# Patient Record
Sex: MALE | Race: OTHER | Employment: OTHER | URBAN - METROPOLITAN AREA
[De-identification: names, ages, dates, MRNs, and addresses within clinical notes are randomized per-mention and may not be internally consistent; named-entity substitution may affect disease eponyms.]

---

## 2017-01-09 RX ORDER — ENALAPRIL MALEATE 20 MG/1
TABLET ORAL
Qty: 30 TABLET | Refills: 5 | OUTPATIENT
Start: 2017-01-09

## 2017-01-09 RX ORDER — ATENOLOL 100 MG/1
TABLET ORAL
Qty: 30 TABLET | Refills: 2 | Status: SHIPPED | OUTPATIENT
Start: 2017-01-09 | End: 2017-02-20

## 2017-01-09 RX ORDER — PROPYLTHIOURACIL 50 MG/1
TABLET ORAL
Qty: 180 TABLET | Refills: 5 | OUTPATIENT
Start: 2017-01-09

## 2017-01-09 NOTE — TELEPHONE ENCOUNTER
Per med module, enalapril and propylthiouracil approved on 10/6/16 for 6 months. Refill request too soon.  Therefore duplicate request denied at this time    Hypertensive Medications  Protocol Criteria:  · Appointment scheduled in the past 6 months or in th

## 2017-01-10 RX ORDER — FUROSEMIDE 40 MG/1
TABLET ORAL
Qty: 30 TABLET | Refills: 2 | Status: SHIPPED | OUTPATIENT
Start: 2017-01-10 | End: 2017-10-16 | Stop reason: DRUGHIGH

## 2017-01-31 ENCOUNTER — TELEPHONE (OUTPATIENT)
Dept: FAMILY MEDICINE CLINIC | Facility: CLINIC | Age: 77
End: 2017-01-31

## 2017-01-31 NOTE — TELEPHONE ENCOUNTER
Patients son in law - Jeff Horton called requesting Prior Author - insurance will not cover - Xarelto, will cover the doctor says what reason. Insurance - Medicare ph# [id number]. No ID or refers number given.

## 2017-02-14 RX ORDER — PROPYLTHIOURACIL 50 MG/1
TABLET ORAL
Qty: 180 TABLET | Refills: 0 | Status: SHIPPED | OUTPATIENT
Start: 2017-02-14 | End: 2017-03-18

## 2017-02-14 RX ORDER — ENALAPRIL MALEATE 20 MG/1
TABLET ORAL
Qty: 30 TABLET | Refills: 0 | Status: SHIPPED | OUTPATIENT
Start: 2017-02-14 | End: 2017-03-18

## 2017-02-14 RX ORDER — ATENOLOL 100 MG/1
TABLET ORAL
Qty: 30 TABLET | Refills: 0 | Status: SHIPPED | OUTPATIENT
Start: 2017-02-14 | End: 2017-03-18

## 2017-02-20 ENCOUNTER — LAB ENCOUNTER (OUTPATIENT)
Dept: LAB | Age: 77
End: 2017-02-20
Attending: FAMILY MEDICINE
Payer: MEDICARE

## 2017-02-20 ENCOUNTER — OFFICE VISIT (OUTPATIENT)
Dept: FAMILY MEDICINE CLINIC | Facility: CLINIC | Age: 77
End: 2017-02-20

## 2017-02-20 VITALS
WEIGHT: 216 LBS | DIASTOLIC BLOOD PRESSURE: 65 MMHG | SYSTOLIC BLOOD PRESSURE: 120 MMHG | TEMPERATURE: 98 F | BODY MASS INDEX: 33 KG/M2

## 2017-02-20 DIAGNOSIS — E05.90 HYPERTHYROIDISM: ICD-10-CM

## 2017-02-20 DIAGNOSIS — I48.20 CHRONIC ATRIAL FIBRILLATION (HCC): ICD-10-CM

## 2017-02-20 DIAGNOSIS — I10 ESSENTIAL HYPERTENSION: ICD-10-CM

## 2017-02-20 DIAGNOSIS — I10 ESSENTIAL HYPERTENSION: Primary | ICD-10-CM

## 2017-02-20 LAB
ANION GAP SERPL CALC-SCNC: 6 MMOL/L (ref 0–18)
BASOPHILS # BLD: 0 K/UL (ref 0–0.2)
BASOPHILS NFR BLD: 0 %
BUN SERPL-MCNC: 21 MG/DL (ref 8–20)
BUN/CREAT SERPL: 23.6 (ref 10–20)
CALCIUM SERPL-MCNC: 9.2 MG/DL (ref 8.5–10.5)
CHLORIDE SERPL-SCNC: 107 MMOL/L (ref 95–110)
CHOLEST SERPL-MCNC: 218 MG/DL (ref 110–200)
CO2 SERPL-SCNC: 30 MMOL/L (ref 22–32)
CREAT SERPL-MCNC: 0.89 MG/DL (ref 0.5–1.5)
EOSINOPHIL # BLD: 0.1 K/UL (ref 0–0.7)
EOSINOPHIL NFR BLD: 1 %
ERYTHROCYTE [DISTWIDTH] IN BLOOD BY AUTOMATED COUNT: 14.2 % (ref 11–15)
GLUCOSE SERPL-MCNC: 100 MG/DL (ref 70–99)
HCT VFR BLD AUTO: 44.4 % (ref 41–52)
HDLC SERPL-MCNC: 45 MG/DL
HGB BLD-MCNC: 14.9 G/DL (ref 13.5–17.5)
LDLC SERPL CALC-MCNC: 162 MG/DL (ref 0–99)
LYMPHOCYTES # BLD: 1.6 K/UL (ref 1–4)
LYMPHOCYTES NFR BLD: 31 %
MCH RBC QN AUTO: 29.3 PG (ref 27–32)
MCHC RBC AUTO-ENTMCNC: 33.7 G/DL (ref 32–37)
MCV RBC AUTO: 86.9 FL (ref 80–100)
MONOCYTES # BLD: 0.5 K/UL (ref 0–1)
MONOCYTES NFR BLD: 9 %
NEUTROPHILS # BLD AUTO: 2.9 K/UL (ref 1.8–7.7)
NEUTROPHILS NFR BLD: 58 %
NONHDLC SERPL-MCNC: 173 MG/DL
OSMOLALITY UR CALC.SUM OF ELEC: 299 MOSM/KG (ref 275–295)
PLATELET # BLD AUTO: 117 K/UL (ref 140–400)
PMV BLD AUTO: 9.8 FL (ref 7.4–10.3)
POTASSIUM SERPL-SCNC: 4 MMOL/L (ref 3.3–5.1)
RBC # BLD AUTO: 5.1 M/UL (ref 4.5–5.9)
SODIUM SERPL-SCNC: 143 MMOL/L (ref 136–144)
T4 FREE SERPL-MCNC: 0.76 NG/DL (ref 0.58–1.64)
TRIGL SERPL-MCNC: 53 MG/DL (ref 1–149)
WBC # BLD AUTO: 5.1 K/UL (ref 4–11)

## 2017-02-20 PROCEDURE — 84439 ASSAY OF FREE THYROXINE: CPT

## 2017-02-20 PROCEDURE — 85025 COMPLETE CBC W/AUTO DIFF WBC: CPT

## 2017-02-20 PROCEDURE — 36415 COLL VENOUS BLD VENIPUNCTURE: CPT

## 2017-02-20 PROCEDURE — 99214 OFFICE O/P EST MOD 30 MIN: CPT | Performed by: FAMILY MEDICINE

## 2017-02-20 PROCEDURE — 80061 LIPID PANEL: CPT

## 2017-02-20 PROCEDURE — G0463 HOSPITAL OUTPT CLINIC VISIT: HCPCS | Performed by: FAMILY MEDICINE

## 2017-02-20 PROCEDURE — 80048 BASIC METABOLIC PNL TOTAL CA: CPT

## 2017-02-20 NOTE — PROGRESS NOTES
HPI:    Patient ID: Chinyere Moses is a 67year old male. HPI  Patient presents with:  Hypertension: f/u medication  Thyroid Problem: f/u medication  Here with daughter ,  Taking medication regularly. Is planning cardiology follow up.    Review of System

## 2017-02-21 ENCOUNTER — TELEPHONE (OUTPATIENT)
Dept: CARDIOLOGY CLINIC | Facility: CLINIC | Age: 77
End: 2017-02-21

## 2017-02-21 NOTE — TELEPHONE ENCOUNTER
S/w Tasi and states pt had episode last night of heart rates being low and Bp was low. Bp last night was 110/66 with pulse of 44. Pt was sleeping and felt a left- sided pressure. Today pt is doing well his blood pressure is 120/70 and pulse of 56.  This is

## 2017-02-27 ENCOUNTER — OFFICE VISIT (OUTPATIENT)
Dept: CARDIOLOGY CLINIC | Facility: CLINIC | Age: 77
End: 2017-02-27

## 2017-02-27 VITALS
BODY MASS INDEX: 32.43 KG/M2 | DIASTOLIC BLOOD PRESSURE: 78 MMHG | HEIGHT: 68 IN | WEIGHT: 214 LBS | HEART RATE: 52 BPM | SYSTOLIC BLOOD PRESSURE: 110 MMHG | RESPIRATION RATE: 18 BRPM

## 2017-02-27 DIAGNOSIS — I48.19 PERSISTENT ATRIAL FIBRILLATION (HCC): Primary | ICD-10-CM

## 2017-02-27 PROCEDURE — 93005 ELECTROCARDIOGRAM TRACING: CPT | Performed by: NURSE PRACTITIONER

## 2017-02-27 PROCEDURE — 99214 OFFICE O/P EST MOD 30 MIN: CPT | Performed by: NURSE PRACTITIONER

## 2017-02-27 PROCEDURE — G0463 HOSPITAL OUTPT CLINIC VISIT: HCPCS | Performed by: NURSE PRACTITIONER

## 2017-02-27 PROCEDURE — 93010 ELECTROCARDIOGRAM REPORT: CPT | Performed by: NURSE PRACTITIONER

## 2017-02-27 NOTE — PATIENT INSTRUCTIONS
1. Cut atenolol to 50mg daily at night  2.  Check BP and pulse 2-3 times a week and bring in reading to appt this month

## 2017-02-27 NOTE — PROGRESS NOTES
Anamaria Pepe is a 67year old male. Patient presents with:  Atrial Fibrillation  Edema: lower extremity edema, episodes of chest pressure when patient reports low hr & low bp  Dyspnea: on exertion    HPI:   Patient comes in today for a checkup for his atri upper and lower lids of both eyes 12/22/2015   • Atrial fibrillation (HCC)       Social History:    Smoking Status: Never Smoker                      Alcohol Use: No                 REVIEW OF SYSTEMS:   GENERAL HEALTH: feels well otherwise  SKIN: denies an

## 2017-03-03 ENCOUNTER — TELEPHONE (OUTPATIENT)
Dept: FAMILY MEDICINE CLINIC | Facility: CLINIC | Age: 77
End: 2017-03-03

## 2017-03-03 RX ORDER — ATORVASTATIN CALCIUM 10 MG/1
10 TABLET, FILM COATED ORAL NIGHTLY
Qty: 30 TABLET | Refills: 2 | Status: SHIPPED | OUTPATIENT
Start: 2017-03-03 | End: 2017-06-05

## 2017-03-03 NOTE — TELEPHONE ENCOUNTER
----- Message from Liss Rosado, DO sent at 3/3/2017  1:45 PM CST -----  Stable labs. I do need to add cholesterol lowering medication. See back in 3 months to recheck cholesterol while on med.  Please discuss with pts daughter he does not speak Aric Griffin

## 2017-03-03 NOTE — TELEPHONE ENCOUNTER
Pt gave verbal permission over the phone to speak with son in law Malou Bowling regarding his medical information. Tasi informed of 2/20/17 lab results along with with Dr Perez Exon recommendations. He verbalized understanding and will explain to pt.

## 2017-03-20 RX ORDER — ENALAPRIL MALEATE 20 MG/1
TABLET ORAL
Qty: 30 TABLET | Refills: 5 | Status: SHIPPED | OUTPATIENT
Start: 2017-03-20 | End: 2017-09-19

## 2017-03-20 RX ORDER — ATENOLOL 100 MG/1
TABLET ORAL
Qty: 30 TABLET | Refills: 5 | Status: SHIPPED | OUTPATIENT
Start: 2017-03-20 | End: 2017-09-19

## 2017-03-20 RX ORDER — PROPYLTHIOURACIL 50 MG/1
TABLET ORAL
Qty: 180 TABLET | Refills: 5 | Status: SHIPPED | OUTPATIENT
Start: 2017-03-20 | End: 2017-09-19

## 2017-04-24 RX ORDER — ENALAPRIL MALEATE 20 MG/1
TABLET ORAL
Qty: 30 TABLET | Refills: 0 | OUTPATIENT
Start: 2017-04-24

## 2017-04-24 RX ORDER — ATENOLOL 100 MG/1
TABLET ORAL
Qty: 30 TABLET | Refills: 0 | OUTPATIENT
Start: 2017-04-24

## 2017-04-24 RX ORDER — PROPYLTHIOURACIL 50 MG/1
TABLET ORAL
Qty: 180 TABLET | Refills: 0 | OUTPATIENT
Start: 2017-04-24

## 2017-06-06 RX ORDER — ATORVASTATIN CALCIUM 10 MG/1
TABLET, FILM COATED ORAL
Qty: 30 TABLET | Refills: 0 | Status: SHIPPED | OUTPATIENT
Start: 2017-06-06 | End: 2017-07-04

## 2017-06-06 RX ORDER — FUROSEMIDE 20 MG/1
TABLET ORAL
Qty: 60 TABLET | Refills: 0 | Status: SHIPPED | OUTPATIENT
Start: 2017-06-06 | End: 2017-07-04

## 2017-06-07 ENCOUNTER — MOBILE ENCOUNTER (OUTPATIENT)
Dept: NEPHROLOGY | Facility: CLINIC | Age: 77
End: 2017-06-07

## 2017-07-07 RX ORDER — FUROSEMIDE 20 MG/1
TABLET ORAL
Qty: 60 TABLET | Refills: 1 | Status: SHIPPED | OUTPATIENT
Start: 2017-07-07 | End: 2017-09-03

## 2017-07-07 RX ORDER — ATORVASTATIN CALCIUM 10 MG/1
TABLET, FILM COATED ORAL
Qty: 30 TABLET | Refills: 6 | Status: SHIPPED | OUTPATIENT
Start: 2017-07-07 | End: 2017-10-16

## 2017-09-06 NOTE — TELEPHONE ENCOUNTER
Hypertensive Medications  Protocol Criteria:  · Appointment scheduled in the past 6 months or in the next 3 months  · BMP or CMP in the past 12 months  · Creatinine result < 2  Recent Outpatient Visits            5 months ago     [address]

## 2017-09-06 NOTE — TELEPHONE ENCOUNTER
DR PATIÑO: please review and confirm how patient should be taking his furosemide.  I see two different directions in his med list.

## 2017-09-07 RX ORDER — FUROSEMIDE 20 MG/1
TABLET ORAL
Qty: 60 TABLET | Refills: 5 | Status: SHIPPED | OUTPATIENT
Start: 2017-09-07 | End: 2017-10-16

## 2017-09-22 NOTE — TELEPHONE ENCOUNTER
Hypertensive Medications. PROTOCOL FAILED. PLEASE ADVISE ON REFILL. THANKS.     Protocol Criteria:  · Appointment scheduled in the past 6 months or in the next 3 months  · BMP or CMP in the past 12 months  · Creatinine result < 2  Recent Outpatient Visits months ago Age-related nuclear cataract of left eye    [facility] Mulugeta Reyez MD    Office Visit    12 months ago Chronic atrial fibrillation [facility])    [facility] Cardiology Amy Skinner MD    [facility]

## 2017-09-26 RX ORDER — PROPYLTHIOURACIL 50 MG/1
TABLET ORAL
Qty: 180 TABLET | Refills: 0 | Status: SHIPPED | OUTPATIENT
Start: 2017-09-26 | End: 2017-10-16

## 2017-09-26 RX ORDER — ATENOLOL 100 MG/1
TABLET ORAL
Qty: 30 TABLET | Refills: 0 | Status: SHIPPED | OUTPATIENT
Start: 2017-09-26 | End: 2017-10-16

## 2017-09-26 RX ORDER — ENALAPRIL MALEATE 20 MG/1
TABLET ORAL
Qty: 30 TABLET | Refills: 0 | Status: SHIPPED | OUTPATIENT
Start: 2017-09-26 | End: 2017-10-16

## 2017-09-26 NOTE — TELEPHONE ENCOUNTER
Called home #. Son in law on hipaa. Informed patient will need f/u appt with DR PATIÑO. Phone then disconnected. CSS, if he calls back, ok to give f/u appt for his medication management. One month meds were sent to [facility].

## 2017-10-16 ENCOUNTER — OFFICE VISIT (OUTPATIENT)
Dept: FAMILY MEDICINE CLINIC | Facility: CLINIC | Age: 77
End: 2017-10-16

## 2017-10-16 VITALS
HEART RATE: 60 BPM | DIASTOLIC BLOOD PRESSURE: 67 MMHG | SYSTOLIC BLOOD PRESSURE: 152 MMHG | WEIGHT: 197 LBS | BODY MASS INDEX: 30 KG/M2

## 2017-10-16 DIAGNOSIS — I10 ESSENTIAL HYPERTENSION: Primary | ICD-10-CM

## 2017-10-16 DIAGNOSIS — I48.20 CHRONIC ATRIAL FIBRILLATION (HCC): ICD-10-CM

## 2017-10-16 DIAGNOSIS — E05.90 HYPERTHYROIDISM: ICD-10-CM

## 2017-10-16 PROCEDURE — G0463 HOSPITAL OUTPT CLINIC VISIT: HCPCS | Performed by: FAMILY MEDICINE

## 2017-10-16 PROCEDURE — 99214 OFFICE O/P EST MOD 30 MIN: CPT | Performed by: FAMILY MEDICINE

## 2017-10-16 RX ORDER — ATORVASTATIN CALCIUM 10 MG/1
TABLET, FILM COATED ORAL
Qty: 30 TABLET | Refills: 5 | Status: SHIPPED | OUTPATIENT
Start: 2017-10-16 | End: 2018-07-14

## 2017-10-16 RX ORDER — PROPYLTHIOURACIL 50 MG/1
TABLET ORAL
Qty: 180 TABLET | Refills: 5 | Status: SHIPPED | OUTPATIENT
Start: 2017-10-16 | End: 2018-04-07

## 2017-10-16 RX ORDER — ENALAPRIL MALEATE 20 MG/1
20 TABLET ORAL
Qty: 30 TABLET | Refills: 5 | Status: SHIPPED | OUTPATIENT
Start: 2017-10-16 | End: 2018-04-07

## 2017-10-16 RX ORDER — ATENOLOL 100 MG/1
100 TABLET ORAL
Qty: 30 TABLET | Refills: 5 | Status: SHIPPED | OUTPATIENT
Start: 2017-10-16 | End: 2018-07-14

## 2017-10-16 RX ORDER — FUROSEMIDE 20 MG/1
TABLET ORAL
Qty: 60 TABLET | Refills: 5 | Status: SHIPPED | OUTPATIENT
Start: 2017-10-16 | End: 2018-04-07

## 2017-10-16 NOTE — PROGRESS NOTES
HPI:    Patient ID: Antonio Zimmer is a 65year old male. HPI  Patient presents with:  Hypertension: f/u medication  Thyroid Problem: f/u hypothyroidism    Review of Systems   Constitutional: Negative. Respiratory: Negative.     Cardiovascular: Negativ propylthiouracil 50 MG Oral Tab 180 tablet 5      Sig: TAKE 2 TABLETS BY MOUTH THREE TIMES A DAY      furosemide 20 MG Oral Tab 60 tablet 5      Sig: TAKE TWO TABLETS BY MOUTH DAILY      Enalapril Maleate 20 MG Oral Tab 30 tablet 5      Sig: Take 1 tablet

## 2017-10-19 ENCOUNTER — APPOINTMENT (OUTPATIENT)
Dept: LAB | Age: 77
End: 2017-10-19
Attending: FAMILY MEDICINE
Payer: MEDICARE

## 2017-10-19 DIAGNOSIS — E05.90 HYPERTHYROIDISM: ICD-10-CM

## 2017-10-19 DIAGNOSIS — I10 ESSENTIAL HYPERTENSION: ICD-10-CM

## 2017-10-19 PROCEDURE — 84439 ASSAY OF FREE THYROXINE: CPT

## 2017-10-19 PROCEDURE — 36415 COLL VENOUS BLD VENIPUNCTURE: CPT

## 2017-10-19 PROCEDURE — 80048 BASIC METABOLIC PNL TOTAL CA: CPT

## 2017-10-19 PROCEDURE — 84443 ASSAY THYROID STIM HORMONE: CPT

## 2017-10-24 ENCOUNTER — TELEPHONE (OUTPATIENT)
Dept: OTHER | Age: 77
End: 2017-10-24

## 2017-10-24 DIAGNOSIS — E03.9 HYPOTHYROIDISM, UNSPECIFIED TYPE: Primary | ICD-10-CM

## 2017-10-24 NOTE — TELEPHONE ENCOUNTER
LMTCB transfer to triage    Notes Recorded by Dale Palm DO on 10/23/2017 at 9:11 AM CDT  Continue to take 2 thyroid pills twice per day (Rx is written two tabs three times daily). T4 normal range.  Need thyroid recheck in 6 months.

## 2017-10-25 NOTE — TELEPHONE ENCOUNTER
Pt with son and son informed of Dr Castro Oar orders from 10/23/17 below and repeated information twice. He verbalized understanding. Pt doesn't speak english. Son informed to complete PHI form next time in office.

## 2017-10-30 ENCOUNTER — TELEPHONE (OUTPATIENT)
Dept: CARDIOLOGY CLINIC | Facility: CLINIC | Age: 77
End: 2017-10-30

## 2017-10-30 DIAGNOSIS — I48.20 CHRONIC ATRIAL FIBRILLATION (HCC): ICD-10-CM

## 2017-10-30 NOTE — TELEPHONE ENCOUNTER
Melvinai requesting refills for pt - Xarelto. Pls call. Thank you. Current Outpatient Prescriptions:  Rivaroxaban (XARELTO) 20 MG Oral Tab Take 1 tablet (20 mg total) by mouth daily with food.  (Patient taking differently: Take 10 mg by mouth daily with

## 2017-12-04 ENCOUNTER — OFFICE VISIT (OUTPATIENT)
Dept: CARDIOLOGY CLINIC | Facility: CLINIC | Age: 77
End: 2017-12-04

## 2017-12-04 VITALS
RESPIRATION RATE: 20 BRPM | SYSTOLIC BLOOD PRESSURE: 120 MMHG | BODY MASS INDEX: 30 KG/M2 | HEART RATE: 65 BPM | WEIGHT: 199 LBS | DIASTOLIC BLOOD PRESSURE: 70 MMHG

## 2017-12-04 DIAGNOSIS — I48.20 CHRONIC ATRIAL FIBRILLATION (HCC): Primary | ICD-10-CM

## 2017-12-04 DIAGNOSIS — I70.221 ATHEROSCLEROSIS OF NATIVE ARTERY OF RIGHT LOWER EXTREMITY WITH REST PAIN (HCC): ICD-10-CM

## 2017-12-04 DIAGNOSIS — M79.604 PAIN OF RIGHT LOWER EXTREMITY: ICD-10-CM

## 2017-12-04 DIAGNOSIS — R60.0 LOCALIZED EDEMA: ICD-10-CM

## 2017-12-04 DIAGNOSIS — IMO0002 ULCERATION: ICD-10-CM

## 2017-12-04 PROBLEM — R60.9 EDEMA: Status: ACTIVE | Noted: 2017-12-04

## 2017-12-04 PROBLEM — M79.606 LEG PAIN: Status: ACTIVE | Noted: 2017-12-04

## 2017-12-04 PROCEDURE — G0463 HOSPITAL OUTPT CLINIC VISIT: HCPCS | Performed by: INTERNAL MEDICINE

## 2017-12-04 PROCEDURE — 99214 OFFICE O/P EST MOD 30 MIN: CPT | Performed by: INTERNAL MEDICINE

## 2017-12-04 NOTE — PATIENT INSTRUCTIONS
Please decrease atenolol to 50 mg or one half of the current tablets per day. Please get ultrasounds of the arteries and veins and the legs.

## 2017-12-04 NOTE — PROGRESS NOTES
Deshawn Kekaha is a 65year old male. Patient presents with: Follow - Up: BP and pulse low, Skin peeling on right foot    HPI:   Patient has chronic atrial fibrillation on rate control and anticoagulation.   According to his daughter times his blood pressure heartburn  NEURO: denies headaches    EXAM:   /70   Pulse 65   Resp 20   Wt 199 lb (90.3 kg)   BMI 30.26 kg/m²   GENERAL: well developed, well nourished,in no apparent distress  SKIN: no rashes,no suspicious lesions  HEENT: atraumatic, normocephalic,

## 2017-12-13 ENCOUNTER — HOSPITAL ENCOUNTER (OUTPATIENT)
Dept: ULTRASOUND IMAGING | Facility: HOSPITAL | Age: 77
Discharge: HOME | End: 2017-12-13
Attending: INTERNAL MEDICINE
Payer: MEDICARE

## 2017-12-13 DIAGNOSIS — R60.0 LOCALIZED EDEMA: ICD-10-CM

## 2017-12-13 PROCEDURE — 93970 EXTREMITY STUDY: CPT | Performed by: INTERNAL MEDICINE

## 2017-12-22 ENCOUNTER — HOSPITAL ENCOUNTER (OUTPATIENT)
Dept: ULTRASOUND IMAGING | Facility: HOSPITAL | Age: 77
Discharge: HOME | End: 2017-12-22
Attending: INTERNAL MEDICINE
Payer: MEDICARE

## 2017-12-22 DIAGNOSIS — I70.221 ATHEROSCLEROSIS OF NATIVE ARTERY OF RIGHT LOWER EXTREMITY WITH REST PAIN (HCC): ICD-10-CM

## 2017-12-22 DIAGNOSIS — IMO0002 ULCERATION: ICD-10-CM

## 2017-12-22 PROCEDURE — 93923 UPR/LXTR ART STDY 3+ LVLS: CPT | Performed by: INTERNAL MEDICINE

## 2018-01-02 ENCOUNTER — OFFICE VISIT (OUTPATIENT)
Dept: CARDIOLOGY CLINIC | Facility: CLINIC | Age: 78
End: 2018-01-02

## 2018-01-02 VITALS
BODY MASS INDEX: 31 KG/M2 | RESPIRATION RATE: 16 BRPM | HEART RATE: 60 BPM | WEIGHT: 203 LBS | DIASTOLIC BLOOD PRESSURE: 60 MMHG | SYSTOLIC BLOOD PRESSURE: 132 MMHG

## 2018-01-02 DIAGNOSIS — E78.5 HYPERLIPIDEMIA, UNSPECIFIED HYPERLIPIDEMIA TYPE: Primary | ICD-10-CM

## 2018-01-02 PROCEDURE — 99213 OFFICE O/P EST LOW 20 MIN: CPT | Performed by: NURSE PRACTITIONER

## 2018-01-02 PROCEDURE — G0463 HOSPITAL OUTPT CLINIC VISIT: HCPCS | Performed by: NURSE PRACTITIONER

## 2018-01-02 NOTE — PROGRESS NOTES
Florentino Madrid is a 65year old male. No chief complaint on file. HPI:   Patient comes in today for a checkup for atrial fibrillation.   He sees Dr. Katia Willard he was last seen couple weeks ago at that time there was some concern about his heart rate is getting skin lesions or rashes  RESPIRATORY: denies shortness of breath with exertion  CARDIOVASCULAR: no chest pain  GI: denies abdominal pain and denies heartburn  NEURO: denies headaches    EXAM:   /60   Pulse 60   Resp 16   Wt 203 lb (92.1 kg)   BMI 30.8

## 2018-01-26 ENCOUNTER — TELEPHONE (OUTPATIENT)
Dept: CARDIOLOGY CLINIC | Facility: CLINIC | Age: 78
End: 2018-01-26

## 2018-01-26 DIAGNOSIS — I48.20 CHRONIC ATRIAL FIBRILLATION (HCC): ICD-10-CM

## 2018-01-26 NOTE — TELEPHONE ENCOUNTER
Xarelto 20mg tabs, take 1 tab (20mg) by mouth daily w/ food, qty 30    Current Outpatient Prescriptions:   •  Rivaroxaban (XARELTO) 20 MG Oral Tab, Take 1 tablet (20 mg total) by mouth daily with food. , Disp: 30 tablet, Rfl: 3

## 2018-04-09 RX ORDER — FUROSEMIDE 20 MG/1
TABLET ORAL
Qty: 60 TABLET | Refills: 2 | Status: SHIPPED | OUTPATIENT
Start: 2018-04-09 | End: 2018-07-14

## 2018-04-09 RX ORDER — PROPYLTHIOURACIL 50 MG/1
TABLET ORAL
Qty: 180 TABLET | Refills: 0 | Status: SHIPPED | OUTPATIENT
Start: 2018-04-09 | End: 2018-05-13

## 2018-04-09 RX ORDER — ENALAPRIL MALEATE 20 MG/1
TABLET ORAL
Qty: 30 TABLET | Refills: 2 | Status: SHIPPED | OUTPATIENT
Start: 2018-04-09 | End: 2018-07-14

## 2018-04-09 NOTE — TELEPHONE ENCOUNTER
Hypertensive Medications  Protocol Criteria:  · Appointment scheduled in the past 6 months or in the next 3 months  · BMP or CMP in the past 12 months  · Creatinine result < 2  Recent Outpatient Visits            3 months ago Hyperlipidemia, unspecified hy [facility] Cardiology Melody Grove MD    Office Visit    1 year ago Persistent atrial fibrillation [facility])    [facility] Cardiology CORBIN Campuzano    Office Visit            Lab Results  Component Value Date   TSH <0.01 (L) 10/19/201

## 2018-04-23 ENCOUNTER — OFFICE VISIT (OUTPATIENT)
Dept: FAMILY MEDICINE CLINIC | Facility: CLINIC | Age: 78
End: 2018-04-23

## 2018-04-23 VITALS
HEART RATE: 65 BPM | BODY MASS INDEX: 31 KG/M2 | SYSTOLIC BLOOD PRESSURE: 130 MMHG | DIASTOLIC BLOOD PRESSURE: 71 MMHG | WEIGHT: 207 LBS

## 2018-04-23 DIAGNOSIS — H53.9 VISION CHANGES: ICD-10-CM

## 2018-04-23 DIAGNOSIS — I48.20 CHRONIC ATRIAL FIBRILLATION (HCC): ICD-10-CM

## 2018-04-23 DIAGNOSIS — I73.9 PVD (PERIPHERAL VASCULAR DISEASE) (HCC): ICD-10-CM

## 2018-04-23 DIAGNOSIS — E05.90 HYPERTHYROIDISM: Primary | ICD-10-CM

## 2018-04-23 PROCEDURE — G0463 HOSPITAL OUTPT CLINIC VISIT: HCPCS | Performed by: FAMILY MEDICINE

## 2018-04-23 PROCEDURE — 99214 OFFICE O/P EST MOD 30 MIN: CPT | Performed by: FAMILY MEDICINE

## 2018-04-23 RX ORDER — FLUTICASONE PROPIONATE 0.05 %
CREAM (GRAM) TOPICAL
Qty: 45 G | Refills: 1 | Status: SHIPPED | OUTPATIENT
Start: 2018-04-23

## 2018-04-23 NOTE — PROGRESS NOTES
HPI:    Patient ID: Chelo Cruz is a 65year old male. HPI  Patient presents with:  Hypertension: 6 month f/u medication and refills  Hyperthyroidism  Atrial Fibrillation    Review of Systems   Constitutional: Negative. Respiratory: Negative.     Ca Refills    Fluticasone Propionate 0.05 % External Cream 45 g 1      Sig: Apply one to two times daily as needed for itching           Imaging & Referrals:  OPHTHALMOLOGY - INTERNAL       IS#[id number]

## 2018-04-30 ENCOUNTER — APPOINTMENT (OUTPATIENT)
Dept: LAB | Age: 78
End: 2018-04-30
Attending: FAMILY MEDICINE
Payer: MEDICARE

## 2018-04-30 DIAGNOSIS — E78.5 HYPERLIPIDEMIA, UNSPECIFIED HYPERLIPIDEMIA TYPE: ICD-10-CM

## 2018-04-30 DIAGNOSIS — E05.90 HYPERTHYROIDISM: ICD-10-CM

## 2018-04-30 DIAGNOSIS — E03.9 HYPOTHYROIDISM, UNSPECIFIED TYPE: ICD-10-CM

## 2018-04-30 PROCEDURE — 80061 LIPID PANEL: CPT

## 2018-04-30 PROCEDURE — 84443 ASSAY THYROID STIM HORMONE: CPT

## 2018-04-30 PROCEDURE — 84450 TRANSFERASE (AST) (SGOT): CPT

## 2018-04-30 PROCEDURE — 36415 COLL VENOUS BLD VENIPUNCTURE: CPT

## 2018-04-30 PROCEDURE — 84460 ALANINE AMINO (ALT) (SGPT): CPT

## 2018-04-30 PROCEDURE — 84439 ASSAY OF FREE THYROXINE: CPT

## 2018-05-04 ENCOUNTER — TELEPHONE (OUTPATIENT)
Dept: CARDIOLOGY CLINIC | Facility: CLINIC | Age: 78
End: 2018-05-04

## 2018-05-04 DIAGNOSIS — E78.5 HYPERLIPIDEMIA, UNSPECIFIED HYPERLIPIDEMIA TYPE: Primary | ICD-10-CM

## 2018-05-04 RX ORDER — ATORVASTATIN CALCIUM 20 MG/1
20 TABLET, FILM COATED ORAL NIGHTLY
Qty: 90 TABLET | Refills: 0 | Status: SHIPPED | OUTPATIENT
Start: 2018-05-04 | End: 2018-10-01

## 2018-05-04 NOTE — TELEPHONE ENCOUNTER
----- Message from Mikal Eisenmenger, APN sent at 5/4/2018  4:15 PM CDT -----  Lipids are improved but would like to get LDL <130 increase atrovastatin to 20mg and recheck in 2 months

## 2018-05-14 NOTE — TELEPHONE ENCOUNTER
Pending Prescriptions Disp Refills    PROPYLTHIOURACIL 50 MG Oral Tab [Pharmacy Med Name: Propylthiouracil Oral Tablet 50 MG] 180 tablet 0     Sig: TAKE TWO TABLETS BY MOUTH THREE TIMES DAILY            Hypothyroid Medications  Protocol Criteria:  Appointment scheduled in the past 12 months or the next 3 months  TSH resulted in the past 12 months that is normal  Recent Outpatient Visits            3 weeks ago Hyperthyroidism    [facility], [address] [place] [address]    Office Visit    4 months ago Hyperlipidemia, unspecified hyperlipidemia type    [facility] Cardiology CORBIN Rivera    Office Visit    5 months ago Chronic atrial fibrillation [facility])    [facility] Cardiology Surjit Mendez MD    Office Visit    7 months ago Essential hypertension    [facility] [address] [place] [address]    Office Visit    1 year ago     [facility] Cardiology Surjit Mendez MD    Office Visit            Lab Results  Component Value Date   TSH <0.01 (L) 04/30/2018   THYROIDFUNC 0.09 (L) 03/24/2016

## 2018-05-19 RX ORDER — PROPYLTHIOURACIL 50 MG/1
TABLET ORAL
Qty: 180 TABLET | Refills: 5 | Status: SHIPPED | OUTPATIENT
Start: 2018-05-19 | End: 2018-11-27

## 2018-06-19 ENCOUNTER — TELEPHONE (OUTPATIENT)
Dept: CARDIOLOGY CLINIC | Facility: CLINIC | Age: 78
End: 2018-06-19

## 2018-06-19 DIAGNOSIS — I48.20 CHRONIC ATRIAL FIBRILLATION (HCC): ICD-10-CM

## 2018-06-21 NOTE — TELEPHONE ENCOUNTER
Re: Guillaume Rodas referral.  Meets & filled per protocol. Called patient LMTCB to schedule follow up with Dr. Anitha Coleman (was due April or May per LOV). Please send call back to RN [phone] for pos 7/9 slot. Thanks.       Anticoagulant Medications  Protocol Criteria:  ·

## 2018-07-16 RX ORDER — ATENOLOL 100 MG/1
TABLET ORAL
Qty: 90 TABLET | Refills: 0 | Status: SHIPPED | OUTPATIENT
Start: 2018-07-16 | End: 2020-06-02

## 2018-07-16 RX ORDER — ENALAPRIL MALEATE 20 MG/1
TABLET ORAL
Qty: 90 TABLET | Refills: 0 | Status: SHIPPED | OUTPATIENT
Start: 2018-07-16 | End: 2020-06-02

## 2018-07-16 RX ORDER — FUROSEMIDE 20 MG/1
TABLET ORAL
Qty: 180 TABLET | Refills: 0 | Status: SHIPPED | OUTPATIENT
Start: 2018-07-16 | End: 2018-12-11

## 2018-07-20 RX ORDER — ATORVASTATIN CALCIUM 10 MG/1
TABLET, FILM COATED ORAL
Qty: 90 TABLET | Refills: 1 | Status: SHIPPED | OUTPATIENT
Start: 2018-07-20 | End: 2019-01-17

## 2018-08-17 ENCOUNTER — APPOINTMENT (OUTPATIENT)
Dept: LAB | Age: 78
End: 2018-08-17
Attending: INTERNAL MEDICINE
Payer: MEDICARE

## 2018-08-17 DIAGNOSIS — E78.5 HYPERLIPIDEMIA, UNSPECIFIED HYPERLIPIDEMIA TYPE: ICD-10-CM

## 2018-08-17 LAB
ALT SERPL-CCNC: 21 U/L (ref 17–63)
AST SERPL-CCNC: 20 U/L (ref 15–41)
CHOLEST SERPL-MCNC: 143 MG/DL (ref 110–200)
HDLC SERPL-MCNC: 42 MG/DL
LDLC SERPL CALC-MCNC: 90 MG/DL (ref 0–99)
NONHDLC SERPL-MCNC: 101 MG/DL
TRIGL SERPL-MCNC: 53 MG/DL (ref 1–149)

## 2018-08-17 PROCEDURE — 80061 LIPID PANEL: CPT

## 2018-08-17 PROCEDURE — 84460 ALANINE AMINO (ALT) (SGPT): CPT

## 2018-08-17 PROCEDURE — 84450 TRANSFERASE (AST) (SGOT): CPT

## 2018-08-17 PROCEDURE — 36415 COLL VENOUS BLD VENIPUNCTURE: CPT

## 2018-10-01 ENCOUNTER — OFFICE VISIT (OUTPATIENT)
Dept: CARDIOLOGY CLINIC | Facility: CLINIC | Age: 78
End: 2018-10-01
Payer: MEDICARE

## 2018-10-01 VITALS
DIASTOLIC BLOOD PRESSURE: 61 MMHG | SYSTOLIC BLOOD PRESSURE: 119 MMHG | HEIGHT: 69.5 IN | WEIGHT: 209 LBS | RESPIRATION RATE: 18 BRPM | HEART RATE: 63 BPM | BODY MASS INDEX: 30.26 KG/M2

## 2018-10-01 DIAGNOSIS — I48.20 CHRONIC ATRIAL FIBRILLATION (HCC): Primary | ICD-10-CM

## 2018-10-01 PROCEDURE — G0463 HOSPITAL OUTPT CLINIC VISIT: HCPCS | Performed by: INTERNAL MEDICINE

## 2018-10-01 PROCEDURE — 99214 OFFICE O/P EST MOD 30 MIN: CPT | Performed by: INTERNAL MEDICINE

## 2018-10-01 NOTE — PROGRESS NOTES
Amena Snell is a 77year old male. Patient presents with: Follow - Up  Atrial Fibrillation    HPI:   Patient has chronic atrial fibrillation on rate control anticoagulation.   He is doing well and is without symptom    Current Outpatient Medications:  ZULEIMA appears well-developed and well-nourished. Cardiovascular: Normal rate, normal heart sounds and intact distal pulses. An irregularly irregular rhythm present. Pulmonary/Chest: Effort normal and breath sounds normal.   Abdominal: Soft.  Bowel sounds are

## 2018-11-27 ENCOUNTER — OFFICE VISIT (OUTPATIENT)
Dept: FAMILY MEDICINE CLINIC | Facility: CLINIC | Age: 78
End: 2018-11-27
Payer: MEDICARE

## 2018-11-27 VITALS
TEMPERATURE: 97 F | HEART RATE: 65 BPM | WEIGHT: 208 LBS | SYSTOLIC BLOOD PRESSURE: 154 MMHG | DIASTOLIC BLOOD PRESSURE: 65 MMHG | BODY MASS INDEX: 30 KG/M2

## 2018-11-27 DIAGNOSIS — M79.604 RIGHT LEG PAIN: ICD-10-CM

## 2018-11-27 DIAGNOSIS — E05.90 HYPERTHYROIDISM: Primary | ICD-10-CM

## 2018-11-27 DIAGNOSIS — I73.9 PVD (PERIPHERAL VASCULAR DISEASE) (HCC): ICD-10-CM

## 2018-11-27 PROCEDURE — 99214 OFFICE O/P EST MOD 30 MIN: CPT | Performed by: FAMILY MEDICINE

## 2018-11-27 PROCEDURE — G0463 HOSPITAL OUTPT CLINIC VISIT: HCPCS | Performed by: FAMILY MEDICINE

## 2018-11-27 RX ORDER — PROPYLTHIOURACIL 50 MG/1
TABLET ORAL
Qty: 180 TABLET | Refills: 5 | Status: SHIPPED | OUTPATIENT
Start: 2018-11-27 | End: 2018-12-13

## 2018-11-28 NOTE — PROGRESS NOTES
HPI:    Patient ID: Anamaria Pepe is a 77year old male. HPI  Patient presents with:  Leg Pain: 6 month follow up, LT leg pain, rash on LT leg, PT DECLINED FLU SHOT        Review of Systems   Constitutional: Negative. Respiratory: Negative.     Cardio current medical conditions including mild dementia.   Orders Placed This Encounter      Thyroxine, Free [E]      TSH [E]      CBC With Differential With Platelet      Basic Metabolic Panel (8) [E]      Meds This Visit:  Requested Prescriptions     Signed Pr

## 2018-12-03 ENCOUNTER — APPOINTMENT (OUTPATIENT)
Dept: LAB | Age: 78
End: 2018-12-03
Attending: FAMILY MEDICINE
Payer: MEDICARE

## 2018-12-03 ENCOUNTER — LAB ENCOUNTER (OUTPATIENT)
Dept: LAB | Age: 78
End: 2018-12-03
Attending: FAMILY MEDICINE
Payer: MEDICARE

## 2018-12-03 ENCOUNTER — HOSPITAL ENCOUNTER (OUTPATIENT)
Dept: GENERAL RADIOLOGY | Age: 78
Discharge: HOME | End: 2018-12-03
Attending: FAMILY MEDICINE
Payer: MEDICARE

## 2018-12-03 DIAGNOSIS — M79.604 RIGHT LEG PAIN: ICD-10-CM

## 2018-12-03 DIAGNOSIS — E05.90 HYPERTHYROIDISM: ICD-10-CM

## 2018-12-03 PROCEDURE — 73590 X-RAY EXAM OF LOWER LEG: CPT | Performed by: FAMILY MEDICINE

## 2018-12-03 PROCEDURE — 85025 COMPLETE CBC W/AUTO DIFF WBC: CPT

## 2018-12-03 PROCEDURE — 84443 ASSAY THYROID STIM HORMONE: CPT

## 2018-12-03 PROCEDURE — 36415 COLL VENOUS BLD VENIPUNCTURE: CPT

## 2018-12-03 PROCEDURE — 84439 ASSAY OF FREE THYROXINE: CPT

## 2018-12-03 PROCEDURE — 80048 BASIC METABOLIC PNL TOTAL CA: CPT

## 2018-12-11 RX ORDER — FUROSEMIDE 20 MG/1
TABLET ORAL
Qty: 180 TABLET | Refills: 0 | Status: SHIPPED | OUTPATIENT
Start: 2018-12-11 | End: 2019-03-10

## 2018-12-12 NOTE — TELEPHONE ENCOUNTER
Refill passed per [address] [place] [address] protocol.   Hypertensive Medications  Protocol Criteria:  · Appointment scheduled in the past 6 months or in the next 3 months  · BMP or CMP in the past 12 months  · Creatinine result < 2  Recent Outpatient Visits

## 2018-12-13 ENCOUNTER — TELEPHONE (OUTPATIENT)
Dept: FAMILY MEDICINE CLINIC | Facility: CLINIC | Age: 78
End: 2018-12-13

## 2018-12-13 RX ORDER — PROPYLTHIOURACIL 50 MG/1
TABLET ORAL
Qty: 270 TABLET | Refills: 5 | Status: SHIPPED | OUTPATIENT
Start: 2018-12-13 | End: 2019-04-16

## 2018-12-13 NOTE — TELEPHONE ENCOUNTER
Dr Taty Glover, please advise on 2 issues. 1. Advised patient's son-in-law (on HIPAA) on Dr. Sukhdeep Lopez information and recommendations.  He verbalized understanding, but stated that the pharmacy had not given the patient the correct amount of medication so t

## 2018-12-13 NOTE — TELEPHONE ENCOUNTER
Created letter for patient family due to some legal issue that occurred in public . See letter and inform.

## 2018-12-13 NOTE — TELEPHONE ENCOUNTER
Pt's son in law called in requesting to speak with a nurse in regards to pt's lab results from 12/3. Son in law Bladimir Rasheed is on HIPAA.     Please advise

## 2019-01-15 ENCOUNTER — OFFICE VISIT (OUTPATIENT)
Dept: FAMILY MEDICINE CLINIC | Facility: CLINIC | Age: 79
End: 2019-01-15
Payer: MEDICARE

## 2019-01-15 VITALS
SYSTOLIC BLOOD PRESSURE: 125 MMHG | WEIGHT: 208 LBS | TEMPERATURE: 98 F | DIASTOLIC BLOOD PRESSURE: 74 MMHG | HEART RATE: 73 BPM | HEIGHT: 69.5 IN | BODY MASS INDEX: 30.12 KG/M2

## 2019-01-15 DIAGNOSIS — B02.7 DISSEMINATED HERPES ZOSTER: ICD-10-CM

## 2019-01-15 DIAGNOSIS — R35.0 FREQUENT URINATION: Primary | ICD-10-CM

## 2019-01-15 LAB
APPEARANCE: CLEAR
BILIRUBIN: NEGATIVE
GLUCOSE (URINE DIPSTICK): NEGATIVE MG/DL
KETONES (URINE DIPSTICK): NEGATIVE MG/DL
LEUKOCYTES: NEGATIVE
MULTISTIX LOT#: NORMAL NUMERIC
NITRITE, URINE: NEGATIVE
OCCULT BLOOD: NEGATIVE
PH, URINE: 6 (ref 4.5–8)
PROTEIN (URINE DIPSTICK): NEGATIVE MG/DL
SPECIFIC GRAVITY: 1.01 (ref 1–1.03)
URINE-COLOR: YELLOW
UROBILINOGEN,SEMI-QN: 0.2 MG/DL (ref 0–1.9)

## 2019-01-15 PROCEDURE — 81002 URINALYSIS NONAUTO W/O SCOPE: CPT | Performed by: FAMILY MEDICINE

## 2019-01-15 PROCEDURE — 99213 OFFICE O/P EST LOW 20 MIN: CPT | Performed by: FAMILY MEDICINE

## 2019-01-15 PROCEDURE — G0463 HOSPITAL OUTPT CLINIC VISIT: HCPCS | Performed by: FAMILY MEDICINE

## 2019-01-15 RX ORDER — PREDNISONE 10 MG/1
TABLET ORAL
Qty: 15 TABLET | Refills: 0 | Status: SHIPPED | OUTPATIENT
Start: 2019-01-15 | End: 2019-04-08 | Stop reason: ALTCHOICE

## 2019-01-15 RX ORDER — VALACYCLOVIR HYDROCHLORIDE 1 G/1
1 TABLET, FILM COATED ORAL EVERY 12 HOURS SCHEDULED
Qty: 14 TABLET | Refills: 0 | Status: SHIPPED | OUTPATIENT
Start: 2019-01-15 | End: 2019-01-22

## 2019-01-15 NOTE — PROGRESS NOTES
HPI:    Patient ID: Denver Duane is a 77year old male. HPI  Patient presents with:  Rash: on LT side of abdomen, with itching, has sharp pain, frequent urination denies burning senstation       Review of Systems   Constitutional: Negative.     Skin: Po • ValACYclovir HCl 1 G Oral Tab 14 tablet 0     Sig: Take 1 tablet (1,000 mg total) by mouth every 12 (twelve) hours for 7 days.    • predniSONE 10 MG Oral Tab 15 tablet 0     Sig: Take 1 tab three times daily for 3 days then 1 tab twice daily for 3 days

## 2019-01-17 RX ORDER — ATORVASTATIN CALCIUM 10 MG/1
TABLET, FILM COATED ORAL
Qty: 90 TABLET | Refills: 0 | Status: SHIPPED | OUTPATIENT
Start: 2019-01-17 | End: 2019-04-15

## 2019-03-10 RX ORDER — FUROSEMIDE 20 MG/1
TABLET ORAL
Qty: 180 TABLET | Refills: 0 | Status: SHIPPED | OUTPATIENT
Start: 2019-03-10 | End: 2019-06-05

## 2019-04-08 ENCOUNTER — OFFICE VISIT (OUTPATIENT)
Dept: FAMILY MEDICINE CLINIC | Facility: CLINIC | Age: 79
End: 2019-04-08
Payer: MEDICARE

## 2019-04-08 VITALS
BODY MASS INDEX: 31 KG/M2 | SYSTOLIC BLOOD PRESSURE: 127 MMHG | WEIGHT: 216 LBS | HEART RATE: 74 BPM | DIASTOLIC BLOOD PRESSURE: 76 MMHG

## 2019-04-08 DIAGNOSIS — E05.90 HYPERTHYROIDISM: Primary | ICD-10-CM

## 2019-04-08 DIAGNOSIS — I10 ESSENTIAL HYPERTENSION: ICD-10-CM

## 2019-04-08 PROCEDURE — 99214 OFFICE O/P EST MOD 30 MIN: CPT | Performed by: FAMILY MEDICINE

## 2019-04-08 PROCEDURE — G0463 HOSPITAL OUTPT CLINIC VISIT: HCPCS | Performed by: FAMILY MEDICINE

## 2019-04-08 NOTE — PROGRESS NOTES
HPI:    Patient ID: Yuri Geronimo is a 77year old male. HPI  Patient presents with:  Thyroid Problem: f/u hypothyroidism, will need thyroid level done    Review of Systems   Constitutional: Negative. Respiratory: Negative.     Cardiovascular: Negativ

## 2019-04-11 ENCOUNTER — APPOINTMENT (OUTPATIENT)
Dept: LAB | Age: 79
End: 2019-04-11
Attending: FAMILY MEDICINE
Payer: MEDICARE

## 2019-04-11 DIAGNOSIS — E05.90 HYPERTHYROIDISM: ICD-10-CM

## 2019-04-11 PROCEDURE — 84439 ASSAY OF FREE THYROXINE: CPT

## 2019-04-11 PROCEDURE — 84443 ASSAY THYROID STIM HORMONE: CPT

## 2019-04-11 PROCEDURE — 36415 COLL VENOUS BLD VENIPUNCTURE: CPT

## 2019-04-15 RX ORDER — ATORVASTATIN CALCIUM 10 MG/1
TABLET, FILM COATED ORAL
Qty: 90 TABLET | Refills: 1 | Status: SHIPPED | OUTPATIENT
Start: 2019-04-15 | End: 2019-09-30

## 2019-06-05 RX ORDER — FUROSEMIDE 20 MG/1
TABLET ORAL
Qty: 180 TABLET | Refills: 1 | Status: SHIPPED | OUTPATIENT
Start: 2019-06-05 | End: 2019-11-29

## 2019-06-06 NOTE — TELEPHONE ENCOUNTER
Refill passed per [facility] protocol.     Requested Prescriptions   Pending Prescriptions Disp Refills   • FUROSEMIDE 20 MG Oral Tab [Pharmacy Med Name: Furosemide 20 Mg Tab Solc] 180 tablet 1     Sig: TAKE TWO TABLETS BY MOUTH DAILY       Hypertensiv

## 2019-07-10 ENCOUNTER — TELEPHONE (OUTPATIENT)
Dept: INTERNAL MEDICINE CLINIC | Facility: CLINIC | Age: 79
End: 2019-07-10

## 2019-07-10 DIAGNOSIS — I48.20 CHRONIC ATRIAL FIBRILLATION (HCC): ICD-10-CM

## 2019-07-10 NOTE — TELEPHONE ENCOUNTER
Current Outpatient Medications:   •  Rivaroxaban (XARELTO) 20 MG Oral Tab, Take 1 tablet (20 mg total) by mouth daily with food. , Disp: 90 tablet, Rfl: 1

## 2019-07-10 NOTE — TELEPHONE ENCOUNTER
NEEDS APPOINTMENT    S/w Son-In Law, Taci, and appt was schedule for next available with Dr. Geoff Boyd 08/19/19   Anticoagulant Medications  Protocol Criteria:  · Appointment scheduled with Cardiology in the past 6 months or in the next 3 months  · BMP or CMP i

## 2019-07-23 NOTE — TELEPHONE ENCOUNTER
Hyperthyroid Medications  Protocol Criteria:  Appointment scheduled in the past 6 months or the next 3 months  TSH resulted in the past 6 months  Recent Outpatient Visits            3 months ago Hyperthyroidism    [address], [address] [place], [place]    Office Visit    6 months ago Frequent urination    [address], [address] [facility]    Office Visit    7 months ago Hyperthyroidism    [address], [address] [place], [place]    Office Visit    9 months ago Chronic atrial fibrillation [facility])    [facility] Cardiology Maggie Marmolejo MD    Office Visit    1 year ago Hyperthyroidism    [address], [address] [place], [place]    Office Visit        Future Appointments       Provider Department Appt Notes    In 3 weeks Maggie Marmolejo MD [address] [facility] Cardiology follow up         Lab Results   Component Value Date    TSH <0.005 (L) 04/11/2019    THYROIDFUNC 0.09 (L) 03/24/2016

## 2019-07-26 RX ORDER — PROPYLTHIOURACIL 50 MG/1
TABLET ORAL
Qty: 270 TABLET | Refills: 4 | Status: SHIPPED | OUTPATIENT
Start: 2019-07-26 | End: 2019-08-19

## 2019-08-19 ENCOUNTER — OFFICE VISIT (OUTPATIENT)
Dept: CARDIOLOGY CLINIC | Facility: CLINIC | Age: 79
End: 2019-08-19
Payer: MEDICARE

## 2019-08-19 VITALS
SYSTOLIC BLOOD PRESSURE: 127 MMHG | BODY MASS INDEX: 31 KG/M2 | WEIGHT: 210 LBS | RESPIRATION RATE: 20 BRPM | DIASTOLIC BLOOD PRESSURE: 69 MMHG | HEART RATE: 56 BPM | OXYGEN SATURATION: 95 %

## 2019-08-19 DIAGNOSIS — I48.20 CHRONIC ATRIAL FIBRILLATION (HCC): Primary | ICD-10-CM

## 2019-08-19 DIAGNOSIS — R07.9 CHEST PAIN OF UNCERTAIN ETIOLOGY: ICD-10-CM

## 2019-08-19 PROCEDURE — G0463 HOSPITAL OUTPT CLINIC VISIT: HCPCS | Performed by: INTERNAL MEDICINE

## 2019-08-19 PROCEDURE — 99214 OFFICE O/P EST MOD 30 MIN: CPT | Performed by: INTERNAL MEDICINE

## 2019-08-19 NOTE — PROGRESS NOTES
Name:  Meena Londono  : 1940    Date of consultation:   2019    Referring physician: Minoo Spivey DO    Reason for Visit:  Patient presents with:   Follow - Up  Atrial Fibrillation  Hypertension  Chest Pain: Left side of chest       HPI: 12/14/2015   • Hypertension 12/14/2015   • Hyperthyroidism    • Meibomian gland dysfunction (MGD) of upper and lower lids of both eyes 12/22/2015   • Neck mass     left-FNA   • Unspecified essential hypertension       Social History:  Social History    Tob 12/03/2018    K 4.2 12/03/2018     12/03/2018    CO2 31 12/03/2018        ASSESSMENT AND PLAN   1. Chronic atrial fibrillation (HCC)  Stable, continue as is. See me back in 1 year otherwise    2.  Chest pain of uncertain etiology  Left-sided, noncard

## 2019-08-19 NOTE — PATIENT INSTRUCTIONS
Take Tylenol twice a day for 1 week. If symptoms are not better call us and we will order chest x-ray.   Otherwise see us back in 1 year or as needed

## 2019-08-22 ENCOUNTER — OFFICE VISIT (OUTPATIENT)
Dept: FAMILY MEDICINE CLINIC | Facility: CLINIC | Age: 79
End: 2019-08-22
Payer: MEDICARE

## 2019-08-22 VITALS
WEIGHT: 211 LBS | DIASTOLIC BLOOD PRESSURE: 73 MMHG | BODY MASS INDEX: 31 KG/M2 | HEART RATE: 77 BPM | SYSTOLIC BLOOD PRESSURE: 135 MMHG

## 2019-08-22 DIAGNOSIS — H53.9 VISION CHANGES: ICD-10-CM

## 2019-08-22 DIAGNOSIS — R10.9 ABDOMINAL PAIN, UNSPECIFIED ABDOMINAL LOCATION: ICD-10-CM

## 2019-08-22 DIAGNOSIS — E05.90 HYPERTHYROIDISM: Primary | ICD-10-CM

## 2019-08-22 PROCEDURE — 99214 OFFICE O/P EST MOD 30 MIN: CPT | Performed by: FAMILY MEDICINE

## 2019-08-22 PROCEDURE — G0463 HOSPITAL OUTPT CLINIC VISIT: HCPCS | Performed by: FAMILY MEDICINE

## 2019-08-23 ENCOUNTER — APPOINTMENT (OUTPATIENT)
Dept: LAB | Age: 79
End: 2019-08-23
Attending: FAMILY MEDICINE
Payer: MEDICARE

## 2019-08-23 DIAGNOSIS — E05.90 HYPERTHYROIDISM: ICD-10-CM

## 2019-08-23 DIAGNOSIS — R10.9 ABDOMINAL PAIN, UNSPECIFIED ABDOMINAL LOCATION: ICD-10-CM

## 2019-08-23 LAB
BILIRUB UR QL: NEGATIVE
CLARITY UR: CLEAR
COLOR UR: YELLOW
GLUCOSE UR-MCNC: NEGATIVE MG/DL
HGB UR QL STRIP.AUTO: NEGATIVE
KETONES UR-MCNC: NEGATIVE MG/DL
LEUKOCYTE ESTERASE UR QL STRIP.AUTO: NEGATIVE
NITRITE UR QL STRIP.AUTO: NEGATIVE
PH UR: 5 [PH] (ref 5–8)
PROT UR-MCNC: NEGATIVE MG/DL
SP GR UR STRIP: 1.02 (ref 1–1.03)
T4 FREE SERPL-MCNC: 1 NG/DL (ref 0.8–1.7)
TSI SER-ACNC: <0.005 MIU/ML (ref 0.36–3.74)
UROBILINOGEN UR STRIP-ACNC: <2
VIT C UR-MCNC: NEGATIVE MG/DL

## 2019-08-23 PROCEDURE — 81003 URINALYSIS AUTO W/O SCOPE: CPT

## 2019-08-23 PROCEDURE — 84443 ASSAY THYROID STIM HORMONE: CPT

## 2019-08-23 PROCEDURE — 36415 COLL VENOUS BLD VENIPUNCTURE: CPT

## 2019-08-23 PROCEDURE — 84439 ASSAY OF FREE THYROXINE: CPT

## 2019-08-29 ENCOUNTER — NURSE TRIAGE (OUTPATIENT)
Dept: FAMILY MEDICINE CLINIC | Facility: CLINIC | Age: 79
End: 2019-08-29

## 2019-08-29 ENCOUNTER — OFFICE VISIT (OUTPATIENT)
Dept: FAMILY MEDICINE CLINIC | Facility: CLINIC | Age: 79
End: 2019-08-29
Payer: MEDICARE

## 2019-08-29 VITALS
DIASTOLIC BLOOD PRESSURE: 74 MMHG | WEIGHT: 211 LBS | SYSTOLIC BLOOD PRESSURE: 154 MMHG | TEMPERATURE: 98 F | BODY MASS INDEX: 31 KG/M2 | HEART RATE: 74 BPM

## 2019-08-29 DIAGNOSIS — H60.311 ACUTE DIFFUSE OTITIS EXTERNA OF RIGHT EAR: Primary | ICD-10-CM

## 2019-08-29 DIAGNOSIS — F03.91 DEMENTIA WITH BEHAVIORAL DISTURBANCE, UNSPECIFIED DEMENTIA TYPE (HCC): ICD-10-CM

## 2019-08-29 PROCEDURE — G0463 HOSPITAL OUTPT CLINIC VISIT: HCPCS | Performed by: FAMILY MEDICINE

## 2019-08-29 PROCEDURE — 99214 OFFICE O/P EST MOD 30 MIN: CPT | Performed by: FAMILY MEDICINE

## 2019-08-29 RX ORDER — OFLOXACIN 3 MG/ML
5 SOLUTION AURICULAR (OTIC) 2 TIMES DAILY
Qty: 1 BOTTLE | Refills: 0 | Status: SHIPPED | OUTPATIENT
Start: 2019-08-29 | End: 2019-09-03

## 2019-08-29 RX ORDER — DONEPEZIL HYDROCHLORIDE 5 MG/1
5 TABLET, FILM COATED ORAL NIGHTLY
Qty: 30 TABLET | Refills: 0 | Status: SHIPPED | OUTPATIENT
Start: 2019-08-29 | End: 2019-09-24

## 2019-08-29 NOTE — TELEPHONE ENCOUNTER
Action Requested: Summary for Provider     []  Critical Lab, Recommendations Needed  [] Need Additional Advice  []   FYI    []   Need Orders  [] Need Medications Sent to Pharmacy  []  Other     SUMMARY: Per protocol advised OV today.  Scheduled with Dr. Kenny Cherry

## 2019-08-29 NOTE — PROGRESS NOTES
HPI:    Patient ID: Suni Seo is a [de-identified]year old male. HPI  Patient presents with:  Ear Problem: left ear bleeding  starting to have paranoid delusions  Review of Systems   Constitutional: Negative. HENT: Positive for ear pain.     Psychiatric/Behav unspecified dementia type (hcc)    Ear drops. Appears to me likely traumatic but he denies.  He is exhibiting more regular paranoid delusions; will start medication for dementia, may need to see a geriatric psychiatrist.   No orders of the defined types we

## 2019-08-29 NOTE — PROGRESS NOTES
HPI:    Patient ID: Anamaria Pepe is a [de-identified]year old male. HPI  Patient presents with:  Hyperthyroidism: f/u medication   Knee Pain: c/o right knee pain  Abdominal Pain: c/o left upper side pain    Review of Systems   Constitutional: Negative.     Respirat becoming prevalent.     Orders Placed This Encounter      TSH [E]      Thyroxine, Free [E]      Urinalysis, Routine [E]      Meds This Visit:  Requested Prescriptions      No prescriptions requested or ordered in this encounter       Imaging & Referrals:  N

## 2019-09-03 DIAGNOSIS — I48.20 CHRONIC ATRIAL FIBRILLATION (HCC): ICD-10-CM

## 2019-09-03 RX ORDER — RIVAROXABAN 20 MG/1
TABLET, FILM COATED ORAL
Qty: 30 TABLET | Refills: 5 | Status: SHIPPED | OUTPATIENT
Start: 2019-09-03 | End: 2020-02-19

## 2019-09-03 NOTE — TELEPHONE ENCOUNTER
xarelto 20 mg daily LOV reviewed. No change in this medication. Meets refill protocol criteria. Refill sent.   Anticoagulant Medications  Protocol Criteria:  · Appointment scheduled with Cardiology in the past 6 months or in the next 3 months  · BMP or CMP

## 2019-09-11 ENCOUNTER — TELEPHONE (OUTPATIENT)
Dept: OPHTHALMOLOGY | Facility: CLINIC | Age: 79
End: 2019-09-11

## 2019-09-11 NOTE — TELEPHONE ENCOUNTER
Pt son in law calling, state the pts eyes are feeling sensitive and pt has been seeing flashes of light.

## 2019-09-11 NOTE — TELEPHONE ENCOUNTER
Spoke to pts son and states that pt has been having flashing lights for 2-3 months and blurred vision x 6 months. I offered apt for tomorrow but pts son declined. I scheduled it next week Tuesday at 4:00. EE . Pt was last seen in 2016.

## 2019-09-17 ENCOUNTER — OFFICE VISIT (OUTPATIENT)
Dept: OPHTHALMOLOGY | Facility: CLINIC | Age: 79
End: 2019-09-17
Payer: MEDICARE

## 2019-09-17 DIAGNOSIS — H25.12 AGE-RELATED NUCLEAR CATARACT OF LEFT EYE: ICD-10-CM

## 2019-09-17 DIAGNOSIS — Z96.1 PSEUDOPHAKIA, RIGHT EYE: Primary | ICD-10-CM

## 2019-09-17 DIAGNOSIS — H43.393 VITREOUS FLOATERS OF BOTH EYES: ICD-10-CM

## 2019-09-17 DIAGNOSIS — H43.22 ASTEROID HYALOSIS OF LEFT EYE: ICD-10-CM

## 2019-09-17 PROCEDURE — 92015 DETERMINE REFRACTIVE STATE: CPT | Performed by: OPHTHALMOLOGY

## 2019-09-17 PROCEDURE — 65222 REMOVE FOREIGN BODY FROM EYE: CPT | Performed by: OPHTHALMOLOGY

## 2019-09-17 PROCEDURE — 92014 COMPRE OPH EXAM EST PT 1/>: CPT | Performed by: OPHTHALMOLOGY

## 2019-09-17 NOTE — PROGRESS NOTES
Juliann Garcia is a [de-identified]year old male. HPI:     HPI     Pt complains of occasional flashing lights with blurred vision and floaters in both eyes x 3-4 months. Pt denies any injury or trauma to his eyes. Pt would like an updated glasses Rx.      Last edited TAKE ONE TABLET BY MOUTH ONE TIME DAILY  (Patient taking differently: TAKES HALF  TABLET BY MOUTH ONE TIME DAILY (50mg)) Disp: 90 tablet Rfl: 0   ENALAPRIL MALEATE 20 MG Oral Tab TAKE ONE TABLET BY MOUTH ONE TIME DAILY  Disp: 90 tablet Rfl: 0   Fluticasone Left -0.25 +1.25 180    Type:  Distance only          Manifest Refraction       Sphere Cylinder [place]    Right -1.50 +2.00 015 20/20- +3.00 20/20    Left [place] +1.00 180 20/25- +3.00 20/20          Final Rx       Sphere Cylinder Axis

## 2019-09-17 NOTE — ASSESSMENT & PLAN NOTE
Discussed early cataracts with patient. Told patient that cataracts are age appropriate and they are not surgical at this time. No treatment recommended at this time. New glasses Rx given; suggest update.

## 2019-09-17 NOTE — PATIENT INSTRUCTIONS
Pseudophakia, right eye  No treatment. Suture removed from the right eye in the office today by Dr. Ambrosio Schulte with no complications. Age-related nuclear cataract of left eye  Discussed early cataracts with patient.  Told patient that cataracts are age

## 2019-09-17 NOTE — ASSESSMENT & PLAN NOTE
No treatment. Suture removed from the right eye in the office today by Dr. Lazara Blanc with no complications.

## 2019-09-25 RX ORDER — DONEPEZIL HYDROCHLORIDE 5 MG/1
TABLET, FILM COATED ORAL
Qty: 30 TABLET | Refills: 0 | Status: SHIPPED | OUTPATIENT
Start: 2019-09-25 | End: 2019-10-15

## 2019-09-25 NOTE — TELEPHONE ENCOUNTER
Refill noted. Chart reviewed. Okay to refill times one for 30 day supply. Needs to follow-up with Dr. Ruben Saucedo for further refills.

## 2019-09-30 NOTE — TELEPHONE ENCOUNTER
Please review; protocol failed.     Requested Prescriptions     Pending Prescriptions Disp Refills   • ATORVASTATIN 10 MG Oral Tab [Pharmacy Med Name: Atorvastatin Calcium 10 Mg Tab Nort] 90 tablet 0     Sig: TAKE ONE TABLET BY MOUTH ONE TIME NIGHTLY

## 2019-10-01 RX ORDER — ATORVASTATIN CALCIUM 10 MG/1
TABLET, FILM COATED ORAL
Qty: 90 TABLET | Refills: 1 | Status: SHIPPED | OUTPATIENT
Start: 2019-10-01 | End: 2020-03-24

## 2019-10-15 NOTE — PROGRESS NOTES
HPI:    Patient ID: Deshawn  is a [de-identified]year old male. Patient presents with: Follow - Up: for LT ear problem, PT DECLINED FLU SHOT         Review of Systems   Constitutional: Negative. Respiratory: Negative. Cardiovascular: Negative.     Psychi encounter diagnosis)  Essential hypertension    Tolerating medication and son believes may have helped . Less behaviors but he remains paranoid at home at times. Will increase dose again and see back in one month. Consider neurology consult after that.

## 2019-10-24 ENCOUNTER — TELEPHONE (OUTPATIENT)
Dept: OTHER | Age: 79
End: 2019-10-24

## 2019-10-24 NOTE — TELEPHONE ENCOUNTER
Pt's son stated since rx Donepezil  was increased for the last 2 days Pt has gotten worse- more confused and nervous

## 2019-10-24 NOTE — TELEPHONE ENCOUNTER
Patient's son in law Nubia Atkinson returned called, relayed Dr. Wahl Apt message below. Tasi verbalized understanding and agrees with plan of care.

## 2019-10-24 NOTE — TELEPHONE ENCOUNTER
I encourage to try a week - immediate symptom may be a side effect that will go away.   If not resolved in a week then go back to the 5mg

## 2019-11-29 RX ORDER — FUROSEMIDE 20 MG/1
TABLET ORAL
Qty: 180 TABLET | Refills: 1 | Status: SHIPPED | OUTPATIENT
Start: 2019-11-29 | End: 2020-05-26

## 2019-12-13 RX ORDER — PROPYLTHIOURACIL 50 MG/1
TABLET ORAL
Qty: 270 TABLET | Refills: 1 | Status: SHIPPED | OUTPATIENT
Start: 2019-12-13 | End: 2020-02-09

## 2019-12-14 NOTE — TELEPHONE ENCOUNTER
Refill passed per [facility] protocol.   Hyperthyroid Medications  Protocol Criteria:  Appointment scheduled in the past 6 months or the next 3 months  TSH resulted in the past 6 months  Recent Outpatient Visits            1 month ago Vascular dementia

## 2019-12-17 ENCOUNTER — OFFICE VISIT (OUTPATIENT)
Dept: FAMILY MEDICINE CLINIC | Facility: CLINIC | Age: 79
End: 2019-12-17
Payer: MEDICARE

## 2019-12-17 VITALS
WEIGHT: 208 LBS | BODY MASS INDEX: 29.78 KG/M2 | TEMPERATURE: 98 F | DIASTOLIC BLOOD PRESSURE: 76 MMHG | HEART RATE: 67 BPM | HEIGHT: 70 IN | SYSTOLIC BLOOD PRESSURE: 152 MMHG

## 2019-12-17 DIAGNOSIS — I48.0 PAROXYSMAL ATRIAL FIBRILLATION (HCC): ICD-10-CM

## 2019-12-17 DIAGNOSIS — E05.90 HYPERTHYROIDISM: Primary | ICD-10-CM

## 2019-12-17 DIAGNOSIS — E78.00 PURE HYPERCHOLESTEROLEMIA: ICD-10-CM

## 2019-12-17 DIAGNOSIS — F03.91 DEMENTIA WITH BEHAVIORAL DISTURBANCE, UNSPECIFIED DEMENTIA TYPE (HCC): ICD-10-CM

## 2019-12-17 PROCEDURE — G0463 HOSPITAL OUTPT CLINIC VISIT: HCPCS | Performed by: FAMILY MEDICINE

## 2019-12-17 PROCEDURE — 99214 OFFICE O/P EST MOD 30 MIN: CPT | Performed by: FAMILY MEDICINE

## 2019-12-17 RX ORDER — MEMANTINE HYDROCHLORIDE 5 MG/1
5 TABLET ORAL 2 TIMES DAILY
Qty: 60 TABLET | Refills: 3 | Status: SHIPPED | OUTPATIENT
Start: 2019-12-17 | End: 2020-08-29

## 2019-12-18 NOTE — PROGRESS NOTES
HPI:    Patient ID: Fatemeh Nogueira is a [de-identified]year old male. HPI  Patient presents with:  Dementia: follow up from [address] [place] [address] for dementia    Review of Systems   Constitutional: Negative. Respiratory: Negative. Cardiovascular: Negative.     Gastrointestinal: diagnosis)  Pure hypercholesterolemia  Paroxysmal atrial fibrillation (hcc)  Dementia with behavioral disturbance, unspecified dementia type (hcc)    Will add on medication. His dementia about the same and has intermittent delusions about neighbors.   Nati

## 2019-12-19 ENCOUNTER — APPOINTMENT (OUTPATIENT)
Dept: LAB | Age: 79
End: 2019-12-19
Attending: FAMILY MEDICINE
Payer: MEDICARE

## 2019-12-19 DIAGNOSIS — E78.00 PURE HYPERCHOLESTEROLEMIA: ICD-10-CM

## 2019-12-19 PROCEDURE — 36415 COLL VENOUS BLD VENIPUNCTURE: CPT

## 2019-12-19 PROCEDURE — 80053 COMPREHEN METABOLIC PANEL: CPT

## 2019-12-19 PROCEDURE — 80061 LIPID PANEL: CPT

## 2020-01-22 NOTE — TELEPHONE ENCOUNTER
Pt's son in law called , stated Pt medication for Dementia was changed at [address] 12/17/19- advised will give referral for Psych    Stated Dementia is getting worse         12/17/19 OV    ASSESSMENT/PLAN:   Hyperthyroidism  (primary encounter diagnosis)  Pur

## 2020-01-23 NOTE — TELEPHONE ENCOUNTER
Son in law returned call.  Provided information to [facility] and advised of referral.

## 2020-02-09 RX ORDER — PROPYLTHIOURACIL 50 MG/1
TABLET ORAL
Qty: 270 TABLET | Refills: 1 | Status: SHIPPED | OUTPATIENT
Start: 2020-02-09 | End: 2020-04-07

## 2020-02-09 NOTE — TELEPHONE ENCOUNTER
Refill passed per [address] [facility] protocol.   Hyperthyroid Medications  Protocol Criteria:  Appointment scheduled in the past 6 months or the next 3 months  TSH resulted in the past 6 months  Recent Outpatient Visits            1 month ago Hyperthyroidism

## 2020-02-19 DIAGNOSIS — I48.20 CHRONIC ATRIAL FIBRILLATION (HCC): ICD-10-CM

## 2020-02-19 RX ORDER — RIVAROXABAN 20 MG/1
TABLET, FILM COATED ORAL
Qty: 90 TABLET | Refills: 1 | Status: SHIPPED | OUTPATIENT
Start: 2020-02-19 | End: 2020-09-20

## 2020-02-19 NOTE — TELEPHONE ENCOUNTER
LOV with Dr. Tatyana Torres 8/19/19 (within 6 months for protocol)  Pt was advised to RTC 1 year and CPM with xarelto. Meets protocol requirements  Refills sent.

## 2020-02-19 NOTE — TELEPHONE ENCOUNTER
Anticoagulant Medications  Protocol Criteria:  · Appointment scheduled with Cardiology in the past 6 months or in the next 3 months  · BMP or CMP in the past 6 months  · Potassium: 3.1 - 4.9 mmol/L  · Creatinine is normal or increase is less than 30% from

## 2020-03-24 RX ORDER — ATORVASTATIN CALCIUM 10 MG/1
TABLET, FILM COATED ORAL
Qty: 90 TABLET | Refills: 1 | Status: SHIPPED | OUTPATIENT
Start: 2020-03-24 | End: 2020-08-29

## 2020-03-24 NOTE — TELEPHONE ENCOUNTER
Refill noted. Chart reviewed. Okay to fill times one for 90 day supply with one additional refill. Refilled on behalf of Dr. Nina.

## 2020-04-07 RX ORDER — DONEPEZIL HYDROCHLORIDE 10 MG/1
TABLET, FILM COATED ORAL
Qty: 30 TABLET | Refills: 5 | Status: SHIPPED | OUTPATIENT
Start: 2020-04-07 | End: 2020-08-29

## 2020-04-07 RX ORDER — PROPYLTHIOURACIL 50 MG/1
TABLET ORAL
Qty: 270 TABLET | Refills: 0 | Status: SHIPPED | OUTPATIENT
Start: 2020-04-07 | End: 2020-06-20

## 2020-05-26 RX ORDER — FUROSEMIDE 20 MG/1
TABLET ORAL
Qty: 180 TABLET | Refills: 0 | Status: SHIPPED | OUTPATIENT
Start: 2020-05-26 | End: 2020-08-29

## 2020-06-02 RX ORDER — ATENOLOL 100 MG/1
TABLET ORAL
Qty: 90 TABLET | Refills: 0 | Status: SHIPPED | OUTPATIENT
Start: 2020-06-02 | End: 2020-08-28

## 2020-06-02 RX ORDER — ENALAPRIL MALEATE 20 MG/1
TABLET ORAL
Qty: 90 TABLET | Refills: 0 | Status: SHIPPED | OUTPATIENT
Start: 2020-06-02 | End: 2020-08-28

## 2020-06-02 NOTE — TELEPHONE ENCOUNTER
Refill noted. Chart reviewed. Okay to fill times one for 90 day supply with no additional refills. Needs to schedule virtual visit for follow-up on dementia and hypertension. Refilled on behalf of Dr. Bobby Bush.

## 2020-06-20 RX ORDER — PROPYLTHIOURACIL 50 MG/1
TABLET ORAL
Qty: 270 TABLET | Refills: 3 | Status: SHIPPED | OUTPATIENT
Start: 2020-06-20 | End: 2020-11-13

## 2020-08-28 RX ORDER — ATENOLOL 100 MG/1
TABLET ORAL
Qty: 90 TABLET | Refills: 0 | Status: SHIPPED | OUTPATIENT
Start: 2020-08-28 | End: 2020-10-19

## 2020-08-28 RX ORDER — ENALAPRIL MALEATE 20 MG/1
TABLET ORAL
Qty: 90 TABLET | Refills: 0 | Status: SHIPPED | OUTPATIENT
Start: 2020-08-28 | End: 2020-10-19

## 2020-08-29 ENCOUNTER — OFFICE VISIT (OUTPATIENT)
Dept: FAMILY MEDICINE CLINIC | Facility: CLINIC | Age: 80
End: 2020-08-29
Payer: MEDICARE

## 2020-08-29 VITALS
HEIGHT: 70 IN | WEIGHT: 215.19 LBS | SYSTOLIC BLOOD PRESSURE: 101 MMHG | HEART RATE: 43 BPM | DIASTOLIC BLOOD PRESSURE: 48 MMHG | BODY MASS INDEX: 30.81 KG/M2 | TEMPERATURE: 98 F

## 2020-08-29 DIAGNOSIS — F03.91 DEMENTIA WITH BEHAVIORAL DISTURBANCE, UNSPECIFIED DEMENTIA TYPE (HCC): ICD-10-CM

## 2020-08-29 DIAGNOSIS — E05.90 HYPERTHYROIDISM: Primary | ICD-10-CM

## 2020-08-29 DIAGNOSIS — E78.00 PURE HYPERCHOLESTEROLEMIA: ICD-10-CM

## 2020-08-29 DIAGNOSIS — F01.51 VASCULAR DEMENTIA WITH BEHAVIOR DISTURBANCE (HCC): ICD-10-CM

## 2020-08-29 DIAGNOSIS — I48.0 PAROXYSMAL ATRIAL FIBRILLATION (HCC): ICD-10-CM

## 2020-08-29 DIAGNOSIS — I10 ESSENTIAL HYPERTENSION: ICD-10-CM

## 2020-08-29 PROCEDURE — 99214 OFFICE O/P EST MOD 30 MIN: CPT | Performed by: FAMILY MEDICINE

## 2020-08-29 PROCEDURE — G0463 HOSPITAL OUTPT CLINIC VISIT: HCPCS | Performed by: FAMILY MEDICINE

## 2020-08-29 RX ORDER — DONEPEZIL HYDROCHLORIDE 10 MG/1
10 TABLET, FILM COATED ORAL EVERY EVENING
Qty: 90 TABLET | Refills: 1 | Status: SHIPPED | OUTPATIENT
Start: 2020-08-29 | End: 2021-03-09

## 2020-08-29 RX ORDER — RISPERIDONE 0.5 MG/1
TABLET, FILM COATED ORAL
COMMUNITY
Start: 2020-08-19

## 2020-08-29 RX ORDER — FUROSEMIDE 20 MG/1
40 TABLET ORAL DAILY
Qty: 180 TABLET | Refills: 1 | Status: SHIPPED | OUTPATIENT
Start: 2020-08-29 | End: 2021-02-22

## 2020-08-29 RX ORDER — ATORVASTATIN CALCIUM 10 MG/1
10 TABLET, FILM COATED ORAL NIGHTLY
Qty: 90 TABLET | Refills: 1 | Status: SHIPPED | OUTPATIENT
Start: 2020-08-29 | End: 2020-10-19

## 2020-08-29 RX ORDER — MEMANTINE HYDROCHLORIDE 5 MG/1
5 TABLET ORAL 2 TIMES DAILY
Qty: 180 TABLET | Refills: 1 | Status: SHIPPED | OUTPATIENT
Start: 2020-08-29 | End: 2021-02-22

## 2020-08-29 NOTE — PROGRESS NOTES
HPI:    Patient ID: Gege Jenkins is a 79year old male. HPI    Review of Systems   Constitutional: Negative. Respiratory: Negative. Cardiovascular: Negative. Neurological: Negative.     Psychiatric/Behavioral: Positive for behavioral problems, cardiology. Vascular dementia with behavior disturbance (hcc)  Dementia with behavioral disturbance, unspecified dementia type (hcc)  Stable continue present medication.   Orders Placed This Encounter      CBC With Differential With Platelet      Comp [place]

## 2020-09-01 ENCOUNTER — LAB ENCOUNTER (OUTPATIENT)
Dept: LAB | Age: 80
End: 2020-09-01
Attending: FAMILY MEDICINE
Payer: MEDICARE

## 2020-09-01 DIAGNOSIS — E05.90 HYPERTHYROIDISM: ICD-10-CM

## 2020-09-01 DIAGNOSIS — I10 ESSENTIAL HYPERTENSION: ICD-10-CM

## 2020-09-01 LAB
ALBUMIN SERPL-MCNC: 3.4 G/DL (ref 3.4–5)
ALBUMIN/GLOB SERPL: 1 {RATIO} (ref 1–2)
ALP LIVER SERPL-CCNC: 74 U/L (ref 45–117)
ALT SERPL-CCNC: 29 U/L (ref 16–61)
ANION GAP SERPL CALC-SCNC: 3 MMOL/L (ref 0–18)
AST SERPL-CCNC: 14 U/L (ref 15–37)
BASOPHILS # BLD AUTO: 0.02 X10(3) UL (ref 0–0.2)
BASOPHILS NFR BLD AUTO: 0.4 %
BILIRUB SERPL-MCNC: 0.7 MG/DL (ref 0.1–2)
BUN BLD-MCNC: 27 MG/DL (ref 7–18)
BUN/CREAT SERPL: 25 (ref 10–20)
CALCIUM BLD-MCNC: 9.2 MG/DL (ref 8.5–10.1)
CHLORIDE SERPL-SCNC: 109 MMOL/L (ref 98–112)
CHOLEST SMN-MCNC: 165 MG/DL (ref ?–200)
CO2 SERPL-SCNC: 32 MMOL/L (ref 21–32)
CREAT BLD-MCNC: 1.08 MG/DL (ref 0.7–1.3)
DEPRECATED RDW RBC AUTO: 45.1 FL (ref 35.1–46.3)
EOSINOPHIL # BLD AUTO: 0.08 X10(3) UL (ref 0–0.7)
EOSINOPHIL NFR BLD AUTO: 1.5 %
ERYTHROCYTE [DISTWIDTH] IN BLOOD BY AUTOMATED COUNT: 13.8 % (ref 11–15)
GLOBULIN PLAS-MCNC: 3.5 G/DL (ref 2.8–4.4)
GLUCOSE BLD-MCNC: 99 MG/DL (ref 70–99)
HCT VFR BLD AUTO: 42.5 % (ref 39–53)
HDLC SERPL-MCNC: 50 MG/DL (ref 40–59)
HGB BLD-MCNC: 14.3 G/DL (ref 13–17.5)
IMM GRANULOCYTES # BLD AUTO: 0.01 X10(3) UL (ref 0–1)
IMM GRANULOCYTES NFR BLD: 0.2 %
LDLC SERPL CALC-MCNC: 96 MG/DL (ref ?–100)
LYMPHOCYTES # BLD AUTO: 1.9 X10(3) UL (ref 1–4)
LYMPHOCYTES NFR BLD AUTO: 35.8 %
M PROTEIN MFR SERPL ELPH: 6.9 G/DL (ref 6.4–8.2)
MCH RBC QN AUTO: 30.3 PG (ref 26–34)
MCHC RBC AUTO-ENTMCNC: 33.6 G/DL (ref 31–37)
MCV RBC AUTO: 90 FL (ref 80–100)
MONOCYTES # BLD AUTO: 0.52 X10(3) UL (ref 0.1–1)
MONOCYTES NFR BLD AUTO: 9.8 %
NEUTROPHILS # BLD AUTO: 2.78 X10 (3) UL (ref 1.5–7.7)
NEUTROPHILS # BLD AUTO: 2.78 X10(3) UL (ref 1.5–7.7)
NEUTROPHILS NFR BLD AUTO: 52.3 %
NONHDLC SERPL-MCNC: 115 MG/DL (ref ?–130)
OSMOLALITY SERPL CALC.SUM OF ELEC: 303 MOSM/KG (ref 275–295)
PATIENT FASTING Y/N/NP: YES
PATIENT FASTING Y/N/NP: YES
PLATELET # BLD AUTO: 129 10(3)UL (ref 150–450)
POTASSIUM SERPL-SCNC: 3.7 MMOL/L (ref 3.5–5.1)
RBC # BLD AUTO: 4.72 X10(6)UL (ref 3.8–5.8)
SODIUM SERPL-SCNC: 144 MMOL/L (ref 136–145)
T4 FREE SERPL-MCNC: 0.7 NG/DL (ref 0.8–1.7)
TRIGL SERPL-MCNC: 96 MG/DL (ref 30–149)
TSI SER-ACNC: 0.51 MIU/ML (ref 0.36–3.74)
VLDLC SERPL CALC-MCNC: 19 MG/DL (ref 0–30)
WBC # BLD AUTO: 5.3 X10(3) UL (ref 4–11)

## 2020-09-01 PROCEDURE — 84443 ASSAY THYROID STIM HORMONE: CPT

## 2020-09-01 PROCEDURE — 84439 ASSAY OF FREE THYROXINE: CPT

## 2020-09-01 PROCEDURE — 80061 LIPID PANEL: CPT

## 2020-09-01 PROCEDURE — 36415 COLL VENOUS BLD VENIPUNCTURE: CPT

## 2020-09-01 PROCEDURE — 80053 COMPREHEN METABOLIC PANEL: CPT

## 2020-09-01 PROCEDURE — 85025 COMPLETE CBC W/AUTO DIFF WBC: CPT

## 2020-09-20 DIAGNOSIS — I48.20 CHRONIC ATRIAL FIBRILLATION (HCC): ICD-10-CM

## 2020-10-13 NOTE — TELEPHONE ENCOUNTER
Xarelto  Last office visit 2018 with Dr. Jr Burton; otherwise meets protocol  With Jf  Cassie Pitts ID [id number]  Unable to reach [facility], call placed to [address] [place] [address] (HIPAA verified) messaged left for patient to schedule follow up with Dr. Jr Burton prior to larger Component Value Date    HGB 14.3 09/01/2020    .0 (L) 09/01/2020

## 2020-10-19 ENCOUNTER — OFFICE VISIT (OUTPATIENT)
Dept: CARDIOLOGY CLINIC | Facility: CLINIC | Age: 80
End: 2020-10-19
Payer: MEDICARE

## 2020-10-19 ENCOUNTER — TELEPHONE (OUTPATIENT)
Dept: CARDIOLOGY CLINIC | Facility: CLINIC | Age: 80
End: 2020-10-19

## 2020-10-19 VITALS
DIASTOLIC BLOOD PRESSURE: 56 MMHG | BODY MASS INDEX: 31.07 KG/M2 | SYSTOLIC BLOOD PRESSURE: 105 MMHG | HEART RATE: 68 BPM | HEIGHT: 70 IN | WEIGHT: 217 LBS

## 2020-10-19 DIAGNOSIS — I48.20 CHRONIC ATRIAL FIBRILLATION (HCC): ICD-10-CM

## 2020-10-19 PROCEDURE — G0463 HOSPITAL OUTPT CLINIC VISIT: HCPCS | Performed by: INTERNAL MEDICINE

## 2020-10-19 PROCEDURE — 99214 OFFICE O/P EST MOD 30 MIN: CPT | Performed by: INTERNAL MEDICINE

## 2020-10-19 RX ORDER — ENALAPRIL MALEATE 20 MG/1
20 TABLET ORAL DAILY
Qty: 90 TABLET | Refills: 3 | Status: SHIPPED | OUTPATIENT
Start: 2020-10-19 | End: 2021-11-11

## 2020-10-19 RX ORDER — ATENOLOL 100 MG/1
100 TABLET ORAL DAILY
Qty: 90 TABLET | Refills: 3 | Status: SHIPPED | OUTPATIENT
Start: 2020-10-19

## 2020-10-19 RX ORDER — ATORVASTATIN CALCIUM 10 MG/1
10 TABLET, FILM COATED ORAL NIGHTLY
Qty: 90 TABLET | Refills: 3 | Status: SHIPPED | OUTPATIENT
Start: 2020-10-19

## 2020-10-19 NOTE — PROGRESS NOTES
Name:  Suni Seo  : 1940    Date of consultation:   10/19/2020    Referring physician: Les Campos DO    Reason for Visit:  Patient presents with:  Atrial Fibrillation: annual f/u, doing well, denies chest pain, SOB, fatigue, dizziness and lower lids of both eyes 12/22/2015   • Neck mass     left-FNA   • Unspecified essential hypertension       Social History:  Social History    Tobacco Use      Smoking status: Never Smoker      Smokeless tobacco: Never Used    Alcohol use: No    Drug us ASSESSMENT AND PLAN   1. Chronic atrial fibrillation (HCC)  Doing well, continue as is. See me in a yearly basis or as needed  - Rivaroxaban (XARELTO) 20 MG Oral Tab; Take 1 tablet (20 mg total) by mouth daily with food. Dispense: 90 tablet;  Refil

## 2020-10-19 NOTE — TELEPHONE ENCOUNTER
Daughter requesting appt sooner than first available to discuss medications. Pt last seen in August of 2019.  Please call [phone]

## 2020-11-13 RX ORDER — PROPYLTHIOURACIL 50 MG/1
TABLET ORAL
Qty: 270 TABLET | Refills: 1 | Status: SHIPPED | OUTPATIENT
Start: 2020-11-13 | End: 2021-01-16

## 2021-01-16 RX ORDER — PROPYLTHIOURACIL 50 MG/1
TABLET ORAL
Qty: 270 TABLET | Refills: 0 | Status: SHIPPED | OUTPATIENT
Start: 2021-01-16 | End: 2021-04-06

## 2021-02-22 RX ORDER — MEMANTINE HYDROCHLORIDE 5 MG/1
TABLET ORAL
Qty: 180 TABLET | Refills: 0 | Status: SHIPPED | OUTPATIENT
Start: 2021-02-22 | End: 2021-05-21

## 2021-02-22 RX ORDER — FUROSEMIDE 20 MG/1
TABLET ORAL
Qty: 180 TABLET | Refills: 0 | Status: SHIPPED | OUTPATIENT
Start: 2021-02-22 | End: 2021-05-21

## 2021-03-05 DIAGNOSIS — Z23 NEED FOR VACCINATION: ICD-10-CM

## 2021-03-09 RX ORDER — DONEPEZIL HYDROCHLORIDE 10 MG/1
10 TABLET, FILM COATED ORAL EVERY EVENING
Qty: 90 TABLET | Refills: 1 | Status: SHIPPED | OUTPATIENT
Start: 2021-03-09

## 2021-03-18 ENCOUNTER — IMMUNIZATION (OUTPATIENT)
Dept: LAB | Facility: HOSPITAL | Age: 81
End: 2021-03-18
Attending: HOSPITALIST
Payer: MEDICARE

## 2021-03-18 DIAGNOSIS — Z23 NEED FOR VACCINATION: Primary | ICD-10-CM

## 2021-03-18 PROCEDURE — 0011A SARSCOV2 VAC 100MCG/0.5ML IM: CPT

## 2021-04-06 RX ORDER — PROPYLTHIOURACIL 50 MG/1
TABLET ORAL
Qty: 270 TABLET | Refills: 0 | Status: SHIPPED | OUTPATIENT
Start: 2021-04-06 | End: 2021-06-11

## 2021-04-15 ENCOUNTER — IMMUNIZATION (OUTPATIENT)
Dept: LAB | Facility: HOSPITAL | Age: 81
End: 2021-04-15
Attending: EMERGENCY MEDICINE
Payer: MEDICARE

## 2021-04-15 DIAGNOSIS — Z23 NEED FOR VACCINATION: Primary | ICD-10-CM

## 2021-04-15 PROCEDURE — 0012A SARSCOV2 VAC 100MCG/0.5ML IM: CPT

## 2021-05-21 RX ORDER — FUROSEMIDE 20 MG/1
TABLET ORAL
Qty: 180 TABLET | Refills: 0 | Status: SHIPPED | OUTPATIENT
Start: 2021-05-21 | End: 2021-10-14

## 2021-05-21 RX ORDER — MEMANTINE HYDROCHLORIDE 5 MG/1
TABLET ORAL
Qty: 180 TABLET | Refills: 0 | Status: SHIPPED | OUTPATIENT
Start: 2021-05-21

## 2021-06-10 ENCOUNTER — LAB ENCOUNTER (OUTPATIENT)
Dept: LAB | Age: 81
End: 2021-06-10
Attending: FAMILY MEDICINE
Payer: MEDICARE

## 2021-06-10 DIAGNOSIS — E05.90 HYPERTHYROIDISM: ICD-10-CM

## 2021-06-10 PROCEDURE — 36415 COLL VENOUS BLD VENIPUNCTURE: CPT

## 2021-06-10 PROCEDURE — 84443 ASSAY THYROID STIM HORMONE: CPT

## 2021-06-10 PROCEDURE — 84439 ASSAY OF FREE THYROXINE: CPT

## 2021-06-11 RX ORDER — PROPYLTHIOURACIL 50 MG/1
TABLET ORAL
Qty: 270 TABLET | Refills: 2 | Status: SHIPPED | OUTPATIENT
Start: 2021-06-11 | End: 2022-01-11

## 2021-06-19 DIAGNOSIS — E05.90 HYPERTHYROIDISM: Primary | ICD-10-CM

## 2021-07-14 ENCOUNTER — TELEPHONE (OUTPATIENT)
Dept: FAMILY MEDICINE CLINIC | Facility: CLINIC | Age: 81
End: 2021-07-14

## 2021-07-14 DIAGNOSIS — Z91.89 AT INCREASED RISK OF EXPOSURE TO COVID-19 VIRUS: Primary | ICD-10-CM

## 2021-07-14 NOTE — TELEPHONE ENCOUNTER
Patient needs an order for a COVID test for travel. Patient is leaving on 7/18/21 Sunday. Please call when approved [phone].

## 2021-07-16 ENCOUNTER — LAB ENCOUNTER (OUTPATIENT)
Dept: LAB | Age: 81
End: 2021-07-16
Attending: FAMILY MEDICINE
Payer: MEDICARE

## 2021-07-16 DIAGNOSIS — Z91.89 AT INCREASED RISK OF EXPOSURE TO COVID-19 VIRUS: ICD-10-CM

## 2021-07-17 LAB — SARS-COV-2 RNA RESP QL NAA+PROBE: NOT DETECTED

## 2021-10-12 NOTE — TELEPHONE ENCOUNTER
Please review. Protocol failed / No protocol.     Requested Prescriptions   Pending Prescriptions Disp Refills    FUROSEMIDE 20 MG Oral Tab [Pharmacy Med Name: Furosemide 20 Mg Tab Krystal] 180 tablet 0     Sig: TAKE TWO TABLETS BY MOUTH DAILY        Hypertensive Medications Protocol Failed - 10/11/2021  6:57 PM        Failed - CMP or BMP in past 12 months        Failed - Appointment in past 6 or next 3 months        Passed - GFR Non- > 50     Lab Results   Component Value Date    GFRNAA 64 09/01/2020                           Recent Outpatient Visits              11 months ago Chronic atrial fibrillation [facility])    [facility] Jazmin Celis MD    Office Visit    1 year ago Hyperthyroidism    [facility], Miguel [place], [place], [place]    Office Visit    1 year ago Hyperthyroidism    [facility], [place], [place]    Office Visit    1 year ago Vascular dementia with behavior disturbance [facility]    Office Visit    2 years ago Pseudophakia, right eye    [facility] Miguel Forrest MD    Office Visit

## 2021-10-13 DIAGNOSIS — I48.20 CHRONIC ATRIAL FIBRILLATION (HCC): ICD-10-CM

## 2021-10-13 NOTE — TELEPHONE ENCOUNTER
xarelto  Due for labs and follow up. Last office visit 10/19/20  Anticoagulant Medications  Protocol Criteria:  · Appointment scheduled with Cardiology in the past 6 months or in the next 3 months  · BMP or CMP in the past 6 months  · Potassium: 3.1 - 4. 9

## 2021-10-14 RX ORDER — FUROSEMIDE 20 MG/1
40 TABLET ORAL DAILY
Qty: 180 TABLET | Refills: 1 | Status: SHIPPED | OUTPATIENT
Start: 2021-10-14 | End: 2022-04-06

## 2021-11-01 ENCOUNTER — LAB ENCOUNTER (OUTPATIENT)
Dept: LAB | Age: 81
End: 2021-11-01
Attending: NURSE PRACTITIONER
Payer: MEDICARE

## 2021-11-01 DIAGNOSIS — E05.90 HYPERTHYROIDISM: ICD-10-CM

## 2021-11-01 DIAGNOSIS — I48.20 CHRONIC ATRIAL FIBRILLATION (HCC): ICD-10-CM

## 2021-11-01 PROCEDURE — 80048 BASIC METABOLIC PNL TOTAL CA: CPT

## 2021-11-01 PROCEDURE — 36415 COLL VENOUS BLD VENIPUNCTURE: CPT

## 2021-11-01 PROCEDURE — 85025 COMPLETE CBC W/AUTO DIFF WBC: CPT

## 2021-11-01 PROCEDURE — 84439 ASSAY OF FREE THYROXINE: CPT

## 2021-11-01 PROCEDURE — 84443 ASSAY THYROID STIM HORMONE: CPT

## 2021-11-11 RX ORDER — ENALAPRIL MALEATE 20 MG/1
20 TABLET ORAL DAILY
Qty: 90 TABLET | Refills: 1 | Status: SHIPPED | OUTPATIENT
Start: 2021-11-11

## 2021-12-06 ENCOUNTER — OFFICE VISIT (OUTPATIENT)
Dept: CARDIOLOGY CLINIC | Facility: CLINIC | Age: 81
End: 2021-12-06
Payer: MEDICARE

## 2021-12-06 VITALS
HEART RATE: 61 BPM | HEIGHT: 70 IN | DIASTOLIC BLOOD PRESSURE: 71 MMHG | BODY MASS INDEX: 31.5 KG/M2 | WEIGHT: 220 LBS | SYSTOLIC BLOOD PRESSURE: 115 MMHG

## 2021-12-06 DIAGNOSIS — I48.21 PERMANENT ATRIAL FIBRILLATION (HCC): Primary | ICD-10-CM

## 2021-12-06 PROCEDURE — 99214 OFFICE O/P EST MOD 30 MIN: CPT | Performed by: INTERNAL MEDICINE

## 2021-12-06 NOTE — PROGRESS NOTES
Name:  Yehuda Partida  : 1940    Date of consultation:   2021    Referring physician: Dacia Steinberg DO    Reason for Visit:  Patient presents with: Follow - Up:  Annual  Atrial Fibrillation      HPI:   Patient with chronic atrial fibrillatio and lower lids of both eyes 12/22/2015   • Neck mass     left-FNA   • Unspecified essential hypertension       Social History:  Social History    Tobacco Use      Smoking status: Never Smoker      Smokeless tobacco: Never Used    Vaping Use      Vaping Use 11/01/2021     11/01/2021    CO2 28.0 11/01/2021        ASSESSMENT AND PLAN   1. Permanent atrial fibrillation (HCC)  Doing well. Rate is controlled. Is on Xarelto with normal renal function    Continue as is. See me 1 year or as needed.       Scooter Lax

## 2022-01-06 RX ORDER — PROPYLTHIOURACIL 50 MG/1
TABLET ORAL
Qty: 270 TABLET | Refills: 0 | OUTPATIENT
Start: 2022-01-06

## 2022-01-13 ENCOUNTER — HOSPITAL ENCOUNTER (OUTPATIENT)
Age: 82
Discharge: HOME | End: 2022-01-13
Payer: MEDICARE

## 2022-01-13 VITALS
TEMPERATURE: 101 F | RESPIRATION RATE: 14 BRPM | HEIGHT: 67 IN | OXYGEN SATURATION: 97 % | BODY MASS INDEX: 32.96 KG/M2 | DIASTOLIC BLOOD PRESSURE: 58 MMHG | HEART RATE: 82 BPM | WEIGHT: 210 LBS | SYSTOLIC BLOOD PRESSURE: 139 MMHG

## 2022-01-13 DIAGNOSIS — Z20.822 ENCOUNTER FOR LABORATORY TESTING FOR COVID-19 VIRUS: ICD-10-CM

## 2022-01-13 DIAGNOSIS — I48.91 ATRIAL FIBRILLATION, UNSPECIFIED TYPE (HCC): ICD-10-CM

## 2022-01-13 DIAGNOSIS — R06.02 SHORTNESS OF BREATH: ICD-10-CM

## 2022-01-13 DIAGNOSIS — J02.0 STREPTOCOCCAL SORE THROAT: Primary | ICD-10-CM

## 2022-01-13 LAB
S PYO AG THROAT QL: POSITIVE
SARS-COV-2 RNA RESP QL NAA+PROBE: DETECTED

## 2022-01-13 PROCEDURE — 93000 ELECTROCARDIOGRAM COMPLETE: CPT | Performed by: EMERGENCY MEDICINE

## 2022-01-13 PROCEDURE — U0002 COVID-19 LAB TEST NON-CDC: HCPCS | Performed by: EMERGENCY MEDICINE

## 2022-01-13 PROCEDURE — 99214 OFFICE O/P EST MOD 30 MIN: CPT | Performed by: EMERGENCY MEDICINE

## 2022-01-13 PROCEDURE — 87880 STREP A ASSAY W/OPTIC: CPT | Performed by: EMERGENCY MEDICINE

## 2022-01-13 PROCEDURE — A9150 MISC/EXPER NON-PRESCRIPT DRU: HCPCS | Performed by: EMERGENCY MEDICINE

## 2022-01-13 RX ORDER — PENICILLIN V POTASSIUM 500 MG/1
500 TABLET ORAL 2 TIMES DAILY
Qty: 20 TABLET | Refills: 0 | Status: SHIPPED | OUTPATIENT
Start: 2022-01-13 | End: 2022-01-23

## 2022-01-13 RX ORDER — ACETAMINOPHEN 500 MG
1000 TABLET ORAL ONCE
Status: COMPLETED | OUTPATIENT
Start: 2022-01-13 | End: 2022-01-13

## 2022-01-14 ENCOUNTER — TELEPHONE (OUTPATIENT)
Dept: FAMILY MEDICINE CLINIC | Facility: CLINIC | Age: 82
End: 2022-01-14

## 2022-01-14 ENCOUNTER — VIRTUAL PHONE E/M (OUTPATIENT)
Dept: FAMILY MEDICINE CLINIC | Facility: CLINIC | Age: 82
End: 2022-01-14
Payer: MEDICARE

## 2022-01-14 DIAGNOSIS — I48.21 PERMANENT ATRIAL FIBRILLATION (HCC): ICD-10-CM

## 2022-01-14 DIAGNOSIS — R50.9 FEVER, UNSPECIFIED FEVER CAUSE: Primary | ICD-10-CM

## 2022-01-14 DIAGNOSIS — U07.1 COVID-19: ICD-10-CM

## 2022-01-14 DIAGNOSIS — J02.0 ACUTE STREPTOCOCCAL PHARYNGITIS: ICD-10-CM

## 2022-01-14 PROBLEM — R07.9 CHEST PAIN OF UNCERTAIN ETIOLOGY: Status: RESOLVED | Noted: 2019-08-19 | Resolved: 2022-01-14

## 2022-01-14 PROCEDURE — 99214 OFFICE O/P EST MOD 30 MIN: CPT | Performed by: FAMILY MEDICINE

## 2022-01-14 NOTE — PROGRESS NOTES
This is a telemedicine visit with live, interactive video and audio. Patient understands and accepts financial responsibility for any deductible, co-insurance and/or co-pays associated with this service.     SUBJECTIVE  Patient nephew calls to follow up for 10 days. 20 tablet 0   • propylthiouracil 50 MG Oral Tab TAKE THREE TABLETS BY MOUTH THREE TIMES DAILY . Due for lab test. 270 tablet 2   • enalapril 20 MG Oral Tab Take 1 tablet (20 mg total) by mouth daily.  90 tablet 1   • furosemide 20 MG Oral Tab T

## 2022-01-14 NOTE — TELEPHONE ENCOUNTER
Patient son in law contacted, advised that we will not be able to arrange for antibody infusion, as [facility] has not received the mediation yet. Patient will be put on the nurse call back list to monitor progress.     Educated son in law on isolation and hand w

## 2022-01-14 NOTE — ED INITIAL ASSESSMENT (HPI)
Pt here w c/o sore throat, fever and runny nose x 2-3 days. States was feeling SOB earlier today. Hx of Afib on Xarelto.

## 2022-01-14 NOTE — ED PROVIDER NOTES
Patient Seen in: [facility]      History   Patient presents with:  Sore Throat  Fever    Stated Complaint: fever, sore throat, congestion    Subjective:   A  was used (Amharic).      Sandi Altamirano is a 79year old  male ac 2/15/16    RJM; significant anxiety in OR, was on Propofol Drip; suggest general anesthesia for OS    • ELECTROCARDIOGRAM, COMPLETE  07-    scanned to media tab, 07-                Social History    Tobacco Use      Smoking status: Never Smok of motion. No erythema, rigidity or tenderness. No pain with movement, spinous process tenderness or muscular tenderness. Normal range of motion. Lymphadenopathy:      Head:      Right side of head: Tonsillar adenopathy present.       Left side of head: T Call primary tomorrow. On xarelto, and atenolol. See's CORBIN Villatoro. Call tomorrow. No wheezing, stridor, or rhonchi. Rate controlled between 70 and 76  No sob cp or other complaints besides his sore throat.    He is not hypoxic, hypotension, tachyca

## 2022-01-15 NOTE — TELEPHONE ENCOUNTER
Home Monitoring Day 1 of 7. What  was your temp today? Better 91 was 102 2 days ago        What was your pulse ox today? Do not have    Are you feeling short of breath today?   no      Are you having a cough today? no    Is the cough better, the same, or

## 2022-01-17 NOTE — TELEPHONE ENCOUNTER
Home Monitoring Day 2 of 7. What  was your temp today? - 98    How did you take your temp? What was your pulse ox today?  n/a    Are you feeling short of breath today?    No      Is the shortness of breath better, the same, or worse than yesterday

## 2022-01-18 NOTE — TELEPHONE ENCOUNTER
Home Monitoring Day 3 of 7. What  was your temp today? - yes, 97.8    How did you take your temp?     with an oral thermometer    What was your pulse ox today?  n/a    Are you feeling short of breath today?    No      Is the shortness of breath better, t

## 2022-01-19 NOTE — TELEPHONE ENCOUNTER
Home Monitoring Day  4 of 7. Nursing notes:  Per patient's son in law. Patient is doing well. No fevers, shortness of breath, cough. Appetite is good without any Gi symptoms.  Advised his son in law that we will remove his father in law Awais Carrington off o

## 2022-04-06 RX ORDER — FUROSEMIDE 20 MG/1
40 TABLET ORAL DAILY
Qty: 180 TABLET | Refills: 1 | Status: SHIPPED | OUTPATIENT
Start: 2022-04-06 | End: 2022-08-29

## 2022-04-06 NOTE — TELEPHONE ENCOUNTER
Aminata Daily do you approve refill request?  Noted on last script that was given to pt on 1/12/22 #270 2 refills there is a comment \"Due for Lab\". Does pt need to have labs done?      Pt did have a video visit on 1/14/2022

## 2022-04-06 NOTE — TELEPHONE ENCOUNTER
Pt had a appt on 1/14/2022    Refill passed per [place] [facility] protocol.   Requested Prescriptions   Pending Prescriptions Disp Refills    FUROSEMIDE 20 MG Oral Tab [Pharmacy Med Name: Furosemide 20 Mg Tab Sol] 180 tablet 0     Sig: TAKE TWO TABLETS BY MOUTH DAILY        Hypertensive Medications Protocol Failed - 4/6/2022  1:48 PM        Failed - Appointment in past 6 or next 3 months        Passed - CMP or BMP in past 12 months        Passed - GFR Non- > 50     Lab Results   Component Value Date    GFRNAA 79 11/01/2021                       Recent Outpatient Visits              2 months ago Fever, unspecified fever cause    [facility], [place], [place],     [employer] E/M    4 months ago Permanent atrial fibrillation [facility])    [facility] Yadira Rojas MD    Office Visit    1 year ago Chronic atrial fibrillation [facility])    [facility]iology Yadira Rojas MD    Office Visit    1 year ago Hyperthyroidism    [facility], [place], [place], [place]    Office Visit    2 years ago Hyperthyroidism    [facility], [place], [place], [place]    Office Visit

## 2022-04-11 RX ORDER — PROPYLTHIOURACIL 50 MG/1
TABLET ORAL
Qty: 180 TABLET | Refills: 1 | Status: SHIPPED | OUTPATIENT
Start: 2022-04-11 | End: 2022-05-24

## 2022-04-11 NOTE — TELEPHONE ENCOUNTER
[employer] for insurance coverage of right L5 TFESI cpt codes [id number], [id number]. Insurance was verified and procedure is a covered benefit and does not require authorization. Will inform Nursing. To reception staff, pls call pt for appt. Also Cl[employer] message sent to pt   Rx faxed for a month supply. No future appointments.

## 2022-04-28 ENCOUNTER — OFFICE VISIT (OUTPATIENT)
Dept: FAMILY MEDICINE CLINIC | Facility: CLINIC | Age: 82
End: 2022-04-28
Payer: MEDICARE

## 2022-04-28 VITALS
SYSTOLIC BLOOD PRESSURE: 106 MMHG | HEART RATE: 58 BPM | DIASTOLIC BLOOD PRESSURE: 57 MMHG | HEIGHT: 67 IN | BODY MASS INDEX: 35.31 KG/M2 | WEIGHT: 225 LBS

## 2022-04-28 DIAGNOSIS — D69.6 THROMBOCYTOPENIA (HCC): ICD-10-CM

## 2022-04-28 DIAGNOSIS — I48.21 PERMANENT ATRIAL FIBRILLATION (HCC): ICD-10-CM

## 2022-04-28 DIAGNOSIS — E05.90 HYPERTHYROIDISM: ICD-10-CM

## 2022-04-28 DIAGNOSIS — R60.0 LOCALIZED EDEMA: ICD-10-CM

## 2022-04-28 DIAGNOSIS — E78.00 PURE HYPERCHOLESTEROLEMIA: Primary | ICD-10-CM

## 2022-04-28 DIAGNOSIS — G30.9 ALZHEIMER'S DISEASE, UNSPECIFIED (HCC): ICD-10-CM

## 2022-04-28 DIAGNOSIS — F02.80 ALZHEIMER'S DISEASE, UNSPECIFIED (HCC): ICD-10-CM

## 2022-04-28 DIAGNOSIS — Z00.00 ENCOUNTER FOR ANNUAL HEALTH EXAMINATION: ICD-10-CM

## 2022-04-28 DIAGNOSIS — H25.12 AGE-RELATED NUCLEAR CATARACT OF LEFT EYE: ICD-10-CM

## 2022-04-28 PROBLEM — IMO0002 ULCERATION: Status: RESOLVED | Noted: 2017-12-04 | Resolved: 2022-04-28

## 2022-04-28 PROBLEM — M79.606 LEG PAIN: Status: RESOLVED | Noted: 2017-12-04 | Resolved: 2022-04-28

## 2022-04-28 PROCEDURE — G0439 PPPS, SUBSEQ VISIT: HCPCS | Performed by: FAMILY MEDICINE

## 2022-04-28 PROCEDURE — G0009 ADMIN PNEUMOCOCCAL VACCINE: HCPCS | Performed by: FAMILY MEDICINE

## 2022-04-28 PROCEDURE — 90732 PPSV23 VACC 2 YRS+ SUBQ/IM: CPT | Performed by: FAMILY MEDICINE

## 2022-05-03 ENCOUNTER — LAB ENCOUNTER (OUTPATIENT)
Dept: LAB | Age: 82
End: 2022-05-03
Attending: FAMILY MEDICINE
Payer: MEDICARE

## 2022-05-03 DIAGNOSIS — E05.90 HYPERTHYROIDISM: ICD-10-CM

## 2022-05-03 LAB
ALBUMIN SERPL-MCNC: 3.5 G/DL (ref 3.4–5)
ALBUMIN/GLOB SERPL: 0.9 {RATIO} (ref 1–2)
ALP LIVER SERPL-CCNC: 73 U/L
ALT SERPL-CCNC: 17 U/L
ANION GAP SERPL CALC-SCNC: 4 MMOL/L (ref 0–18)
AST SERPL-CCNC: 17 U/L (ref 15–37)
BASOPHILS # BLD AUTO: 0.03 X10(3) UL (ref 0–0.2)
BASOPHILS NFR BLD AUTO: 0.6 %
BILIRUB SERPL-MCNC: 0.7 MG/DL (ref 0.1–2)
BUN BLD-MCNC: 27 MG/DL (ref 7–18)
BUN/CREAT SERPL: 25 (ref 10–20)
CALCIUM BLD-MCNC: 9.4 MG/DL (ref 8.5–10.1)
CHLORIDE SERPL-SCNC: 109 MMOL/L (ref 98–112)
CO2 SERPL-SCNC: 29 MMOL/L (ref 21–32)
CREAT BLD-MCNC: 1.08 MG/DL
DEPRECATED RDW RBC AUTO: 44.7 FL (ref 35.1–46.3)
EOSINOPHIL # BLD AUTO: 0.11 X10(3) UL (ref 0–0.7)
EOSINOPHIL NFR BLD AUTO: 2 %
ERYTHROCYTE [DISTWIDTH] IN BLOOD BY AUTOMATED COUNT: 13.8 % (ref 11–15)
FASTING STATUS PATIENT QL REPORTED: YES
GLOBULIN PLAS-MCNC: 3.8 G/DL (ref 2.8–4.4)
GLUCOSE BLD-MCNC: 104 MG/DL (ref 70–99)
HCT VFR BLD AUTO: 42.9 %
HGB BLD-MCNC: 13.9 G/DL
IMM GRANULOCYTES # BLD AUTO: 0.01 X10(3) UL (ref 0–1)
IMM GRANULOCYTES NFR BLD: 0.2 %
LYMPHOCYTES # BLD AUTO: 1.64 X10(3) UL (ref 1–4)
LYMPHOCYTES NFR BLD AUTO: 30.4 %
MCH RBC QN AUTO: 28.8 PG (ref 26–34)
MCHC RBC AUTO-ENTMCNC: 32.4 G/DL (ref 31–37)
MCV RBC AUTO: 89 FL
MONOCYTES # BLD AUTO: 0.61 X10(3) UL (ref 0.1–1)
MONOCYTES NFR BLD AUTO: 11.3 %
NEUTROPHILS # BLD AUTO: 3 X10 (3) UL (ref 1.5–7.7)
NEUTROPHILS # BLD AUTO: 3 X10(3) UL (ref 1.5–7.7)
NEUTROPHILS NFR BLD AUTO: 55.5 %
OSMOLALITY SERPL CALC.SUM OF ELEC: 299 MOSM/KG (ref 275–295)
PLATELET # BLD AUTO: 135 10(3)UL (ref 150–450)
POTASSIUM SERPL-SCNC: 4.1 MMOL/L (ref 3.5–5.1)
PROT SERPL-MCNC: 7.3 G/DL (ref 6.4–8.2)
RBC # BLD AUTO: 4.82 X10(6)UL
SODIUM SERPL-SCNC: 142 MMOL/L (ref 136–145)
T4 FREE SERPL-MCNC: 0.7 NG/DL (ref 0.8–1.7)
TSI SER-ACNC: 5.06 MIU/ML (ref 0.36–3.74)
WBC # BLD AUTO: 5.4 X10(3) UL (ref 4–11)

## 2022-05-03 PROCEDURE — 80053 COMPREHEN METABOLIC PANEL: CPT

## 2022-05-03 PROCEDURE — 36415 COLL VENOUS BLD VENIPUNCTURE: CPT

## 2022-05-03 PROCEDURE — 85025 COMPLETE CBC W/AUTO DIFF WBC: CPT

## 2022-05-03 PROCEDURE — 84443 ASSAY THYROID STIM HORMONE: CPT

## 2022-05-03 PROCEDURE — 84439 ASSAY OF FREE THYROXINE: CPT

## 2022-05-13 ENCOUNTER — HOSPITAL ENCOUNTER (OUTPATIENT)
Dept: ULTRASOUND IMAGING | Age: 82
Discharge: HOME | End: 2022-05-13
Attending: FAMILY MEDICINE
Payer: MEDICARE

## 2022-05-13 DIAGNOSIS — E05.90 HYPERTHYROIDISM: ICD-10-CM

## 2022-05-13 PROCEDURE — 76536 US EXAM OF HEAD AND NECK: CPT | Performed by: FAMILY MEDICINE

## 2022-05-17 DIAGNOSIS — E04.1 THYROID NODULE: Primary | ICD-10-CM

## 2022-05-24 RX ORDER — PROPYLTHIOURACIL 50 MG/1
TABLET ORAL
Qty: 270 TABLET | Refills: 2 | Status: SHIPPED | OUTPATIENT
Start: 2022-05-24

## 2022-05-24 NOTE — TELEPHONE ENCOUNTER
Refill passed per [address] [place] protocol. Requested Prescriptions   Pending Prescriptions Disp Refills    PROPYLTHIOURACIL 50 MG Oral Tab [Pharmacy Med Name: Propylthiouracil 50 Mg Tab Quag] 180 tablet 0     Sig: TAKE THREE TABLETS BY MOUTH THREE TIMES DAILY.  DUE FOR LAB TEST        Hyperthyroid Medication Protocol Passed - 5/24/2022  1:30 AM        Passed - TSH resulted in past 6 months        Passed - Appointment in past 6 or next 3 months              Recent Outpatient Visits              3 weeks ago Pure hypercholesterolemia    [address]    Office Visit    4 months ago Fever, unspecified fever cause    [address], [address] [facility]    [employer] E/M    5 months ago Permanent atrial fibrillation [facility])    [facility] Kay Cushing, MD    Office Visit    1 year ago Chronic atrial fibrillation [facility])    [facility] Kay Cushing, MD    Office Visit    1 year ago Hyperthyroidism    [address], [facility] [place]    Office Visit

## 2022-08-27 RX ORDER — PROPYLTHIOURACIL 50 MG/1
TABLET ORAL
Qty: 270 TABLET | Refills: 2 | Status: SHIPPED | OUTPATIENT
Start: 2022-08-27

## 2022-08-27 NOTE — TELEPHONE ENCOUNTER
Refill passed per [address] [place] protocol.   Requested Prescriptions   Pending Prescriptions Disp Refills    PROPYLTHIOURACIL 50 MG Oral Tab [Pharmacy Med Name: Propylthiouracil 50 Mg Tab Quag] 270 tablet 0     Sig: TAKE THREE TABLETS BY MOUTH THREE TIMES DAILY        Hyperthyroid Medication Protocol Passed - 8/27/2022  1:30 AM        Passed - TSH resulted in past 6 months        Passed - In person appointment or virtual visit in the past 6 mos or appointment in next 3 mos       Recent Outpatient Visits              4 months ago Pure hypercholesterolemia    [address], [address] [facility]    Office Visit    7 months ago Fever, unspecified fever cause    [address], [address] [facility]    [employer] E/M    8 months ago Permanent atrial fibrillation [facility])    [facility] Cardiology Carolyn Granados MD    Office Visit    1 year ago Chronic atrial fibrillation [facility])    [facility] Carolyn Granados MD    Office Visit    1 year ago Hyperthyroidism    [address], [address] [place], [place]    Office Visit                     Recent Outpatient Visits              4 months ago Pure hypercholesterolemia    [address], [address] [facility],     Office Visit    7 months ago Fever, unspecified fever cause    [address], [address] [facility]    [employer] E/M    8 months ago Permanent atrial fibrillation [facility])    [facility] Carolyn Granados MD    Office Visit    1 year ago Chronic atrial fibrillation [facility])    [facility] Cardiology Carolyn Granados MD    Office Visit    1 year ago Hyperthyroidism    [address], [address] [place], [place]    Office Visit

## 2022-08-29 RX ORDER — FUROSEMIDE 20 MG/1
40 TABLET ORAL DAILY
Qty: 180 TABLET | Refills: 1 | Status: SHIPPED | OUTPATIENT
Start: 2022-08-29 | End: 2023-03-01

## 2022-08-29 NOTE — TELEPHONE ENCOUNTER
Refill passed per [place] [facility] protocol.      Requested Prescriptions   Pending Prescriptions Disp Refills    FUROSEMIDE 20 MG Oral Tab [Pharmacy Med Name: Furosemide 20 Mg Tab Sol] 180 tablet 0     Sig: TAKE TWO TABLETS BY MOUTH DAILY        Hypertensive Medications Protocol Passed - 8/28/2022  9:55 PM        Passed - In person appointment in the past 12 or next 3 months       Recent Outpatient Visits              4 months ago Pure hypercholesterolemia    [facility], [address] [facility]    Office Visit    7 months ago Fever, unspecified fever cause    [facility], [address] [facility]    Virtual Phone E/M    8 months ago Permanent atrial fibrillation [facility])    [facility] [place] [facility] Rahat Vance MD    Office Visit    1 year ago Chronic atrial fibrillation [facility])    [facility] [place] [facility] Rahat Vance MD    Office Visit    2 years ago Hyperthyroidism    [facility], [address] [place], [place]    Office Visit                 Passed - Last BP reading less than 140/90     BP Readings from Last 1 Encounters:  04/28/22 : 106/57                Passed - CMP or BMP in past 6 months     Recent Results (from the past 4392 hour(s))   COMP METABOLIC PANEL (14)    Collection Time: 05/03/22  9:12 AM   Result Value Ref Range    Glucose 104 (H) 70 - 99 mg/dL    Sodium 142 136 - 145 mmol/L    Potassium 4.1 3.5 - 5.1 mmol/L    Chloride 109 98 - 112 mmol/L    CO2 29.0 21.0 - 32.0 mmol/L    Anion Gap 4 0 - 18 mmol/L    BUN 27 (H) 7 - 18 mg/dL    Creatinine 1.08 0.70 - 1.30 mg/dL    BUN/CREA Ratio 25.0 (H) 10.0 - 20.0    Calcium, Total 9.4 8.5 - 10.1 mg/dL    Calculated Osmolality 299 (H) 275 - 295 mOsm/kg    GFR, Non- 64 >=60    GFR, -American 74 >=60    ALT 17 16 - 61 U/L    AST 17 15 - 37 U/L    Alkaline Phosphatase 73 45 - 117 U/L    Bilirubin, Total 0.7 0.1 - 2.0 mg/dL    Total Protein 7.3 6.4 - 8.2 g/dL    Albumin 3.5 3.4 - 5.0 g/dL    Globulin  3.8 2.8 - 4.4 g/dL    A/G Ratio 0.9 (L) 1.0 - 2.0    Patient Fasting for CMP? Yes      *Note: Due to a large number of results and/or encounters for the requested time period, some results have not been displayed. A complete set of results can be found in Results Review.                  Passed - In person appointment or virtual visit in the past 6 months       Recent Outpatient Visits              4 months ago [facility]cholesterolemia    [facility], [address] [place] [address]    Office Visit    7 months ago Fever, unspecified fever cause    [facility], [address] [place] [address],     [employer] E/M    8 months ago Permanent atrial fibrillation [facility])    [facility] Carolyn Granados MD    Office Visit    1 year ago Chronic atrial fibrillation [facility])    [facility] Carolyn Granados MD    Office Visit    2 years ago Hyperthyroidism    [facility], [address] [place] [address]    Office Visit                 Passed - GFR > 50     No results found for: [facility]                    [unfilled]      [unfilled]

## 2022-09-23 ENCOUNTER — NURSE TRIAGE (OUTPATIENT)
Dept: FAMILY MEDICINE CLINIC | Facility: CLINIC | Age: 82
End: 2022-09-23

## 2022-09-23 ENCOUNTER — OFFICE VISIT (OUTPATIENT)
Dept: FAMILY MEDICINE CLINIC | Facility: CLINIC | Age: 82
End: 2022-09-23

## 2022-09-23 VITALS
WEIGHT: 222 LBS | BODY MASS INDEX: 34.84 KG/M2 | HEART RATE: 56 BPM | DIASTOLIC BLOOD PRESSURE: 52 MMHG | SYSTOLIC BLOOD PRESSURE: 95 MMHG | HEIGHT: 67 IN

## 2022-09-23 DIAGNOSIS — K64.4 EXTERNAL HEMORRHOID: ICD-10-CM

## 2022-09-23 DIAGNOSIS — K62.5 RECTAL BLEED: Primary | ICD-10-CM

## 2022-09-23 LAB
CUVETTE LOT #: NORMAL NUMERIC
HEMOGLOBIN: 13.1 G/DL (ref 13–17)

## 2022-09-23 PROCEDURE — 99213 OFFICE O/P EST LOW 20 MIN: CPT | Performed by: FAMILY MEDICINE

## 2022-09-23 PROCEDURE — 85018 HEMOGLOBIN: CPT | Performed by: FAMILY MEDICINE

## 2022-09-23 RX ORDER — HYDROCORTISONE 25 MG/G
1 CREAM TOPICAL DAILY PRN
Qty: 45 G | Refills: 1 | Status: SHIPPED | OUTPATIENT
Start: 2022-09-23

## 2022-09-23 NOTE — TELEPHONE ENCOUNTER
Action Requested: Summary for Provider     []  Critical Lab, Recommendations Needed  [] Need Additional Advice  []   FYI    []   Need Orders  [] Need Medications Sent to Pharmacy  []  Other     SUMMARY: OV scheduled today with Dr Amena Chaparro for rectal bleed. Pt son in law (on SYLVIA) calling to report pt has streaks of blood in stool and dizziness. Per son in law pt was constipated for several days last week and today had streaks of blood in stool  Pt has dizziness \"in the morning it is the worst then gets better\"    Per son in law, pt is not feeling faint, no black or tarry stools, no clots in stool or in toilet,     Denies SOB, having to hold on to objects to keep balance while walking, abd pain. Advised OV today per protocol. Pt prefers Dr Amena Chaparro. No openings. Dr Amena Chaparro contacted and ok to add pt today early afternoon. Pt son in law agrees to plan.   Pt added today at 1 pm.     Reason for call: Acute (rectal bleed)  Onset: Data Unavailable

## 2022-11-23 RX ORDER — PROPYLTHIOURACIL 50 MG/1
TABLET ORAL
Qty: 270 TABLET | Refills: 0 | Status: SHIPPED | OUTPATIENT
Start: 2022-11-23

## 2023-01-09 RX ORDER — HYDROCORTISONE 25 MG/G
1 CREAM TOPICAL
Qty: 45 G | Refills: 0 | Status: SHIPPED | OUTPATIENT
Start: 2023-01-09

## 2023-01-09 NOTE — TELEPHONE ENCOUNTER
Refill passed per [facility] protocol. Requested Prescriptions   Pending Prescriptions Disp Refills    PROCTO-MED HC 2.5 % External Cream [Pharmacy Med Name: Procto-Med Hc 2.5 % Cre Lead] 45 g 0     Sig: Place 1 Application rectally once daily as needed for Hemorrhoids.        Gastrointestional Medication Protocol Passed - 1/8/2023  9:14 AM        Passed - In person appointment or virtual visit in the past 12 mos or appointment in next 3 mos     Recent Outpatient Visits              3 months ago Rectal bleed    [facility] [place], [address], DO    Office Visit    8 months ago Pure hypercholesterolemia    [facility], [place] [address], [place]    Office Visit    12 months ago Fever, unspecified fever cause    [facility], [place], [address], DO    [employer] E/M    1 year ago Permanent atrial fibrillation [facility])    [facility] Surjit Mendez MD    Office Visit    2 years ago Chronic atrial fibrillation [facility])    [facility] Surjit Mendez MD    Office Visit                            Recent Outpatient Visits              3 months ago Rectal bleed    [facility], [place] [address], [place]    Office Visit    8 months ago Pure hypercholesterolemia    [facility], [place] [address], DO    Office Visit    12 months ago Fever, unspecified fever cause    [facility], [address], DO    Virtual Phone E/M    1 year ago Permanent atrial fibrillation [facility])    [facility] Cardiology Surjit Mendez MD    Office Visit    2 years ago Chronic atrial fibrillation [facility])    [facility] Cardiology Surjit Mendez MD    Office Visit

## 2023-03-01 RX ORDER — PROPYLTHIOURACIL 50 MG/1
TABLET ORAL
Qty: 270 TABLET | Refills: 0 | OUTPATIENT
Start: 2023-03-01

## 2023-03-01 NOTE — TELEPHONE ENCOUNTER
Orders extended and patient notified.   Please review refill failed/no protocol     Requested Prescriptions     Pending Prescriptions Disp Refills    furosemide 20 MG Oral Tab [Pharmacy Med Name: Furosemide 20 Mg Tab Solc] 180 tablet 3     Sig: Take 2 tablets (40 mg total) by mouth daily. Refused Prescriptions Disp Refills    PROPYLTHIOURACIL 50 MG Oral Tab [Pharmacy Med Name: Propylthiouracil 50 Mg Tab Quag] 270 tablet 0     Sig: TAKE THREE TABLETS BY MOUTH THREE TIMES DAILY     Refused By: Uzair Soliman     Reason for Refusal: Patient has requested refill too soon         Recent Visits  Date Type Provider Dept   09/23/22 Office Visit Samson Valentine DO [facility]   04/28/22 Office Visit Samson Valentine DO [facility]   Showing recent visits within past 540 days with a meds authorizing provider and meeting all other requirements  Future Appointments  No visits were found meeting these conditions. Showing future appointments within next 150 days with a meds authorizing provider and meeting all other requirements    Requested Prescriptions   Pending Prescriptions Disp Refills    furosemide 20 MG Oral Tab [Pharmacy Med Name: Furosemide 20 Mg Tab Solc] 180 tablet 3     Sig: Take 2 tablets (40 mg total) by mouth daily. Hypertensive Medications Protocol Failed - 3/1/2023  1:31 AM        Failed - CMP or BMP in past 6 months     No results found for this or any previous visit (from the past 4392 hour(s)).             Passed - In person appointment in the past 12 or next 3 months     Recent Outpatient Visits              5 months ago Rectal bleed    [address] [facility], [place], [place]    Office Visit    10 months ago Pure hypercholesterolemia    [address] [place], [place]    Office Visit    1 year ago Fever, unspecified fever cause    [facility], [address] [facility]     Virtual Phone E/M    1 year ago Permanent atrial fibrillation (HCC)    [address] Charlotte Walker MD    Office Visit    2 years ago Chronic atrial fibrillation [facility])    [address] Charlotte Walker MD    Office Visit                      Passed - Last BP reading less than 140/90     BP Readings from Last 1 Encounters:  09/23/22 : 95/52              Passed - In person appointment or virtual visit in the past 6 months     Recent Outpatient Visits              5 months ago Rectal bleed    [address] [facility], [address] [place] [address]    Office Visit    10 months ago Pure hypercholesterolemia    [address] [facility], [address] [place] [address]    Office Visit    1 year ago Fever, unspecified fever cause    [facility], [address] [place] [address],     Virtual Phone E/M    1 year ago Permanent atrial fibrillation [facility])    [address] Charlotte Walker MD    Office Visit    2 years ago Chronic atrial fibrillation [facility])    [address] Charlotte Walker MD    Office Visit                      Passed - EGFRCR or GFRNAA > 50     GFR Evaluation  GFRNAA: 64 , resulted on 5/3/2022           Refused Prescriptions Disp Refills    PROPYLTHIOURACIL 48 MG Oral Tab [Pharmacy Med Name: Propylthiouracil 50 Mg Tab Quag] 270 tablet 0     Sig: [address]       Hyperthyroid Medication Protocol Failed - 3/1/2023  1:31 AM        Failed - TSH resulted in past 6 months        Passed - In person appointment or virtual visit in the past 6 mos or appointment in next 3 mos     Recent Outpatient Visits              5 months ago [place]al bleed    [place]    Office Visit    10 months ago Pure hypercholesterolemia [place]    Office Visit    1 year ago Fever, unspecified fever cause    [facility], [address] [place], [place]     Virtual Phone E/M    1 year ago Permanent atrial fibrillation [facility])    Joslyn Johnson MD    Office Visit    2 years ago Chronic atrial fibrillation [facility])    Dandy Monet MD    Office Visit

## 2023-03-01 NOTE — TELEPHONE ENCOUNTER
Ladan Nieto from [facility] is calling for some clarification on the following medication and the reason why it was denied.     propylthiouracil 50 MG Oral Tab

## 2023-03-03 RX ORDER — FUROSEMIDE 20 MG/1
40 TABLET ORAL DAILY
Qty: 180 TABLET | Refills: 3 | Status: SHIPPED | OUTPATIENT
Start: 2023-03-03

## 2023-03-03 NOTE — TELEPHONE ENCOUNTER
I talked to the nephew who stated he is taking Propylthiouracil 50 mg  3 tablets  3 times a day. I clarified twice.

## 2023-03-07 RX ORDER — PROPYLTHIOURACIL 50 MG/1
TABLET ORAL
Qty: 270 TABLET | Refills: 1 | Status: SHIPPED | OUTPATIENT
Start: 2023-03-07

## 2023-05-09 ENCOUNTER — TELEPHONE (OUTPATIENT)
Dept: FAMILY MEDICINE CLINIC | Facility: CLINIC | Age: 83
End: 2023-05-09

## 2023-07-11 ENCOUNTER — OFFICE VISIT (OUTPATIENT)
Dept: FAMILY MEDICINE CLINIC | Facility: CLINIC | Age: 83
End: 2023-07-11

## 2023-07-11 VITALS
HEIGHT: 67 IN | BODY MASS INDEX: 34.94 KG/M2 | OXYGEN SATURATION: 96 % | SYSTOLIC BLOOD PRESSURE: 127 MMHG | RESPIRATION RATE: 18 BRPM | DIASTOLIC BLOOD PRESSURE: 75 MMHG | WEIGHT: 222.63 LBS | HEART RATE: 79 BPM

## 2023-07-11 DIAGNOSIS — M25.561 RIGHT KNEE PAIN, UNSPECIFIED CHRONICITY: Primary | ICD-10-CM

## 2023-07-11 PROCEDURE — 99213 OFFICE O/P EST LOW 20 MIN: CPT | Performed by: FAMILY MEDICINE

## 2023-07-11 RX ORDER — PROPYLTHIOURACIL 50 MG/1
TABLET ORAL
Qty: 50 TABLET | Refills: 0 | Status: SHIPPED | OUTPATIENT
Start: 2023-07-11

## 2023-07-11 NOTE — PROGRESS NOTES
Subjective:   Patient ID: Tee Shearer is a 79year old male. Medication Request  Patient with severe pain right knee   Has hard time walking   This has been ongoing for years   Also needs refill on ptu but seeing dr Marizol Price and needs blood test appears       History/Other:   Review of Systems  Constitutional: Negative. Negative for activity change, appetite change, diaphoresis and fatigue. Respiratory: Negative. Negative for apnea, cough, chest tightness and shortness of breath. Cardiovascular: Negative. Negative for chest pain, palpitations and leg swelling. Gastrointestinal: Negative. Negative for abdominal pain. Skin: Negative. MSK see hpi         Current Outpatient Medications   Medication Sig Dispense Refill    propylthiouracil 50 MG Oral Tab TAKE THREE TABLETS BY MOUTH THREE TIMES DAILY 50 tablet 0    hydrocortisone (PROCTO-MED HC) 2.5 % External Cream Place 1 Application rectally daily as needed for Hemorrhoids. 45 g 1    furosemide 20 MG Oral Tab Take 2 tablets (40 mg total) by mouth daily. 180 tablet 3    enalapril 20 MG Oral Tab Take 1 tablet (20 mg total) by mouth daily. 90 tablet 1    Rivaroxaban (XARELTO) 20 MG Oral Tab Take 1 tablet (20 mg total) by mouth daily with food. 30 tablet 0    Donepezil HCl 10 MG Oral Tab Take 1 tablet (10 mg total) by mouth every evening. 90 tablet 1    atorvastatin 10 MG Oral Tab Take 1 tablet (10 mg total) by mouth nightly. 90 tablet 3    atenolol 100 MG Oral Tab Take 1 tablet (100 mg total) by mouth daily. 90 tablet 3    risperiDONE 0.5 MG Oral Tab       MEMANTINE HCL 5 MG Oral Tab TAKE ONE TABLET BY MOUTH TWICE DAILY  (Patient not taking: Reported on 9/23/2022) 180 tablet 0     Allergies:No Known Allergies    Objective:   Physical Exam  Constitutional:       Appearance: He is well-developed. Cardiovascular:      Rate and Rhythm: Normal rate and regular rhythm. Heart sounds: Normal heart sounds.    Pulmonary:      Effort: Pulmonary effort is normal.      Breath sounds: Normal breath sounds. Musculoskeletal:         General: Swelling and tenderness present. Neurological:      Mental Status: He is alert. Deep Tendon Reflexes: Reflexes are normal and symmetric. Assessment & Plan:   Right knee pain, unspecified chronicity  (primary encounter diagnosis)  Appears as severe OA   X ray to be done and to see ortho   Also needs blood test ands see dr Natarajan Bound soon  No orders of the defined types were placed in this encounter.       Meds This Visit:  Requested Prescriptions     Signed Prescriptions Disp Refills    propylthiouracil 50 MG Oral Tab 50 tablet 0     Sig: TAKE THREE TABLETS BY MOUTH THREE TIMES DAILY       Imaging & Referrals:  ORTHOPEDIC - INTERNAL  XR KNEE (3 VIEWS), RIGHT (CPT=73562)

## 2023-07-13 ENCOUNTER — OFFICE VISIT (OUTPATIENT)
Dept: FAMILY MEDICINE CLINIC | Facility: CLINIC | Age: 83
End: 2023-07-13

## 2023-07-13 VITALS
HEIGHT: 69 IN | TEMPERATURE: 98 F | SYSTOLIC BLOOD PRESSURE: 109 MMHG | WEIGHT: 222 LBS | HEART RATE: 63 BPM | BODY MASS INDEX: 32.88 KG/M2 | OXYGEN SATURATION: 96 % | DIASTOLIC BLOOD PRESSURE: 63 MMHG

## 2023-07-13 DIAGNOSIS — E78.00 PURE HYPERCHOLESTEROLEMIA: ICD-10-CM

## 2023-07-13 DIAGNOSIS — M25.561 RIGHT KNEE PAIN, UNSPECIFIED CHRONICITY: Primary | ICD-10-CM

## 2023-07-13 DIAGNOSIS — E05.90 HYPERTHYROIDISM: ICD-10-CM

## 2023-07-13 PROCEDURE — 99214 OFFICE O/P EST MOD 30 MIN: CPT | Performed by: FAMILY MEDICINE

## 2023-07-13 RX ORDER — TRAMADOL HYDROCHLORIDE 50 MG/1
50 TABLET ORAL 3 TIMES DAILY
Qty: 90 TABLET | Refills: 0 | Status: SHIPPED | OUTPATIENT
Start: 2023-07-13

## 2023-07-13 NOTE — PROGRESS NOTES
Subjective:   Patient ID: Damon Gorman is a 79year old male. HPI  Patient presents for follow-up of right knee pain and hypothyroidism. He is overdue for laboratory testing overdue for his Medicare annual.  He has not had the x-ray obtained for his knee and has not taken any extra medication. No injury  History/Other:   Review of Systems   Constitutional: Negative. Musculoskeletal:  Positive for arthralgias. Right knee pain and swelling     Current Outpatient Medications   Medication Sig Dispense Refill    traMADol 50 MG Oral Tab Take 1 tablet (50 mg total) by mouth 3 (three) times daily. 90 tablet 0    propylthiouracil 50 MG Oral Tab TAKE THREE TABLETS BY MOUTH THREE TIMES DAILY 50 tablet 0    hydrocortisone (PROCTO-MED HC) 2.5 % External Cream Place 1 Application rectally daily as needed for Hemorrhoids. 45 g 1    furosemide 20 MG Oral Tab Take 2 tablets (40 mg total) by mouth daily. 180 tablet 3    enalapril 20 MG Oral Tab Take 1 tablet (20 mg total) by mouth daily. 90 tablet 1    Rivaroxaban (XARELTO) 20 MG Oral Tab Take 1 tablet (20 mg total) by mouth daily with food. 30 tablet 0    Donepezil HCl 10 MG Oral Tab Take 1 tablet (10 mg total) by mouth every evening. 90 tablet 1    atorvastatin 10 MG Oral Tab Take 1 tablet (10 mg total) by mouth nightly. 90 tablet 3    atenolol 100 MG Oral Tab Take 1 tablet (100 mg total) by mouth daily. 90 tablet 3    risperiDONE 0.5 MG Oral Tab       MEMANTINE HCL 5 MG Oral Tab TAKE ONE TABLET BY MOUTH TWICE DAILY  (Patient not taking: Reported on 7/13/2023) 180 tablet 0     Allergies:No Known Allergies    Objective:   Physical Exam  Vitals reviewed. Musculoskeletal:      Right knee: Swelling and effusion present. Decreased range of motion. Tenderness present.       Left knee: Normal.         Assessment & Plan:   Right knee pain, unspecified chronicity  (primary encounter diagnosis)  Hyperthyroidism  Pure hypercholesterolemia    Recommend knee x-ray and follow-up office visit at that time will evaluate for possible knee aspiration and steroid injection. In the meantime placed on tramadol avoiding anti-inflammatory medication due to his blood thinners. He is due for laboratory testing and follow-up Medicare annual visit  Orders Placed This Encounter      CBC With Differential With Platelet      Comp Metabolic Panel (14)      Lipid Panel      TSH and Free T4      Meds This Visit:  Requested Prescriptions     Signed Prescriptions Disp Refills    traMADol 50 MG Oral Tab 90 tablet 0     Sig: Take 1 tablet (50 mg total) by mouth 3 (three) times daily.        Imaging & Referrals:  None

## 2023-07-17 ENCOUNTER — HOSPITAL ENCOUNTER (OUTPATIENT)
Dept: GENERAL RADIOLOGY | Age: 83
Discharge: HOME | End: 2023-07-17
Attending: FAMILY MEDICINE
Payer: MEDICARE

## 2023-07-17 DIAGNOSIS — M25.561 RIGHT KNEE PAIN, UNSPECIFIED CHRONICITY: ICD-10-CM

## 2023-07-17 PROCEDURE — 73562 X-RAY EXAM OF KNEE 3: CPT | Performed by: FAMILY MEDICINE

## 2023-07-18 ENCOUNTER — LAB ENCOUNTER (OUTPATIENT)
Dept: LAB | Age: 83
End: 2023-07-18
Attending: FAMILY MEDICINE
Payer: MEDICARE

## 2023-07-18 ENCOUNTER — OFFICE VISIT (OUTPATIENT)
Dept: FAMILY MEDICINE CLINIC | Facility: CLINIC | Age: 83
End: 2023-07-18

## 2023-07-18 VITALS
DIASTOLIC BLOOD PRESSURE: 80 MMHG | HEIGHT: 68 IN | SYSTOLIC BLOOD PRESSURE: 132 MMHG | WEIGHT: 222 LBS | BODY MASS INDEX: 33.65 KG/M2 | OXYGEN SATURATION: 97 % | HEART RATE: 69 BPM | RESPIRATION RATE: 18 BRPM

## 2023-07-18 DIAGNOSIS — M17.11 PRIMARY OSTEOARTHRITIS OF RIGHT KNEE: ICD-10-CM

## 2023-07-18 DIAGNOSIS — E05.90 HYPERTHYROIDISM: ICD-10-CM

## 2023-07-18 DIAGNOSIS — M25.461 EFFUSION, RIGHT KNEE: Primary | ICD-10-CM

## 2023-07-18 DIAGNOSIS — E78.00 PURE HYPERCHOLESTEROLEMIA: ICD-10-CM

## 2023-07-18 LAB
ALBUMIN SERPL-MCNC: 3.6 G/DL (ref 3.4–5)
ALBUMIN/GLOB SERPL: 1 {RATIO} (ref 1–2)
ALP LIVER SERPL-CCNC: 52 U/L
ALT SERPL-CCNC: 15 U/L
ANION GAP SERPL CALC-SCNC: 8 MMOL/L (ref 0–18)
AST SERPL-CCNC: 18 U/L (ref 15–37)
BASOPHILS # BLD AUTO: 0.02 X10(3) UL (ref 0–0.2)
BASOPHILS NFR BLD AUTO: 0.4 %
BILIRUB SERPL-MCNC: 0.5 MG/DL (ref 0.1–2)
BUN BLD-MCNC: 31 MG/DL (ref 7–18)
BUN/CREAT SERPL: 28.2 (ref 10–20)
CALCIUM BLD-MCNC: 9.4 MG/DL (ref 8.5–10.1)
CHLORIDE SERPL-SCNC: 109 MMOL/L (ref 98–112)
CHOLEST SERPL-MCNC: 255 MG/DL (ref ?–200)
CO2 SERPL-SCNC: 28 MMOL/L (ref 21–32)
CREAT BLD-MCNC: 1.1 MG/DL
DEPRECATED RDW RBC AUTO: 45.2 FL (ref 35.1–46.3)
EOSINOPHIL # BLD AUTO: 0.06 X10(3) UL (ref 0–0.7)
EOSINOPHIL NFR BLD AUTO: 1.2 %
ERYTHROCYTE [DISTWIDTH] IN BLOOD BY AUTOMATED COUNT: 15.5 % (ref 11–15)
FASTING PATIENT LIPID ANSWER: YES
FASTING STATUS PATIENT QL REPORTED: YES
GFR SERPLBLD BASED ON 1.73 SQ M-ARVRAT: 67 ML/MIN/1.73M2 (ref 60–?)
GLOBULIN PLAS-MCNC: 3.7 G/DL (ref 2.8–4.4)
GLUCOSE BLD-MCNC: 106 MG/DL (ref 70–99)
HCT VFR BLD AUTO: 37.3 %
HDLC SERPL-MCNC: 46 MG/DL (ref 40–59)
HGB BLD-MCNC: 11.7 G/DL
IMM GRANULOCYTES # BLD AUTO: 0 X10(3) UL (ref 0–1)
IMM GRANULOCYTES NFR BLD: 0 %
LDLC SERPL CALC-MCNC: 191 MG/DL (ref ?–100)
LYMPHOCYTES # BLD AUTO: 1.86 X10(3) UL (ref 1–4)
LYMPHOCYTES NFR BLD AUTO: 36.4 %
MCH RBC QN AUTO: 25.4 PG (ref 26–34)
MCHC RBC AUTO-ENTMCNC: 31.4 G/DL (ref 31–37)
MCV RBC AUTO: 81.1 FL
MONOCYTES # BLD AUTO: 0.51 X10(3) UL (ref 0.1–1)
MONOCYTES NFR BLD AUTO: 10 %
NEUTROPHILS # BLD AUTO: 2.66 X10 (3) UL (ref 1.5–7.7)
NEUTROPHILS # BLD AUTO: 2.66 X10(3) UL (ref 1.5–7.7)
NEUTROPHILS NFR BLD AUTO: 52 %
NONHDLC SERPL-MCNC: 209 MG/DL (ref ?–130)
OSMOLALITY SERPL CALC.SUM OF ELEC: 307 MOSM/KG (ref 275–295)
PLATELET # BLD AUTO: 172 10(3)UL (ref 150–450)
POTASSIUM SERPL-SCNC: 4.6 MMOL/L (ref 3.5–5.1)
PROT SERPL-MCNC: 7.3 G/DL (ref 6.4–8.2)
RBC # BLD AUTO: 4.6 X10(6)UL
SODIUM SERPL-SCNC: 145 MMOL/L (ref 136–145)
T4 FREE SERPL-MCNC: 0.4 NG/DL (ref 0.8–1.7)
TRIGL SERPL-MCNC: 102 MG/DL (ref 30–149)
TSI SER-ACNC: 18.5 MIU/ML (ref 0.36–3.74)
VLDLC SERPL CALC-MCNC: 21 MG/DL (ref 0–30)
WBC # BLD AUTO: 5.1 X10(3) UL (ref 4–11)

## 2023-07-18 PROCEDURE — 80061 LIPID PANEL: CPT

## 2023-07-18 PROCEDURE — 80053 COMPREHEN METABOLIC PANEL: CPT

## 2023-07-18 PROCEDURE — 36415 COLL VENOUS BLD VENIPUNCTURE: CPT

## 2023-07-18 PROCEDURE — 84439 ASSAY OF FREE THYROXINE: CPT

## 2023-07-18 PROCEDURE — 85025 COMPLETE CBC W/AUTO DIFF WBC: CPT

## 2023-07-18 PROCEDURE — 84443 ASSAY THYROID STIM HORMONE: CPT

## 2023-07-18 RX ORDER — TRIAMCINOLONE ACETONIDE 40 MG/ML
40 INJECTION, SUSPENSION INTRA-ARTICULAR; INTRAMUSCULAR ONCE
Status: COMPLETED | OUTPATIENT
Start: 2023-07-18 | End: 2023-07-18

## 2023-07-18 RX ORDER — TRIAMCINOLONE ACETONIDE 40 MG/ML
40 INJECTION, SUSPENSION INTRA-ARTICULAR; INTRAMUSCULAR ONCE
Status: SHIPPED | OUTPATIENT
Start: 2023-07-18

## 2023-07-18 RX ADMIN — TRIAMCINOLONE ACETONIDE 40 MG: 40 INJECTION, SUSPENSION INTRA-ARTICULAR; INTRAMUSCULAR at 10:41:00

## 2023-07-18 NOTE — PROGRESS NOTES
Subjective:   Patient ID: Matthew De León is a 79year old male. HPI  Right knee joint swelling. XR done yesterday. History/Other:   Review of Systems   Musculoskeletal:  Positive for arthralgias. Right knee pain     Current Outpatient Medications   Medication Sig Dispense Refill    traMADol 50 MG Oral Tab Take 1 tablet (50 mg total) by mouth 3 (three) times daily. 90 tablet 0    propylthiouracil 50 MG Oral Tab TAKE THREE TABLETS BY MOUTH THREE TIMES DAILY 50 tablet 0    hydrocortisone (PROCTO-MED HC) 2.5 % External Cream Place 1 Application rectally daily as needed for Hemorrhoids. 45 g 1    furosemide 20 MG Oral Tab Take 2 tablets (40 mg total) by mouth daily. 180 tablet 3    enalapril 20 MG Oral Tab Take 1 tablet (20 mg total) by mouth daily. 90 tablet 1    Rivaroxaban (XARELTO) 20 MG Oral Tab Take 1 tablet (20 mg total) by mouth daily with food. 30 tablet 0    Donepezil HCl 10 MG Oral Tab Take 1 tablet (10 mg total) by mouth every evening. 90 tablet 1    atorvastatin 10 MG Oral Tab Take 1 tablet (10 mg total) by mouth nightly. 90 tablet 3    atenolol 100 MG Oral Tab Take 1 tablet (100 mg total) by mouth daily. 90 tablet 3    risperiDONE 0.5 MG Oral Tab       MEMANTINE HCL 5 MG Oral Tab TAKE ONE TABLET BY MOUTH TWICE DAILY  (Patient not taking: Reported on 7/13/2023) 180 tablet 0     Allergies:No Known Allergies    Objective:   Physical Exam  Vitals reviewed. Musculoskeletal:      Right knee: Effusion present. Decreased range of motion. Tenderness present. After consent right knee joint effusion aspirated and injected joint with 40mg Kenalog. Tolerated well. Assessment & Plan:   Effusion, right knee  (primary encounter diagnosis)  Primary osteoarthritis of right knee    Ice daily for two days. PT if not improving enough. Ortho after that.     Orders Placed This Encounter      arthrocentesis major joint      Meds This Visit:  Requested Prescriptions     Pending Prescriptions Disp Refills triamcinolone acetonide (Kenalog-40) 40 MG/ML injection 40 mg         Imaging & Referrals:  None

## 2023-07-18 NOTE — PROCEDURES
After consent right knee joint effusion aspirated and injected joint with 40mg Kenalog. Tolerated well.

## 2023-07-26 DIAGNOSIS — E05.90 HYPERTHYROIDISM: Primary | ICD-10-CM

## 2023-07-28 RX ORDER — PROPYLTHIOURACIL 50 MG/1
TABLET ORAL
Qty: 50 TABLET | Refills: 0 | COMMUNITY
Start: 2023-07-28

## 2023-08-03 NOTE — TELEPHONE ENCOUNTER
Spoke with pt's son in law per JOJO WARD verified. He stated pt had labs done for thyroid and had ov with Dr Alex Gonzales on 23  Pt was instructed to take propylthiorucal 50 mg 2 tabs TID (total 6 pills a day), the ref we sent to [address] [place] [address] the qty is not enough and to update direction. .   Pt should get qty 540 pills  Pt used to take 8 pills a day. Pt will be out of meds by tomorrow. pls advise, thanks in advance. Pls send to [facility]. Routed to RN triage to run for protocol , thanks.           Requested Prescriptions     Pending Prescriptions Disp Refills    propylthiouracil 50 MG Oral Tab 540 tablet 3     Sig: TAKE TWO TABLETS BY MOUTH THREE TIMES DAILY       Last Office Visit with PCP: 2023

## 2023-08-04 NOTE — TELEPHONE ENCOUNTER
Routing to Raiza ALANIZ for assist.  Dr Dyan Majano unavailable. Please see recent TSH results and message below and advised to qty change.

## 2023-08-05 RX ORDER — PROPYLTHIOURACIL 50 MG/1
TABLET ORAL
Qty: 540 TABLET | Refills: 3 | OUTPATIENT
Start: 2023-08-05

## 2023-08-10 NOTE — TELEPHONE ENCOUNTER
See below message, thanks       Requested Prescriptions     Refused Prescriptions Disp Refills    propylthiouracil 50 MG Oral Tab 540 tablet 3     Sig: TAKE TWO TABLETS BY MOUTH THREE TIMES DAILY     Refused By: Jaime Morris     Reason for Refusal: Duplicate refill request       Last Office Visit with PCP: 7/18/2023

## 2023-08-12 RX ORDER — PROPYLTHIOURACIL 50 MG/1
100 TABLET ORAL 3 TIMES DAILY
Qty: 540 TABLET | Refills: 0 | Status: SHIPPED | OUTPATIENT
Start: 2023-08-12

## 2023-08-12 NOTE — TELEPHONE ENCOUNTER
Script sent to pharmacy.    Patient should get thyroid test rechecked in two months per Dr. Regulo Cody

## 2023-08-14 NOTE — TELEPHONE ENCOUNTER
Spoke to patient's son-in-law Paulino Hinds (on [employer]), verified Name and . JIN Gill's message below. Verbalized understanding and had no further questions at this time.

## 2023-09-19 ENCOUNTER — LAB ENCOUNTER (OUTPATIENT)
Dept: LAB | Age: 83
End: 2023-09-19
Attending: FAMILY MEDICINE
Payer: MEDICARE

## 2023-09-19 DIAGNOSIS — E05.90 HYPERTHYROIDISM: ICD-10-CM

## 2023-09-19 LAB
T4 FREE SERPL-MCNC: 0.4 NG/DL (ref 0.8–1.7)
TSI SER-ACNC: 21.8 MIU/ML (ref 0.36–3.74)

## 2023-09-19 PROCEDURE — 84439 ASSAY OF FREE THYROXINE: CPT

## 2023-09-19 PROCEDURE — 84443 ASSAY THYROID STIM HORMONE: CPT

## 2023-09-19 PROCEDURE — 36415 COLL VENOUS BLD VENIPUNCTURE: CPT

## 2023-10-03 DIAGNOSIS — E05.90 HYPERTHYROIDISM: Primary | ICD-10-CM

## 2023-11-08 ENCOUNTER — TELEPHONE (OUTPATIENT)
Dept: CASE MANAGEMENT | Age: 83
End: 2023-11-08

## 2023-11-08 ENCOUNTER — TELEPHONE (OUTPATIENT)
Dept: FAMILY MEDICINE CLINIC | Facility: CLINIC | Age: 83
End: 2023-11-08

## 2023-11-08 DIAGNOSIS — H26.9 CATARACT, UNSPECIFIED CATARACT TYPE, UNSPECIFIED LATERALITY: Primary | ICD-10-CM

## 2023-11-08 NOTE — TELEPHONE ENCOUNTER
Dr. Caprice Arroyo,     Friend requested a referral for patient to consult with specialist for future cataract surgery. Please provide the name of the specialist you recommend. Pended referral please review diagnosis and sign off if you agree. Thank you.   Rafael Biggs

## 2023-11-08 NOTE — TELEPHONE ENCOUNTER
Patient 's friend Miah is requesting referral.     Name of specialist and specialty department : cataract surgeon  Reason for visit with the specialist: cataract removal  Address of the specialist office:  Appointment date:      Send a mychart when ready    CSS informed patient the turnaround time for referral is 5-7 business days.  Patient was informed to check their MyChart account for referral status.

## 2023-12-13 RX ORDER — PROPYLTHIOURACIL 50 MG/1
100 TABLET ORAL 3 TIMES DAILY
Qty: 540 TABLET | Refills: 1 | Status: SHIPPED | OUTPATIENT
Start: 2023-12-13

## 2023-12-13 NOTE — TELEPHONE ENCOUNTER
Refill passed per [place] [employer] [place] protocol. Requested Prescriptions   Pending Prescriptions Disp Refills    PROPYLTHIOURACIL 50 MG Oral Tab [Pharmacy Med Name: Propylthiouracil 50 Mg Tab Quag] 540 tablet 0     Sig: TAKE TWO TABLETS BY MOUTH THREE TIMES DAILY       Hyperthyroid Medication Protocol Passed - 12/12/2023  9:31 AM        Passed - TSH resulted in past 6 months        Passed - In person appointment or virtual visit in the past 6 mos or appointment in next 3 mos     Recent Outpatient Visits              4 months ago Effusion, right knee    [facility] [place], [place]    Office Visit    5 months ago [facility] pain, unspecified chronicity    [facility] [place] [place]    Office Visit    5 months ago Right knee pain, unspecified chronicity    Boo Lord MD    Office Visit    1 year ago Rectal bleed    Bern[place]ine Ferreira DO    Office Visit    1 year ago [facility] [place] [place]    Office Visit          Future Appointments         Provider Department Appt Notes    [facility]   TE sent; Needs Referral to eye surgeon for cataract removal  Tasi/friend schedule # [phone]                  Future Appointments         Provider Department Appt Notes    [facility]- last was 4-28-22   TE sent; Needs Referral to eye surgeon for cataract removal  Tasi/friend schedule # [phone]          Recent Outpatient Visits              4 months ago Effusion, right knee    [facility] [place] [facility]    Office Visit    5 months [facility], [facility] [place], [place]    Office Visit    5 months ago Right knee pain, unspecified chronicity    Latisha Henriquez MD    Office Visit    1 year ago [facility]     Office Visit    1 year ago Pure hypercholesterolemia    [address] [facility], [address] [place], [place]    Office Visit

## 2023-12-14 ENCOUNTER — LAB ENCOUNTER (OUTPATIENT)
Dept: LAB | Age: 83
End: 2023-12-14
Attending: FAMILY MEDICINE
Payer: MEDICARE

## 2023-12-14 DIAGNOSIS — E05.90 HYPERTHYROIDISM: ICD-10-CM

## 2023-12-14 LAB
T4 FREE SERPL-MCNC: 1 NG/DL (ref 0.8–1.7)
TSI SER-ACNC: 3.68 MIU/ML (ref 0.55–4.78)

## 2023-12-14 PROCEDURE — 84443 ASSAY THYROID STIM HORMONE: CPT

## 2023-12-14 PROCEDURE — 36415 COLL VENOUS BLD VENIPUNCTURE: CPT

## 2023-12-14 PROCEDURE — 84439 ASSAY OF FREE THYROXINE: CPT

## 2024-01-02 ENCOUNTER — OFFICE VISIT (OUTPATIENT)
Dept: ORTHOPEDICS CLINIC | Facility: CLINIC | Age: 84
End: 2024-01-02

## 2024-01-02 VITALS
WEIGHT: 222 LBS | BODY MASS INDEX: 33.65 KG/M2 | DIASTOLIC BLOOD PRESSURE: 76 MMHG | SYSTOLIC BLOOD PRESSURE: 135 MMHG | HEIGHT: 68 IN | HEART RATE: 80 BPM

## 2024-01-02 DIAGNOSIS — M17.11 PRIMARY OSTEOARTHRITIS OF RIGHT KNEE: Primary | ICD-10-CM

## 2024-01-02 DIAGNOSIS — E66.09 CLASS 1 OBESITY DUE TO EXCESS CALORIES WITH SERIOUS COMORBIDITY AND BODY MASS INDEX (BMI) OF 33.0 TO 33.9 IN ADULT: ICD-10-CM

## 2024-01-02 PROCEDURE — 20610 DRAIN/INJ JOINT/BURSA W/O US: CPT | Performed by: ORTHOPAEDIC SURGERY

## 2024-01-02 PROCEDURE — 99204 OFFICE O/P NEW MOD 45 MIN: CPT | Performed by: ORTHOPAEDIC SURGERY

## 2024-01-02 RX ORDER — TRIAMCINOLONE ACETONIDE 40 MG/ML
40 INJECTION, SUSPENSION INTRA-ARTICULAR; INTRAMUSCULAR ONCE
Status: COMPLETED | OUTPATIENT
Start: 2024-01-02 | End: 2024-01-02

## 2024-01-02 RX ADMIN — TRIAMCINOLONE ACETONIDE 40 MG: 40 INJECTION, SUSPENSION INTRA-ARTICULAR; INTRAMUSCULAR at 17:52:00

## 2024-01-02 NOTE — PROGRESS NOTES
Per verbal order from Dr. Ellington, draw up 5ml of 0.5% Marcaine and 1ml of Kenalog 40 for cortisone injection to right knee. Oralia BRADLEY MA  Patient provided education handout for cortisone injection.

## 2024-01-02 NOTE — H&P
NURSING INTAKE COMMENTS:   Chief Complaint   Patient presents with    Knee Pain     Right - here with the son-in-law who translates for him - onset years ago - no injury - he had a cortisone inj done by PCP in 7/2023 - states the injection helped and the pain was less - now pain is back and is rated as 5-8/10 with weight bearing and walking - no pain at rest - sometimes the knee gives out        HPI: This 83 year old male presents today with his son who translated.  He has bone-on-bone arthritis of the right knee.  He walks with a cane.  He has not injured his knee replacement.  He did have a cortisone injection in July 2023 by another doctor.  It helped somewhat.    He is retired.  He lives in a house with stairs with his son's family.  He does not drive.  He uses a cane which was present today.  During the summer apparently he bikes often.  In the winter he is more sedentary.  He is on Xarelto for atrial fibrillation.  BMI is 33.75.  He denies diabetes.    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    Atrial fibrillation (HCC)     Essential hypertension 12/14/2015    Hypertension 12/14/2015    Hyperthyroidism     Meibomian gland dysfunction (MGD) of upper and lower lids of both eyes 12/22/2015    Neck mass     left-FNA    Unspecified essential hypertension      Past Surgical History:   Procedure Laterality Date    CATARACT EXTRACTION W/  INTRAOCULAR LENS IMPLANT Right 2/15/16    RJM; significant anxiety in OR, was on Propofol Drip; suggest general anesthesia for OS     ELECTROCARDIOGRAM, COMPLETE  07-    scanned to media tab, 07-     Current Outpatient Medications   Medication Sig Dispense Refill    propylthiouracil 50 MG Oral Tab Take 2 tablets (100 mg total) by mouth 3 (three) times daily. 540 tablet 1    hydrocortisone (PROCTO-MED HC) 2.5 % External Cream Place 1 Application rectally daily as needed for  Hemorrhoids. 45 g 1    furosemide 20 MG Oral Tab Take 2 tablets (40 mg total) by mouth daily. 180 tablet 3    enalapril 20 MG Oral Tab Take 1 tablet (20 mg total) by mouth daily. 90 tablet 1    Rivaroxaban (XARELTO) 20 MG Oral Tab Take 1 tablet (20 mg total) by mouth daily with food. 30 tablet 0    atorvastatin 10 MG Oral Tab Take 1 tablet (10 mg total) by mouth nightly. 90 tablet 3    atenolol 100 MG Oral Tab Take 1 tablet (100 mg total) by mouth daily. 90 tablet 3    risperiDONE 0.5 MG Oral Tab        No Known Allergies  Family History   Problem Relation Age of Onset    Cancer Father         lung    Diabetes Neg     Glaucoma Neg     Macular degeneration Neg      No family Hx of DVT/PE    Social History     Occupational History    Not on file   Tobacco Use    Smoking status: Never    Smokeless tobacco: Never   Vaping Use    Vaping Use: Never used   Substance and Sexual Activity    Alcohol use: No    Drug use: No    Sexual activity: Not on file        Review of Systems:  GENERAL: feels generally well, no significant weight loss or weight gain  SKIN: no ulcerated or worrisome skin lesions  EYES:denies blurred vision or double vision  HEENT: denies new nasal congestion, sinus pain or ST  LUNGS: denies shortness of breath  CARDIOVASCULAR: denies chest pain  GI: no hematemesis, no worsening heartburn, no diarrhea  : no dysuria, no blood in urine, no difficulty urinating, no incontinence  MUSCULOSKELETAL: no other musculoskeletal complaints other than in HPI  NEURO: no numbness or tingling, no weakness or balance disorder  PSYCHE: no depression or anxiety  HEMATOLOGIC: no hx of blood dyscrasia, no Hx DVT/PE  ENDOCRINE: no thyroid or diabetes issues  ALL/ASTHMA: no new hx of severe allergy or asthma    Physical Examination:    Ht 5' 8\" (1.727 m)   Wt 222 lb (100.7 kg)   BMI 33.75 kg/m²   Constitutional: appears well hydrated, alert and responsive, no acute distress noted  Extremities: The skin on the legs was healthy.   Trace pitting edema but no calf tenderness or vascular issues.  The knees had no effusion or asymmetric warmth.  Musculoskeletal: Motion of the right knee was 5 to 105 degrees.  No instability.  Mostly medial joint line pain and some patellofemoral pain with little crepitus.  Neurological: Normal motor and sensory to command distally.    Imaging: X-rays show bone-on-bone osteoarthritis of the right knee.      No results found.     Lab Results   Component Value Date    WBC 5.1 07/18/2023    HGB 11.7 (L) 07/18/2023    .0 07/18/2023      Lab Results   Component Value Date     (H) 07/18/2023    BUN 31 (H) 07/18/2023    CREATSERUM 1.10 07/18/2023    GFRNAA 64 05/03/2022    GFRAA 74 05/03/2022        Assessment and Plan:  Diagnoses and all orders for this visit:    Primary osteoarthritis of right knee  -     Arthrocentesis aspiration and injection major Right joint bursa w/o US  -     triamcinolone acetonide (Kenalog-40) 40 MG/ML injection 40 mg    Class 1 obesity due to excess calories with serious comorbidity and body mass index (BMI) of 33.0 to 33.9 in adult        Assessment: Above diagnoses.  The patient and son do not feel he wants knee replacement at this point in his life.    Plan: I recommended cortisone injection which they agreed to.  Aspiration was negative and he tolerated the injection of bupivacaine 0.5% 5 cc and Kenalog 1 cc without issue.    We discussed the possibility of hyaluronic acid injections and again I told him not always right off knee replacement simply because of his age.  While the son said he has some early dementia, he seemed to follow all my questions and instructions without issue.  If he desires hyaluronic acid injection, I recommended they call ahead of time, not simply make an appointment.  For now I will see him as needed.    Follow Up: No follow-ups on file.    Jose Angel Ellington MD

## 2024-02-02 ENCOUNTER — MED REC SCAN ONLY (OUTPATIENT)
Dept: FAMILY MEDICINE CLINIC | Facility: CLINIC | Age: 84
End: 2024-02-02

## 2024-03-05 RX ORDER — FUROSEMIDE 20 MG/1
40 TABLET ORAL DAILY
Qty: 180 TABLET | Refills: 3 | Status: SHIPPED | OUTPATIENT
Start: 2024-03-05

## 2024-03-05 NOTE — TELEPHONE ENCOUNTER
Tasi, patient's relative, called to request a refill on medication below:     Tasi states pharmacy called to request it about 2 weeks ago, however, there is no record of request. Patient is out of medication. [place] on file verified.       Medication Quantity Refills Start End   furosemide 20 MG Oral Tab 180 tablet 3 3/3/2023 --   Sig:   Take 2 tablets (40 mg total) by mouth daily.     Route:   Oral

## 2024-03-05 NOTE — TELEPHONE ENCOUNTER
Refill passed per [facility] protocol.    Requested Prescriptions   Pending Prescriptions Disp Refills    furosemide 20 MG Oral Tab 180 tablet 3     Sig: Take 2 tablets (40 mg total) by mouth daily.       Hypertension Medications Protocol Passed - 3/5/2024 10:28 AM        Passed - CMP or BMP in past 12 months        Passed - Last BP reading less than 140/90     BP Readings from Last 1 Encounters:   03/05/24 127/62               Passed - In person appointment or virtual visit in the past 12 mos or appointment in next 3 mos     Recent Outpatient Visits              2 months ago Primary osteoarthritis of right knee    [facility]    Office Visit    2 months ago Alzheimer's disease, unspecified (HCC)    [facility] [facility]     [facility]    7 months ago Effusion, right knee    [facility] [place]     Office Visit    7 months ago Right knee pain, unspecified chronicity    [facility]     [facility]    7 months ago Right [facility], [facility] chronicity    [facility]    [facility]                      Passed - EGFRCR or GFRNAA > 50     GFR Evaluation  EGFRCR: 67 , resulted on 7/18/2023

## 2024-03-18 ENCOUNTER — LAB ENCOUNTER (OUTPATIENT)
Dept: LAB | Facility: HOSPITAL | Age: 84
End: 2024-03-18
Attending: STUDENT IN AN ORGANIZED HEALTH CARE EDUCATION/TRAINING PROGRAM
Payer: MEDICARE

## 2024-03-18 DIAGNOSIS — Z86.39 H/O HYPERLIPIDEMIA: Primary | ICD-10-CM

## 2024-03-18 DIAGNOSIS — Z86.39 PERSONAL HISTORY OF NUTRITIONAL DEFICIENCY: ICD-10-CM

## 2024-03-18 LAB
CHOLEST SERPL-MCNC: 149 MG/DL (ref ?–200)
FASTING PATIENT LIPID ANSWER: NO
HDLC SERPL-MCNC: 49 MG/DL (ref 40–59)
LDLC SERPL CALC-MCNC: 87 MG/DL (ref ?–100)
NONHDLC SERPL-MCNC: 100 MG/DL (ref ?–130)
TRIGL SERPL-MCNC: 66 MG/DL (ref 30–149)
VLDLC SERPL CALC-MCNC: 11 MG/DL (ref 0–30)

## 2024-03-18 PROCEDURE — 36415 COLL VENOUS BLD VENIPUNCTURE: CPT

## 2024-03-18 PROCEDURE — 80061 LIPID PANEL: CPT

## 2024-04-07 ENCOUNTER — PATIENT MESSAGE (OUTPATIENT)
Dept: FAMILY MEDICINE CLINIC | Facility: CLINIC | Age: 84
End: 2024-04-07

## 2024-05-09 ENCOUNTER — OFFICE VISIT (OUTPATIENT)
Dept: FAMILY MEDICINE CLINIC | Facility: CLINIC | Age: 84
End: 2024-05-09

## 2024-05-09 VITALS
HEART RATE: 87 BPM | SYSTOLIC BLOOD PRESSURE: 118 MMHG | HEIGHT: 68 IN | WEIGHT: 221 LBS | BODY MASS INDEX: 33.49 KG/M2 | RESPIRATION RATE: 17 BRPM | OXYGEN SATURATION: 98 % | DIASTOLIC BLOOD PRESSURE: 62 MMHG

## 2024-05-09 DIAGNOSIS — R60.0 LOCALIZED EDEMA: Primary | ICD-10-CM

## 2024-05-09 DIAGNOSIS — K64.9 HEMORRHOIDS, UNSPECIFIED HEMORRHOID TYPE: ICD-10-CM

## 2024-05-09 PROCEDURE — 99214 OFFICE O/P EST MOD 30 MIN: CPT | Performed by: FAMILY MEDICINE

## 2024-05-09 PROCEDURE — G2211 COMPLEX E/M VISIT ADD ON: HCPCS | Performed by: FAMILY MEDICINE

## 2024-05-09 RX ORDER — HYDROCORTISONE 25 MG/G
1 CREAM TOPICAL DAILY PRN
Qty: 45 G | Refills: 1 | Status: SHIPPED | OUTPATIENT
Start: 2024-05-09

## 2024-05-09 RX ORDER — ROSUVASTATIN CALCIUM 10 MG/1
TABLET, COATED ORAL
COMMUNITY
Start: 2024-01-08

## 2024-05-09 NOTE — PROGRESS NOTES
Subjective:   Itzel De Leon is a 83 year old male who presents for Anal / Rectal Problem and Edema (B/L foot. )       History/Other:    Chief Complaint Reviewed and Verified  Nursing Notes Reviewed and   Verified  Tobacco Reviewed  Allergies Reviewed  Problem List Reviewed    Medical History Reviewed  Surgical History Reviewed  Family History   Reviewed  Social History Reviewed         Tobacco:  He has never smoked tobacco.    Current Outpatient Medications   Medication Sig Dispense Refill    rosuvastatin 10 MG Oral Tab rosuvastatin 10 mg tablet, [RxNorm: 751504]      hydrocortisone (PROCTO-MED HC) 2.5 % External Cream Place 1 Application rectally daily as needed for Hemorrhoids. 45 g 1    furosemide 20 MG Oral Tab Take 2 tablets (40 mg total) by mouth daily. 180 tablet 3    propylthiouracil 50 MG Oral Tab Take 2 tablets (100 mg total) by mouth 3 (three) times daily. 540 tablet 1    enalapril 20 MG Oral Tab Take 1 tablet (20 mg total) by mouth daily. 90 tablet 1    Rivaroxaban (XARELTO) 20 MG Oral Tab Take 1 tablet (20 mg total) by mouth daily with food. 30 tablet 0    atenolol 100 MG Oral Tab Take 1 tablet (100 mg total) by mouth daily. 90 tablet 3    risperiDONE 0.5 MG Oral Tab            Review of Systems:  Review of Systems   Constitutional: Negative.    Cardiovascular:  Positive for leg swelling.   Genitourinary:         Rectal pain from hemorrhoid         Objective:   /62   Pulse 87   Resp 17   Ht 5' 8\" (1.727 m)   Wt 221 lb (100.2 kg)   SpO2 98%   BMI 33.60 kg/m²  Estimated body mass index is 33.6 kg/m² as calculated from the following:    Height as of this encounter: 5' 8\" (1.727 m).    Weight as of this encounter: 221 lb (100.2 kg).  Physical Exam  Vitals reviewed.   Constitutional:       Appearance: Normal appearance.   Cardiovascular:      Rate and Rhythm: Normal rate and regular rhythm.   Pulmonary:      Effort: Pulmonary effort is normal.      Breath sounds: Normal breath sounds.    Genitourinary:     Rectum: External hemorrhoid present.   Musculoskeletal:      Right ankle: Swelling present.      Left ankle: Swelling present.      Comments: mild   Neurological:      Mental Status: He is alert.           Assessment & Plan:   1. Localized edema (Primary)  2. Hemorrhoids, unspecified hemorrhoid type  Other orders  -     Hydrocortisone (Perianal); Place 1 Application rectally daily as needed for Hemorrhoids.  Dispense: 45 g; Refill: 1    Mild ankle edema.  Recent three hour car ride. No calf pain.  Elevate feet, low sodium diet and see if cont to improve.  If worsens let me know. Go on metamusil daily.  Cream as needed.  Not prolapsed.  Mild.        No follow-ups on file.    Mehrdad Solano DO, 5/9/2024, 1:55 PM

## 2024-06-13 RX ORDER — PROPYLTHIOURACIL 50 MG/1
100 TABLET ORAL 3 TIMES DAILY
Qty: 540 TABLET | Refills: 3 | Status: SHIPPED | OUTPATIENT
Start: 2024-06-13

## 2024-06-13 NOTE — TELEPHONE ENCOUNTER
REFILL PASSED PER [facility] PROTOC[facility]S    Requested Prescriptions   Pending Prescriptions Disp Refills    PROPYLTHIOURACIL 50 MG Oral Tab [Pharmacy Med Name: Propylthiouracil 50 Mg Tab Endo] 540 tablet 0     Sig: TAKE TWO TABLETS BY MOUTH THREE TIMES DAILY       Hyperthyroid Medication Protocol Passed - 6/10/2024  9:28 AM        Passed - In person appointment or virtual visit in the past 12 mos or appointment in next 3 mos     Recent Outpatient Visits              1 month ago Localized edema    [facility], Mehrdad Diego, DO    Office Visit    5 months ago Primary osteoarthritis of right knee    [facility] Jose Angel Engle MD    Office Visit    6 months ago Alzheimer's disease, unspecified ([facility]    [facility]    11 months ago [facility]usion, right knee    [facility]    [facility]    11 months ago Right knee pain, unspecified chronicity    [facility]    [facility]                      Passed - TSH resulted in past 12 months        Passed - Last TSH within normal limits     Lab Results   Component Value Date    TSH 3.684 12/14/2023    THYROIDFUNC 0.09 (L) 03/24/2016                        Recent Outpatient Visits              1 month ago Localized edema    [facility]     [facility]    5 months ago [facility]    [facility]    6 months ago Alzheimer's disease, unspecified (HCC)    [facility]    Office Visit    11 months ago Effusion, right knee    [facility],  [address], Mehrdad Diego, DO    Office Visit    11 months ago Right knee pain, unspecified chronicity    [facility], [address], Mehrdad Diego, DO    Office Visit

## 2024-08-15 ENCOUNTER — HOSPITAL ENCOUNTER (INPATIENT)
Facility: HOSPITAL | Age: 84
End: 2024-08-15
Attending: EMERGENCY MEDICINE | Admitting: HOSPITALIST
Payer: MEDICARE

## 2024-08-15 ENCOUNTER — HOSPITAL ENCOUNTER (INPATIENT)
Facility: HOSPITAL | Age: 84
LOS: 7 days | Discharge: HOME | End: 2024-08-22
Attending: EMERGENCY MEDICINE | Admitting: HOSPITALIST
Payer: MEDICARE

## 2024-08-15 ENCOUNTER — NURSE TRIAGE (OUTPATIENT)
Dept: FAMILY MEDICINE CLINIC | Facility: CLINIC | Age: 84
End: 2024-08-15

## 2024-08-15 ENCOUNTER — APPOINTMENT (OUTPATIENT)
Dept: GENERAL RADIOLOGY | Facility: HOSPITAL | Age: 84
End: 2024-08-15
Attending: EMERGENCY MEDICINE
Payer: MEDICARE

## 2024-08-15 DIAGNOSIS — K92.2 GASTROINTESTINAL HEMORRHAGE, UNSPECIFIED GASTROINTESTINAL HEMORRHAGE TYPE: Primary | ICD-10-CM

## 2024-08-15 DIAGNOSIS — D64.9 SEVERE ANEMIA: ICD-10-CM

## 2024-08-15 DIAGNOSIS — K64.9 HEMORRHOIDS: ICD-10-CM

## 2024-08-15 LAB
ANION GAP SERPL CALC-SCNC: 8 MMOL/L (ref 0–18)
ANTIBODY SCREEN: NEGATIVE
BASOPHILS # BLD AUTO: 0.02 X10(3) UL (ref 0–0.2)
BASOPHILS NFR BLD AUTO: 0.4 %
BNP SERPL-MCNC: 175 PG/ML
BNP SERPL-MCNC: 217 PG/ML
BUN BLD-MCNC: 21 MG/DL (ref 9–23)
BUN/CREAT SERPL: 20.4 (ref 10–20)
CALCIUM BLD-MCNC: 9.4 MG/DL (ref 8.7–10.4)
CHLORIDE SERPL-SCNC: 108 MMOL/L (ref 98–112)
CO2 SERPL-SCNC: 27 MMOL/L (ref 21–32)
CREAT BLD-MCNC: 1.03 MG/DL
DEPRECATED HBV CORE AB SER IA-ACNC: 4.2 NG/ML
DEPRECATED RDW RBC AUTO: 49.2 FL (ref 35.1–46.3)
EGFRCR SERPLBLD CKD-EPI 2021: 72 ML/MIN/1.73M2 (ref 60–?)
EOSINOPHIL # BLD AUTO: 0.04 X10(3) UL (ref 0–0.7)
EOSINOPHIL NFR BLD AUTO: 0.9 %
ERYTHROCYTE [DISTWIDTH] IN BLOOD BY AUTOMATED COUNT: 21.9 % (ref 11–15)
GLUCOSE BLD-MCNC: 107 MG/DL (ref 70–99)
HCT VFR BLD AUTO: 23.5 %
HCT VFR BLD AUTO: 26.6 %
HGB BLD-MCNC: 6.3 G/DL
HGB BLD-MCNC: 7.6 G/DL
IMM GRANULOCYTES # BLD AUTO: 0.01 X10(3) UL (ref 0–1)
IMM GRANULOCYTES NFR BLD: 0.2 %
IRON SATN MFR SERPL: 3 %
IRON SERPL-MCNC: 8 UG/DL
LYMPHOCYTES # BLD AUTO: 1.5 X10(3) UL (ref 1–4)
LYMPHOCYTES NFR BLD AUTO: 33.3 %
MCH RBC QN AUTO: 17.1 PG (ref 26–34)
MCHC RBC AUTO-ENTMCNC: 26.8 G/DL (ref 31–37)
MCV RBC AUTO: 63.9 FL
MONOCYTES # BLD AUTO: 0.48 X10(3) UL (ref 0.1–1)
MONOCYTES NFR BLD AUTO: 10.7 %
NEUTROPHILS # BLD AUTO: 2.45 X10 (3) UL (ref 1.5–7.7)
NEUTROPHILS # BLD AUTO: 2.45 X10(3) UL (ref 1.5–7.7)
NEUTROPHILS NFR BLD AUTO: 54.5 %
OSMOLALITY SERPL CALC.SUM OF ELEC: 299 MOSM/KG (ref 275–295)
PLATELET # BLD AUTO: 174 10(3)UL (ref 150–450)
PLATELETS.RETICULATED NFR BLD AUTO: 4.8 % (ref 0–7)
POTASSIUM SERPL-SCNC: 3.8 MMOL/L (ref 3.5–5.1)
Q-T INTERVAL: 402 MS
QRS DURATION: 114 MS
QTC CALCULATION (BEZET): 466 MS
R AXIS: -30 DEGREES
RBC # BLD AUTO: 3.68 X10(6)UL
RH BLOOD TYPE: POSITIVE
RH BLOOD TYPE: POSITIVE
SODIUM SERPL-SCNC: 143 MMOL/L (ref 136–145)
T AXIS: 78 DEGREES
TIBC SERPL-MCNC: 250 UG/DL (ref 250–425)
TRANSFERRIN SERPL-MCNC: 168 MG/DL (ref 215–365)
TROPONIN I SERPL HS-MCNC: 9 NG/L
VENTRICULAR RATE: 81 BPM
WBC # BLD AUTO: 4.5 X10(3) UL (ref 4–11)

## 2024-08-15 PROCEDURE — 71045 X-RAY EXAM CHEST 1 VIEW: CPT | Performed by: EMERGENCY MEDICINE

## 2024-08-15 PROCEDURE — 99223 1ST HOSP IP/OBS HIGH 75: CPT | Performed by: STUDENT IN AN ORGANIZED HEALTH CARE EDUCATION/TRAINING PROGRAM

## 2024-08-15 PROCEDURE — 30233N1 TRANSFUSION OF NONAUTOLOGOUS RED BLOOD CELLS INTO PERIPHERAL VEIN, PERCUTANEOUS APPROACH: ICD-10-PCS | Performed by: INTERNAL MEDICINE

## 2024-08-15 PROCEDURE — 99223 1ST HOSP IP/OBS HIGH 75: CPT | Performed by: HOSPITALIST

## 2024-08-15 RX ORDER — SODIUM CHLORIDE 9 MG/ML
INJECTION, SOLUTION INTRAVENOUS ONCE
Status: COMPLETED | OUTPATIENT
Start: 2024-08-15 | End: 2024-08-15

## 2024-08-15 RX ORDER — FUROSEMIDE 10 MG/ML
40 INJECTION INTRAMUSCULAR; INTRAVENOUS
Status: DISCONTINUED | OUTPATIENT
Start: 2024-08-15 | End: 2024-08-17

## 2024-08-15 RX ORDER — METOCLOPRAMIDE HYDROCHLORIDE 5 MG/ML
10 INJECTION INTRAMUSCULAR; INTRAVENOUS EVERY 8 HOURS PRN
Status: DISCONTINUED | OUTPATIENT
Start: 2024-08-15 | End: 2024-08-16

## 2024-08-15 RX ORDER — ACETAMINOPHEN 500 MG
500 TABLET ORAL EVERY 4 HOURS PRN
Status: DISCONTINUED | OUTPATIENT
Start: 2024-08-15 | End: 2024-08-19

## 2024-08-15 RX ORDER — ONDANSETRON 2 MG/ML
4 INJECTION INTRAMUSCULAR; INTRAVENOUS EVERY 6 HOURS PRN
Status: DISCONTINUED | OUTPATIENT
Start: 2024-08-15 | End: 2024-08-22

## 2024-08-15 RX ORDER — TEMAZEPAM 15 MG/1
15 CAPSULE ORAL NIGHTLY PRN
Status: DISCONTINUED | OUTPATIENT
Start: 2024-08-15 | End: 2024-08-22

## 2024-08-15 RX ORDER — SODIUM CHLORIDE 9 MG/ML
INJECTION, SOLUTION INTRAVENOUS CONTINUOUS
Status: CANCELLED | OUTPATIENT
Start: 2024-08-15 | End: 2024-08-15

## 2024-08-15 NOTE — TELEPHONE ENCOUNTER
Action Requested: Summary for Provider     []  Critical Lab, Recommendations Needed  [] Need Additional Advice  [x]   FYI    []   Need Orders  [] Need Medications Sent to Pharmacy  []  Other     SUMMARY: Going to Emergency Room now, if worse, will call 911    Reason for call: Chest pain  Onset: Today      Son in law on verbal release  calling office.Patient's date of birth and full name both confirmed.   Patient is present with him.   Today - patient says he has \"Heaving breathing\"  Son says he is speaking normally and does not appear to be short of breath    /63 HR 85 (higher than his usual)     Has history of Atrial Fibrillation     RN advised Emergency Room now. Tasi says he will take him to Emergency Room. If worsening symptoms call 911.       Reason for Disposition   Difficulty breathing    Protocols used: Chest Pain-A-[place]

## 2024-08-15 NOTE — ED INITIAL ASSESSMENT (HPI)
Pt presents to the ER with c/o GIANCARLO x 3 days.    Pt taking diuretics as prescribed per pt's son in law.    H/o Afib

## 2024-08-15 NOTE — PLAN OF CARE
Patient received from ED, vital signs stable, no complaints of pain. Son at bedside and assisted with admission questions. Updated on plan of care and assisted with care needs. Blood administered per policy, consent for colonoscopy signed and placed in chart. Fall precautions in place, call light education provided.     Problem: Patient Centered Care  Goal: Patient preferences are identified and integrated in the patient's plan of care  Description: Interventions:  - What would you like us to know as we care for you?from home with family   - Provide timely, complete, and accurate information to patient/family  - Incorporate patient and family knowledge, values, beliefs, and cultural backgrounds into the planning and delivery of care  - Encourage patient/family to participate in care and decision-making at the level they choose  - Honor patient and family perspectives and choices  Outcome: Progressing     Problem: Patient/Family Goals  Goal: Patient/Family Long Term Goal  Description: Patient's Long Term Goal: GI stability    Interventions:  - colonoscopy, GI follow up  - See additional Care Plan goals for specific interventions  Outcome: Progressing  Goal: Patient/Family Short Term Goal  Description: Patient's Short Term Goal: hgb wnl    Interventions:   - blood administration, clear liquids, prep for coloscopy   - See additional Care Plan goals for specific interventions  Outcome: Progressing

## 2024-08-15 NOTE — CONSULTS
Is this a shared or split note between Advanced Practice Provider and Physician? Yes       [facility]   Gastroenterology Consultation Note    Itzel De Leon  Patient Status:    Emergency  Date of Admission:         8/15/2024, Hospital day #0  Attending:   Clemente Heath MD  PCP:     Mehrdad Solano DO    Reason for Consultation:  Anemia, BRBPR    History of Present Illness:  Itzel De Leon is a 83 year old male w/ PMHx of a fib (on xarelto), thrombocytopenia, alzheimer's who presented to ER for shortness of breath.Patient's son assists with history. Per son patient has had mild SOB, but worsening today causing him to stop and sit down while getting around the house. GI consulted for anemia. Per patient, he has had intermittent episodes of bright red blood in his stool. He has previously declined colonoscopy to evaluate bright red blood in stool. States over last month has noted increase in blood that can fill toilet paper. He often can strain with BM. Patient notes dizziness as well. Denies melena, abdominal pain. Denies GERD, dysphagia and globus. Denies nausea and vomiting. Denies diarrhea. Patient drinks less than 1 drink a month. Denies use of things like ibuprofen. Denies tobacco use. Appetite has been good. Weight has been stable.   No prior colonoscopy.     In ER, hemoglobin 6.3 down from 11.7 one month prior. BUN/Cr ratio 20.4. BUN 21, Cr 1.03.     Pertinent Family Hx:  - No known history of esophageal, gastric or colon cancers  - No known IBD  - No known liver pathologies    Endoscopy Hx:  - none    Social Hx:  - No tobacco use/minimal ETOH  - Denies illicit drug use  - Lives with: family   - Occupation:retired       History:  Past Medical History:    Age-related nuclear cataract of both eyes    Asteroid hyalosis of left eye    Atrial fibrillation (HCC)    Essential hypertension    Hypertension    Hyperthyroidism    Meibomian gland dysfunction (MGD) of upper and lower lids of both eyes     Neck mass    left-FNA    Unspecified essential hypertension     Past Surgical History:   Procedure Laterality Date    Cataract extraction w/  intraocular lens implant Right 2/15/16    RJM; significant anxiety in OR, was on Propofol Drip; suggest general anesthesia for OS     Electrocardiogram, complete  07-    scanned to media tab, 07-     Family History   Problem Relation Age of Onset    Cancer Father         lung    Diabetes Neg     Glaucoma Neg     Macular degeneration Neg       reports that he has never smoked. He has never been exposed to tobacco smoke. He has never used smokeless tobacco. He reports that he does not drink alcohol and does not use drugs.    Allergies:  No Known Allergies    Medications:  No current facility-administered medications for this encounter.    Review of Systems:   CONSTITUTIONAL:  negative for fevers, chills, unintentional weight loss   EYES:  negative for diplopia or change in vision   RESPIRATORY:  negative for severe shortness of breath  CARDIOVASCULAR:  negative for crushing sub-sternal chest pain  GASTROINTESTINAL:  see HPI  GENITOURINARY:  negative for dysuria or gross hematuria  INTEGUMENT/BREAST:  SKIN:  negative for jaundice or new rash   ALLERGIC/IMMUNOLOGIC:  negative for hay fever   ENDOCRINE:  negative for cold intolerance and heat intolerance  MUSCULOSKELETAL:  negative for joint effusion/severe erythema  NEURO: negative for new loss of consciousness or dizziness   BEHAVIOR/PSYCH:  negative for psychotic behavior    Physical Exam:    Blood pressure 121/69, pulse 72, temperature 98.1 °F (36.7 °C), temperature source Oral, resp. rate 15, height 5' 6\" (1.676 m), weight 220 lb (99.8 kg), SpO2 99%. Body mass index is 35.51 kg/m².    Gen: awake, alert patient, NAD  HEENT: EOMI, the sclera appears anicteric, oropharynx clear, mucus membranes appear moist  CV: RRR  Lung: no conversational dyspnea   Abdomen: mild abdomen distention, soft NT abdomen with NABS  appreciated   Back: No CVA tenderness   Skin: dry, warm, no jaundice  Ext: + LE edema is evident  Neuro: Alert and interactive  Psych: calm, cooperative    Laboratory Data:  Lab Results   Component Value Date    WBC 4.5 08/15/2024    HGB 6.3 08/15/2024    HCT 23.5 08/15/2024    .0 08/15/2024    CREATSERUM 1.03 08/15/2024    BUN 21 08/15/2024     08/15/2024    K 3.8 08/15/2024     08/15/2024    CO2 27.0 08/15/2024     08/15/2024    CA 9.4 08/15/2024       Imaging:  XR CHEST AP PORTABLE  (CPT=71045)    Result Date: 8/15/2024  CONCLUSION:  1. Cardiomegaly and prominent central vasculature. 2. Prominent basilar pulmonary markings.  No acute pulmonary disease.    Dictated by (CST): Rob Isaac MD on 8/15/2024 at 2:13 PM     Finalized by (CST): Rbo Isaac MD on 8/15/2024 at 2:14 PM           Assessment & Plan   Itzel De Leon is a 83 year old male w/ PMHx of a fib (on xarelto), thrombocytopenia, alzheimer's who presented to ER for shortness of breath.Patient's son assists with history. Per son patient has had mild SOB, but worsening today causing him to stop and sit down while getting around the house. GI consulted for anemia. Per patient, he has had intermittent episodes of bright red blood in his stool. He has previously declined colonoscopy to evaluate bright red blood in stool. States over last month has noted increase in blood that can fill toilet paper. He often can strain with BM. Patient notes dizziness as well. Denies melena, abdominal pain. Denies GERD, dysphagia and globus. Denies nausea and vomiting. Denies diarrhea. Patient drinks less than 1 drink a month. Denies use of things like ibuprofen. Denies tobacco use. Appetite has been good. Weight has been stable.     #anemia  #brbpr   #SOB  -hemoglobin in ER 6.3, down form prior of 11 1 month ago  -notes history of brbpr, intermittent  -no prior colonoscopy, no family hx of colon cancer  -discussed concern for  malignancy, AVM, hemorrhoids   -plan for colonoscopy tomorrow     #afib  -on xarelto, will be held now  -cardiology following     Recommend:  -transfuse 1 unit PRBC  -trend hemoglobin, goal >7  -clear liquid diet  -NPO midnight  -golytely   -colonoscopy tomorrow  -consider EGD if no source of bleeding on colonoscopy     Colonoscopy consent: I have discussed the risks, benefits, and alternatives to colonoscopy with the patient [who demonstrated understanding], including but not limited to the risks of bleeding, infection, pain, death, as well as the risks of anesthesia and perforation all leading to prolonged hospitalization, surgical intervention, or even death. I also specifically mentioned the miss rate of colonoscopy of 5-10% in the best of all circumstances. All questions were answered to the patient’s satisfaction. The patient signed informed consent and elected to proceed with colonoscopy with intervention [i.e. polypectomy, stent placement, etc.] as indicated.    Thank you for the opportunity to participate in the care of this patient.    Case discussed with Joe Gallegos MD.     Lauren Ricardo PA-C  [facility]  8/15/2024

## 2024-08-15 NOTE — ED PROVIDER NOTES
Patient Seen in: [facility] Emergency Department    History     Chief Complaint   Patient presents with    Difficulty Breathing       HPI    The patient presents to the ED complaining of shortness of breath for the past 3 days.  His son provides all of the history and translates for the patient at his request.  Son states that shortness of breath is worse with exertion and the patient now needs to pause and rest for going up the stairs.  He states that he has no chest pain or other complaints.  Has noted some blood in his stool for quite some time.  History of A-fib on Xarelto.    History reviewed.   Past Medical History:    Age-related nuclear cataract of both eyes    Asteroid hyalosis of left eye    Atrial fibrillation (HCC)    Essential hypertension    Hypertension    Hyperthyroidism    Meibomian gland dysfunction (MGD) of upper and lower lids of both eyes    Neck mass    left-FNA    Unspecified essential hypertension       History reviewed.   Past Surgical History:   Procedure Laterality Date    Cataract extraction w/  intraocular lens implant Right 2/15/16    RJM; significant anxiety in OR, was on Propofol Drip; suggest general anesthesia for OS     Electrocardiogram, complete  07-    scanned to media tab, 07-         Medications :  Medications Prior to Admission   Medication Sig Dispense Refill Last Dose    propylthiouracil 50 MG Oral Tab Take 2 tablets (100 mg total) by mouth 3 (three) times daily. 540 tablet 3 8/15/2024    rosuvastatin 10 MG Oral Tab rosuvastatin 10 mg tablet, [RxNorm: 778207]   8/15/2024    hydrocortisone (PROCTO-MED HC) 2.5 % External Cream Place 1 Application rectally daily as needed for Hemorrhoids. 45 g 1 8/14/2024    furosemide 20 MG Oral Tab Take 2 tablets (40 mg total) by mouth daily. 180 tablet 3 8/15/2024    enalapril 20 MG Oral Tab Take 1 tablet (20 mg total) by mouth daily. 90 tablet 1 8/15/2024    Rivaroxaban (XARELTO) 20 MG Oral Tab Take 1 tablet (20 mg  total) by mouth daily with food. 30 tablet 0 8/14/2024    risperiDONE 0.5 MG Oral Tab    8/14/2024    atenolol 100 MG Oral Tab Take 1 tablet (100 mg total) by mouth daily. (Patient not taking: Reported on 8/15/2024) 90 tablet 3 Not Taking        Family History   Problem Relation Age of Onset    Cancer Father         lung    Diabetes Neg     Glaucoma Neg     Macular degeneration Neg        Smoking Status:   Social History     Socioeconomic History    Marital status:    Tobacco Use    Smoking status: Never     Passive exposure: Never    Smokeless tobacco: Never   Vaping Use    Vaping status: Never Used   Substance and Sexual Activity    Alcohol use: No    Drug use: No   Other Topics Concern    Caffeine Concern Yes     Comment: coffee, 2 cups daily       Constitutional and vital signs reviewed.      Social History and Family History elements reviewed from today, pertinent positives to the presenting problem noted.    Physical Exam     ED Triage Vitals [08/15/24 1306]   /69   Pulse 89   Resp 18   Temp 98.1 °F (36.7 °C)   Temp src Oral   SpO2 98 %   O2 Device None (Room air)       All measures to prevent infection transmission during my interaction with the patient were taken. Handwashing was performed prior to and after the exam.  Stethoscope and any equipment used during my examination was cleaned with super sani-cloth germicidal wipes following the exam.     Physical Exam  Vitals and nursing note reviewed.   Constitutional:       General: He is not in acute distress.     Appearance: He is well-developed. He is not ill-appearing or toxic-appearing.   HENT:      Head: Normocephalic and atraumatic.   Eyes:      General:         Right eye: No discharge.         Left eye: No discharge.      Conjunctiva/sclera: Conjunctivae normal.   Neck:      Trachea: No tracheal deviation.   Cardiovascular:      Rate and Rhythm: Normal rate and regular rhythm.   Pulmonary:      Effort: Pulmonary effort is normal. No  respiratory distress.      Breath sounds: No stridor. Rales present.   Abdominal:      General: There is no distension.      Palpations: Abdomen is soft.   Musculoskeletal:         General: No deformity.   Skin:     General: Skin is warm and dry.   Neurological:      Mental Status: He is alert and oriented to person, place, and time.   Psychiatric:         Mood and Affect: Mood normal.         Behavior: Behavior normal.         ED Course        Labs Reviewed   BASIC METABOLIC PANEL (8) - Abnormal; Notable for the following components:       Result Value    Glucose 107 (*)     BUN/CREA Ratio 20.4 (*)     Calculated Osmolality 299 (*)     All other components within normal limits   CBC WITH DIFFERENTIAL WITH PLATELET - Abnormal; Notable for the following components:    RBC 3.68 (*)     HGB 6.3 (*)     HCT 23.5 (*)     MCV 63.9 (*)     MCH 17.1 (*)     MCHC 26.8 (*)     RDW-SD 49.2 (*)     RDW 21.9 (*)     All other components within normal limits   BNP (B TYPE NATRIURETIC PEPTIDE) - Abnormal; Notable for the following components:    Beta Natriuretic Peptide 175 (*)     All other components within normal limits   BNP (B TYPE NATRIURETIC PEPTIDE) - Abnormal; Notable for the following components:    Beta Natriuretic Peptide 217 (*)     All other components within normal limits   FERRITIN - Abnormal; Notable for the following components:    Ferritin 4.2 (*)     All other components within normal limits   IRON AND TIBC - Abnormal; Notable for the following components:    Iron 8 (*)     Transferrin 168 (*)     % Saturation 3 (*)     All other components within normal limits   TROPONIN I HIGH SENSITIVITY - Normal   MD BLOOD SMEAR CONSULT   TYPE AND SCREEN    Narrative:     The following orders were created for panel order Type and screen.                  Procedure                               Abnormality         Status                                     ---------                               -----------         ------                                      ABORH (Blood Type)[[id number]]                               Final result                               Antibody Screen[[id number]]                                  Final result                                                 Please view results for these tests on the individual orders.   PREPARE RBC   ABORH (BLOOD TYPE)   ANTIBODY SCREEN   ABORH CONFIRMATION     EKG    Rate, intervals and axes as noted on EKG Report.  Rate: Normal, 81 bpm  Rhythm: Atrial Fibrillation  Reading: Atrial fibrillation, left axis deviation, nonspecific ST abnormality, abnormal EKG           As Interpreted by me    Imaging Results Available and Reviewed while in ED: XR CHEST AP PORTABLE  (CPT=71045)    Result Date: 8/15/2024  CONCLUSION:  1. Cardiomegaly and prominent central vasculature. 2. Prominent basilar pulmonary markings.  No acute pulmonary disease.    Dictated by (CST): Rob Isaac MD on 8/15/2024 at 2:13 PM     Finalized by (CST): Rob Isaac MD on 8/15/2024 at 2:14 PM         ED Medications Administered:   Medications   pantoprazole (Protonix) 40 mg in sodium chloride 0.9% PF 10 mL IV push (40 mg Intravenous Given 8/15/24 1702)   furosemide (Lasix) 10 mg/mL injection 40 mg (40 mg Intravenous Given 8/15/24 1702)   acetaminophen (Tylenol Extra Strength) tab 500 mg (has no administration in time range)   ondansetron (Zofran) 4 MG/2ML injection 4 mg (has no administration in time range)   metoclopramide (Reglan) 5 mg/mL injection 10 mg (has no administration in time range)   temazepam (Restoril) cap 15 mg (has no administration in time range)   polyethylene glycol-electrolyte (Golytely) 236 g oral solution 2,000 mL (has no administration in time range)   polyethylene glycol-electrolyte (Golytely) 236 g oral solution 2,000 mL (has no administration in time range)   pantoprazole (Protonix) 40 mg in sodium chloride 0.9% PF 10 mL IV push (40 mg Intravenous Given 8/15/24 1516)   sodium  chloride 0.9% infusion ( Intravenous New Bag 8/15/24 1707)         MDM     Vitals:    08/15/24 1601 08/15/24 1709 08/15/24 1711 08/15/24 1725   BP: 153/80 107/53 107/53 153/63   BP Location: Right arm      Pulse: 77  81 88   Resp: 18  16 16   Temp: 97.5 °F (36.4 °C)  98.2 °F (36.8 °C) 98.2 °F (36.8 °C)   TempSrc: Oral  Oral Oral   SpO2: 100%  100% 100%   Weight: 99.6 kg      Height:         *I personally reviewed and interpreted all ED vitals.    Pulse Ox: 100%, Room air, Normal     Monitor Interpretation:   atrial fibrillation, with ventricular rate of 80  as interpreted by me.  The cardiac monitor was ordered to monitor heart rate.    Differential Diagnosis/ Diagnostic Considerations: CHF exacerbation, GI blood loss, anemia, other other    Complicating Factors: The patient already has does not have any pertinent problems on file. to contribute to the complexity of this ED evaluation.    Medical Decision Making  The patient presents to the ED with shortness of breath with exertion over the past several days.  Nondistressed on examination in the ED.  A-fib on monitor but rate controlled.  Laboratory testing concerning for significant anemia.  Likely GI blood loss.  Patient started on Protonix.  Blood transfusion ordered.  I discussed with Dr. Gallegos for GI consultation and Dr. Carl for admission and [facility] cardiology for consultation.    Problems Addressed:  Gastrointestinal hemorrhage, unspecified gastrointestinal hemorrhage type: acute illness or injury that poses a threat to life or bodily functions  Severe anemia: acute illness or injury that poses a threat to life or bodily functions    Amount and/or Complexity of Data Reviewed  Independent Historian:      Details: The patient's son provides history details and translates at his request  Labs: ordered. Decision-making details documented in ED Course.  Radiology: ordered and independent interpretation performed. Decision-making details documented in ED  Course.     Details: I personally reviewed the patient's chest x-ray image and noted mild cardiomegaly    ECG/medicine tests: ordered and independent interpretation performed. Decision-making details documented in ED Course.  Discussion of management or test interpretation with external provider(s):   I discussed with Dr. Gallegos for GI consultation and Dr. Carl for admission and [facility] cardiology for consultation.    Risk  Risk Details: Critical Care:  I spent a total of 46 minutes of critical care time in obtaining history, performing a physical exam, bedside monitoring of interventions, collecting and interpreting tests and discussion with consultants but not including time spent performing procedures.          Condition upon leaving the department: Stable    Disposition and Plan     Clinical Impression:  1. Gastrointestinal hemorrhage, unspecified gastrointestinal hemorrhage type    2. Severe anemia        Disposition:  Admit    Follow-up:  No follow-up provider specified.    Medications Prescribed:  Current Discharge Medication List          Hospital Problems       Present on Admission  Date Reviewed: 5/9/2024            ICD-10-CM Noted POA    * (Principal) Gastrointestinal hemorrhage, unspecified gastrointestinal hemorrhage type K92.2 8/15/2024 Unknown    GI bleed K92.2 8/15/2024 Unknown    Severe anemia D64.9 8/15/2024 Unknown

## 2024-08-15 NOTE — H&P
[facility]    PATIENT'S NAME: BELEM TAYLOR   ATTENDING PHYSICIAN: Clemente Heath MD   PATIENT ACCOUNT#:   [id number]    LOCATION:  [address]  MEDICAL RECORD #:   [id number]       YOB: 1940  ADMISSION DATE:       08/15/2024    HISTORY AND PHYSICAL EXAMINATION    CHIEF COMPLAINT:  Microcytic anemia, gastrointestinal bleed, and fluid overload status.    HISTORY OF PRESENT ILLNESS:  The patient is an 83-year-old  male who was brought in today for evaluation by his family for progressive shortness of breath.  CBC showed hemoglobin of 6.3 which is below his baseline, MCV 63.9.  B-natriuretic peptide 175.  Troponin was negative.  Chemistry showed BUN and creatinine ratio of 20.4.  Chest x-ray showed cardiomegaly with prominent central vasculature.  The patient was started on IV Protonix and Lasix, and he will be admitted to the hospital for further management.    PAST MEDICAL HISTORY:  Chronic atrial fibrillation, obesity.  He is anticoagulated with Xarelto.  Hyperthyroidism, hypertension, hyperlipidemia, osteoarthritis.    PAST SURGICAL HISTORY:  Cataract procedure.    MEDICATIONS:  Please see medication reconciliation list.     ALLERGIES:  No known drug allergies..    FAMILY HISTORY:  Father had lung cancer.    SOCIAL HISTORY:  No tobacco, alcohol, or drug use.  Retired.  Sedentary lifestyle.  Lives with his family.      REVIEW OF SYSTEMS:  Progressive dyspnea on exertion.  Overall poor historian.  He reported that he sees red blood with his bowel movements often because of hemorrhoids.  He does not recall if he ever had a colonoscopy.  He has been progressively short of breath with orthopnea.  No chest pain, no fever or chills, no dyspepsia or nausea, vomiting.  Other 12-point review of systems is negative.        PHYSICAL EXAMINATION:    GENERAL:  Alert and oriented to time, place, and person.  Fatigued.  Does not appear to be in distress.    VITAL SIGNS:  Temperature  98.4, pulse 72, respiratory rate 15, blood pressure 120/69, pulse ox 99% on room air.  HEENT:  Atraumatic.  Oropharynx clear.  Moist mucous membranes.    NECK:  Supple.  No lymphadenopathy.  Jugular venous distention and pulsation noted.  LUNGS:  Diminished breathing sounds, both lung bases.  HEART:  Irregular rhythm.  S1 and S2 auscultated.  Rate controlled on monitor.  ABDOMEN:  Soft, obese.  Positive bowel sounds.  No distention, no tenderness.  EXTREMITIES:  Edema +2 to 3, both legs.  No clubbing or cyanosis.    NEUROLOGIC:  Motor and sensory intact.       ASSESSMENT:    1.   Gastrointestinal bleed.  2.   Posthemorrhagic microcytic anemia, microcytosis indicates some chronicity.  3.   Fluid overload status.  4.   Chronic atrial fibrillation.    PLAN:  The patient will be admitted to telemetry floor.  IV Lasix, Protonix.  Blood transfusion.  Gastroenterology and Cardiology consult.  Hold Xarelto.  Keep him on clear liquid diet for potential colonoscopy and possible EGD.  Obtain 2D echocardiogram with Doppler.  Further recommendations to follow.      Dictated By Aimee Carl MD  d: 08/15/2024 15:23:50  t: 08/15/2024 15:29:07  Job [id number]  FB/

## 2024-08-15 NOTE — ED QUICK NOTES
Orders for admission, patient is aware of plan and ready to go upstairs. Any questions, please call ED RN joselito at extension [phone].     Patient Covid vaccination status: Fully vaccinated     COVID Test Ordered in ED: None    COVID Suspicion at Admission: N/A    Running Infusions:  None    Mental Status/LOC at time of transport: aox4    Other pertinent information: hold xarelto  CIWA score: N/A   NIH score:  N/A

## 2024-08-15 NOTE — TELEPHONE ENCOUNTER
RN called patient's son in law on verbal release   Patient's date of birth and full name both confirmed.   No Emergency Room visit on file yet.   Per Tasi, patient took furosemide and had to use the bathroom , therefore they want to wait a few hours before going to Emergency Room .  RN advised Emergency Room still necessary, preferably now, and to monitor patient closely - if worse -call 911.   He verbalizes understanding of all information, and agreeable to plan.

## 2024-08-15 NOTE — PROGRESS NOTES
[facility] - CARDIOLOGY CONSULT NOTE    Itzel De Leon Patient Status:  Inpatient    1940 MRN [id number]   Location [facility] 5SW/SE Attending Aimee Carl MD   Hosp Day # 0 PCP Mehrdad Solano DO     Date of Admission:  8/15/2024  Date of Consult:  8/15/2024  I was asked by Aimee Carl MD to provide recommendations for evaluation and management of cardiac issues.  Impression:     Gastrointestinal hemorrhage, unspecified gastrointestinal hemorrhage type  Workup per GI.  Stable from cardiac standpoint for GI testing    Chronic atrial fibrillation   rates generally controlled on atenolol which he will continue severe anemia.   Blood thinners on hold until hemoglobin stable source of bleeding identified with echo to reassess cardiac structures function and pressures.  This will guide with long-term therapy.    Hypertension GI bleed  Blood pressure slightly elevated.  Will monitor on meds.  Patient also was on enalapril 20 mg daily.  May benefit from switching to long-acting ACE inhibitor And add low-dose diuretic if blood pressure remains high.    Hyperlipidemia on meds  LDL was 87 in March    Recommendations:  As above    Thank you for allowing me to participate in the care of your patient.    Joe Ocampo MD  [facility] 5C  8/15/2024    Reason for Consultation:   Shortness of breath A-fib and anemia    History of Present Illness:   Patient is a 83 year old male who was admitted to the hospital for Gastrointestinal hemorrhage, unspecified gastrointestinal hemorrhage type:  This is a pleasant [place]ic speaking male with history of atrial fibrillation hypertension and elevated cholesterol who presents for assessment of progressive shortness of breath for a week worse in the last 24 hours.  He came to the ER was found to be in A-fib with controlled rates.  He is found to have a significant drop in his hemoglobin.  He is rarely noted right bright blood in his  stools.  He is here with his son who translated.  He normally follows with Dr. Borges he occasionally notes ankle swelling which has not changed.  He has had no anginal symptoms.  He is compliant with his medicines.  Echo in 2016 showed normal LV function with mild MR and a PA pressure of 40.    Cardiac tests:    Past Medical History  Past Medical History:    Age-related nuclear cataract of both eyes    Asteroid hyalosis of left eye    Atrial fibrillation (HCC)    Essential hypertension    Hypertension    Hyperthyroidism    Meibomian gland dysfunction (MGD) of upper and lower lids of both eyes    Neck mass    left-FNA    Unspecified essential hypertension       Past Surgical History  Past Surgical History:   Procedure Laterality Date    Cataract extraction w/  intraocular lens implant Right 2/15/16    RJM; significant anxiety in OR, was on Propofol Drip; suggest general anesthesia for OS     Electrocardiogram, complete  07-    scanned to media tab, 07-       Family History  Family History   Problem Relation Age of Onset    Cancer Father         lung    Diabetes Neg     Glaucoma Neg     Macular degeneration Neg        Social History  Pediatric History   Patient Parents    Not on file     Other Topics Concern     Service Not Asked    Blood Transfusions Not Asked    Caffeine Concern Yes     Comment: coffee, 2 cups daily    Occupational Exposure Not Asked    Hobby Hazards Not Asked    Sleep Concern Not Asked    Stress Concern Not Asked    Weight Concern Not Asked    Special Diet Not Asked    Back Care Not Asked    Exercise Not Asked    Bike Helmet Not Asked    Seat Belt Not Asked    Self-Exams Not Asked   Social History Narrative    Not on file           Current Medications:  Current Facility-Administered Medications   Medication Dose Route Frequency    pantoprazole (Protonix) 40 mg in sodium chloride 0.9% PF 10 mL IV push  40 mg Intravenous Daily    furosemide (Lasix) 10 mg/mL injection 40 mg  40  mg Intravenous BID (Diuretic)    acetaminophen (Tylenol Extra Strength) tab 500 mg  500 mg Oral Q4H PRN    ondansetron (Zofran) 4 MG/2ML injection 4 mg  4 mg Intravenous Q6H PRN    metoclopramide (Reglan) 5 mg/mL injection 10 mg  10 mg Intravenous Q8H PRN    temazepam (Restoril) cap 15 mg  15 mg Oral Nightly PRN    sodium chloride 0.9% infusion   Intravenous Once     Medications Prior to Admission   Medication Sig    propylthiouracil 50 MG Oral Tab Take 2 tablets (100 mg total) by mouth 3 (three) times daily.    rosuvastatin 10 MG Oral Tab rosuvastatin 10 mg tablet, [RxNorm: 217627]    hydrocortisone (PROCTO-MED HC) 2.5 % External Cream Place 1 Application rectally daily as needed for Hemorrhoids.    furosemide 20 MG Oral Tab Take 2 tablets (40 mg total) by mouth daily.    enalapril 20 MG Oral Tab Take 1 tablet (20 mg total) by mouth daily.    Rivaroxaban (XARELTO) 20 MG Oral Tab Take 1 tablet (20 mg total) by mouth daily with food.    risperiDONE 0.5 MG Oral Tab     atenolol 100 MG Oral Tab Take 1 tablet (100 mg total) by mouth daily. (Patient not taking: Reported on 8/15/2024)       Allergies  No Known Allergies    Review of Systems:   10 pt ROS performed, separate from HPI  Review of Systems:  GENERAL: no fevers, chills, sweats  HEENT: no visual or hearing changes  SKIN: denies any unusual skin lesions or rashes  RESPIRATORY: shortness of breath with exertion  CARDIOVASCULAR: See HPI  GI: denies abdominal pain and denies heartburn  : no dysuria or hematuria  NEURO: denies headaches, focal weaknesses or paresthesias  All other systems were reviewed and negative.  Physical Exam:   Blood pressure 153/80, pulse 77, temperature 97.5 °F (36.4 °C), temperature source Oral, resp. rate 18, height 167.6 cm (5' 6\"), weight 219 lb 8 oz (99.6 kg), SpO2 100%.  Intake/Output:           Last 24 hours: No intake or output data in the 24 hours ending 08/15/24 1635   This shift: No intake/output data recorded.    Scheduled Meds:     pantoprazole  40 mg Intravenous Daily    furosemide  40 mg Intravenous BID (Diuretic)    sodium chloride   Intravenous Once         Physical Exam:    General: Comfortable, well-nourished, in no acute distress.  HEENT: Atraumatic.  No scleral icterus.  Mucous membranes are moist.  Oropharynx is clear.  Neck: supple,No JVD, carotids 2+, no bruits  Cardiac: irregular rate and rhythm.S1,S2 normal, No pathologic murmur.  Lungs: Clear with normal effort.  Normal excursions and effort.  Abdomen: Soft, non-tender. BS-present.  Extremities: Without clubbing, cyanosis.or edema.  Peripheral pulses present.  Neurologic: Alert and oriented x 3, normal affect. No dysarthria or gross motor deficits.  Psychiatric: Coooperative, calm. Alert and oriented x 3  Skin: Warm and dry. No rash    Results:     Laboratory Data:  Lab Results   Component Value Date    WBC 4.5 08/15/2024    HGB 6.3 (LL) 08/15/2024    HCT 23.5 (L) 08/15/2024    .0 08/15/2024    CREATSERUM 1.03 08/15/2024    BUN 21 08/15/2024     08/15/2024    K 3.8 08/15/2024     08/15/2024    CO2 27.0 08/15/2024     (H) 08/15/2024    CA 9.4 08/15/2024    ALB 3.6 07/18/2023    ALKPHO 52 07/18/2023    TP 7.3 07/18/2023    AST 18 07/18/2023    ALT 15 (L) 07/18/2023    T4F 1.0 12/14/2023    TSH 3.684 12/14/2023         Imaging:          XR CHEST AP PORTABLE  (CPT=71045)    Result Date: 8/15/2024  CONCLUSION:  1. Cardiomegaly and prominent central vasculature. 2. Prominent basilar pulmonary markings.  No acute pulmonary disease.    Dictated by (CST): Rob Isaac MD on 8/15/2024 at 2:13 PM     Finalized by (CST): Rob Isaac MD on 8/15/2024 at 2:14 PM

## 2024-08-15 NOTE — HISTORICAL OFFICE NOTE
[facility]  [address], [address] [address]  [phone]      Kenan De Leon  Progress Note  Demographics:  Name: Kenan De Leon YOB: 1940  Age: 83, Male Medical Record No: [id number]  Visited Date/Time: 01/08/2024 11:50 AM    Chief Complaints  f/u  History of Present Illness  Patient is an 82-year-old Lao speaking only male with a history of atrial fibrillation who presents to [facility] care.  He previously was being seen by Dr. Peacock.  He has known chronic atrial fibrillation that is rate controlled and asymptomatic.  He denies any chest pain, palpitations, shortness of breath, dizziness, or syncope.  He does have bilateral lower extremity edema that is chronic with venous stasis dermatitis and visible varicose veins.  He describes numbness of his right thigh and lower leg.  He does have mild heaviness and sometimes itchiness of his legs as well.    1/8/2024  Feels well, no cardiac symptoms.  For some reason his statin was discontinued over a year ago.  I cannot find any records as to why this happened.    Cardiac history:  Permanent atrial fibrillation  HTN  HLD  Varicose veins  Cardiac risk factors Hypertension, Dyslipidemia and Never smoked  Past Medical History  1.Chronic a-fib  Family History  1. Father - No history of Heart disease  2. Mother - No history of Heart disease  No from history of heart disease.  Social History  Smoking status Never smoked  Tobacco usage - No (Non-smoker for personal reasons (finding))  Patient speaks Lao only.  She lives at home with his daughter.  Denies any tobacco, alcohol, or illicit drug use.  Review of systems  Cardiovascular No history of Chest pain, Palpitations and Edema  Respiratory No history of SOB  Neurological No history of Numbness and Limb weakness  Physical Examination  Constitutional 99%o2sat  Vitals Left Arm Sitting  / 62 mmHg, Pulse rate 97 bpm, Height in 5' 9\", BMI: 29.7, Weight in 201  lbs (or) 91 kgs and BSA : 2.13 cc/m²  General Appearance No Acute Distress and Normal Body habitus  Cardiovascular   EKG/Other abnormalities  GENERAL: in no apparent distress  HEENT: Normal  NECK: no JVD, normal carotid pulses without bruits. No lymphadenopathy  LUNGS: normal excursion, clear to auscultation bilaterally  HEART: RRR, nl S1/S2, no gallop, no murmur, no rub  ABD: soft, nontender, nondistended, normal bowel sounds  EXTREMITIES: no cyanosis, clubbing.  1+ bilateral lower extremity edema with venous stasis dermatitis and visible varicose veins.  SKIN: warm, dry, normal turgor  NEURO: alert, oriented x3, symmetrical face, no dysarthria, no focal deficits  PSYCH: cooperative, calm    ECG 12/5/2022 atrial fibrillation, 83 bpm, septal infarct, left axis deviation  Allergies  No medication allergies noted.  Medications (Info obtained by: Verbal)  1.atenoloL 100 mg tablet, Take 1 tablet orally once a day.  2.atorvastatin 10 mg tablet, Take 1 tablet orally once a day.  3.donepeziL 10 mg tablet, Take 1 tablet orally once a day.  4.enalapril maleate 20 mg tablet, Take 1 tablet orally once a day  5.fluticasone propionate 50 mcg/actuation nasal spray,suspension, Take 5 mL (intranasal) once a day.  6.memantine 5 mg tablet, Take 1 tablet orally 2 times a day.  7.Xarelto 20 mg tablet, Take 1 tablet orally once a day.  8.rosuvastatin 10 mg tablet, Take 1 tablet orally once a day.  Impression  1.Obesity, Class I, BMI 30.0-34.9  2.Venous insufficiency of both lower extremities  3.H/O hyperlipidemia  4.Hypertension (HTN), primary  5.Chronic a-fib  Assessment & Plan  Permanent atrial fibrillation  - Doing well, asymptomatic  - Continue atenolol 100 mg daily, currently rate controlled  - Continue Xarelto 20 mg daily    HTN  - Normotensive  - Continue enalapril 20 mg daily, atenolol 100 mg daily    HLD  - , triglycerides 102 on 7/18/2023  - Previously was on atorvastatin 10 mg daily with an LDL below 100, unclear why  this was discontinued  - We will restart him on rosuvastatin 10 mg daily  - Recheck lipid panel in 2 to 3 months    Bilateral lower extremity edema with venous stasis dermatitis  - Likely secondary to venous insufficiency, currently asymptomatic  - Discussed possible venous ultrasound however patient feels symptoms are fairly minimal and responding to conservative therapy, therefore will defer further testing at this time  - If he develops more symptoms we can certainly proceed with an ultrasound and referral to vein specialist    Plan  Rosuvastatin 10 mg daily  Lipid panel in 2 to 3 months  Follow-up with me in 1 year or sooner as needed  Miscellaneous  1.Weight monitoring (regime/therapy)  Nurses documentation  refills: None   Assistive devices: none  Upcoming sx or procedures: none   Patient instructions  Plan  Rosuvastatin 10 mg daily  Lipid panel in 2 to 3 months  Follow-up with me in 1 year or sooner as needed  Care Providers: Joe Borges MD, Poly LUNDBERG and Chrissy LUNDBERG  Electronically Authenticated by  Joe Borges MD  01/09/2024 07:17:45 AM  Disclaimer: Components of this note were documented using voice recognition system and are subject to errors not corrected at proofreading. Contact the author of this note for any clarifications.

## 2024-08-16 ENCOUNTER — TELEPHONE (OUTPATIENT)
Facility: CLINIC | Age: 84
End: 2024-08-16

## 2024-08-16 ENCOUNTER — APPOINTMENT (OUTPATIENT)
Dept: CV DIAGNOSTICS | Facility: HOSPITAL | Age: 84
End: 2024-08-16
Attending: HOSPITALIST
Payer: MEDICARE

## 2024-08-16 ENCOUNTER — ANESTHESIA (OUTPATIENT)
Dept: ENDOSCOPY | Facility: HOSPITAL | Age: 84
End: 2024-08-16
Payer: MEDICARE

## 2024-08-16 ENCOUNTER — ANESTHESIA EVENT (OUTPATIENT)
Dept: ENDOSCOPY | Facility: HOSPITAL | Age: 84
End: 2024-08-16
Payer: MEDICARE

## 2024-08-16 LAB
ANION GAP SERPL CALC-SCNC: 7 MMOL/L (ref 0–18)
BASOPHILS # BLD AUTO: 0.03 X10(3) UL (ref 0–0.2)
BASOPHILS NFR BLD AUTO: 0.6 %
BLOOD TYPE BARCODE: 5100
BUN BLD-MCNC: 20 MG/DL (ref 9–23)
BUN/CREAT SERPL: 20.2 (ref 10–20)
CALCIUM BLD-MCNC: 9.1 MG/DL (ref 8.7–10.4)
CHLORIDE SERPL-SCNC: 109 MMOL/L (ref 98–112)
CO2 SERPL-SCNC: 30 MMOL/L (ref 21–32)
CREAT BLD-MCNC: 0.99 MG/DL
DEPRECATED RDW RBC AUTO: 53.8 FL (ref 35.1–46.3)
EGFRCR SERPLBLD CKD-EPI 2021: 75 ML/MIN/1.73M2 (ref 60–?)
EOSINOPHIL # BLD AUTO: 0.08 X10(3) UL (ref 0–0.7)
EOSINOPHIL NFR BLD AUTO: 1.5 %
ERYTHROCYTE [DISTWIDTH] IN BLOOD BY AUTOMATED COUNT: 24 % (ref 11–15)
GLUCOSE BLD-MCNC: 95 MG/DL (ref 70–99)
HCT VFR BLD AUTO: 22.9 %
HCT VFR BLD AUTO: 27.4 %
HGB BLD-MCNC: 6.6 G/DL
HGB BLD-MCNC: 7.9 G/DL
IMM GRANULOCYTES # BLD AUTO: 0.02 X10(3) UL (ref 0–1)
IMM GRANULOCYTES NFR BLD: 0.4 %
LYMPHOCYTES # BLD AUTO: 1.64 X10(3) UL (ref 1–4)
LYMPHOCYTES NFR BLD AUTO: 31.1 %
MCH RBC QN AUTO: 18.8 PG (ref 26–34)
MCHC RBC AUTO-ENTMCNC: 28.8 G/DL (ref 31–37)
MCV RBC AUTO: 65.1 FL
MONOCYTES # BLD AUTO: 0.64 X10(3) UL (ref 0.1–1)
MONOCYTES NFR BLD AUTO: 12.1 %
NEUTROPHILS # BLD AUTO: 2.86 X10 (3) UL (ref 1.5–7.7)
NEUTROPHILS # BLD AUTO: 2.86 X10(3) UL (ref 1.5–7.7)
NEUTROPHILS NFR BLD AUTO: 54.3 %
OSMOLALITY SERPL CALC.SUM OF ELEC: 304 MOSM/KG (ref 275–295)
PLATELET # BLD AUTO: 164 10(3)UL (ref 150–450)
PLATELET MORPHOLOGY: NORMAL
PLATELETS.RETICULATED NFR BLD AUTO: 4.7 % (ref 0–7)
POTASSIUM SERPL-SCNC: 3.2 MMOL/L (ref 3.5–5.1)
RBC # BLD AUTO: 3.52 X10(6)UL
SODIUM SERPL-SCNC: 146 MMOL/L (ref 136–145)
UNIT VOLUME: 350 ML
WBC # BLD AUTO: 5.3 X10(3) UL (ref 4–11)

## 2024-08-16 PROCEDURE — 93306 TTE W/DOPPLER COMPLETE: CPT | Performed by: HOSPITALIST

## 2024-08-16 PROCEDURE — 0DJD8ZZ INSPECTION OF LOWER INTESTINAL TRACT, VIA NATURAL OR ARTIFICIAL OPENING ENDOSCOPIC: ICD-10-PCS | Performed by: STUDENT IN AN ORGANIZED HEALTH CARE EDUCATION/TRAINING PROGRAM

## 2024-08-16 PROCEDURE — 99233 SBSQ HOSP IP/OBS HIGH 50: CPT | Performed by: HOSPITALIST

## 2024-08-16 PROCEDURE — 45380 COLONOSCOPY AND BIOPSY: CPT | Performed by: STUDENT IN AN ORGANIZED HEALTH CARE EDUCATION/TRAINING PROGRAM

## 2024-08-16 RX ORDER — HYDROCORTISONE 25 MG/G
1 CREAM TOPICAL DAILY PRN
Status: DISCONTINUED | OUTPATIENT
Start: 2024-08-16 | End: 2024-08-19

## 2024-08-16 RX ORDER — POTASSIUM CHLORIDE 1500 MG/1
40 TABLET, EXTENDED RELEASE ORAL ONCE
Status: COMPLETED | OUTPATIENT
Start: 2024-08-16 | End: 2024-08-16

## 2024-08-16 RX ORDER — SODIUM CHLORIDE, SODIUM LACTATE, POTASSIUM CHLORIDE, CALCIUM CHLORIDE 600; 310; 30; 20 MG/100ML; MG/100ML; MG/100ML; MG/100ML
INJECTION, SOLUTION INTRAVENOUS CONTINUOUS PRN
Status: DISCONTINUED | OUTPATIENT
Start: 2024-08-16 | End: 2024-08-16 | Stop reason: SURG

## 2024-08-16 RX ORDER — ATENOLOL 25 MG/1
100 TABLET ORAL
Status: DISCONTINUED | OUTPATIENT
Start: 2024-08-17 | End: 2024-08-17

## 2024-08-16 RX ORDER — SODIUM CHLORIDE 9 MG/ML
INJECTION, SOLUTION INTRAVENOUS ONCE
Status: COMPLETED | OUTPATIENT
Start: 2024-08-16 | End: 2024-08-16

## 2024-08-16 RX ORDER — PROPYLTHIOURACIL 50 MG/1
100 TABLET ORAL 3 TIMES DAILY
Status: DISCONTINUED | OUTPATIENT
Start: 2024-08-16 | End: 2024-08-17

## 2024-08-16 RX ORDER — ROSUVASTATIN CALCIUM 10 MG/1
10 TABLET, COATED ORAL NIGHTLY
Status: DISCONTINUED | OUTPATIENT
Start: 2024-08-16 | End: 2024-08-22

## 2024-08-16 RX ORDER — RISPERIDONE 0.25 MG/1
0.5 TABLET ORAL NIGHTLY
Status: DISCONTINUED | OUTPATIENT
Start: 2024-08-16 | End: 2024-08-22

## 2024-08-16 RX ADMIN — SODIUM CHLORIDE, SODIUM LACTATE, POTASSIUM CHLORIDE, CALCIUM CHLORIDE: 600; 310; 30; 20 INJECTION, SOLUTION INTRAVENOUS at 15:07:00

## 2024-08-16 NOTE — PROGRESS NOTES
[facility]  part of [facility]    Progress Note    Itzel De Leon Patient Status:  Inpatient    1940 MRN [id number]   Location [facility] 5SW/SE Attending Riccardo Roblero MD   Hosp Day # 1 PCP Mehrdad Solano,        Subjective:   Itzel De Leon is a(n) 83 year old male was seen and examined  Daughter at bedside, translating  No cp, sob, f,c,v abd pain or HA     Objective:   Blood pressure 119/63, pulse 73, temperature 98.7 °F (37.1 °C), temperature source Oral, resp. rate 18, height 5' 6\" (1.676 m), weight 215 lb 8 oz (97.8 kg), SpO2 98%.    GENERAL:  Alert and oriented to time, place, and person.  Fatigued.  Does not appear to be in distress.    VITAL SIGNS:  Temperature 98.4, pulse 72, respiratory rate 15, blood pressure 120/69, pulse ox 99% on room air.  HEENT:  Atraumatic.  Oropharynx clear.  Moist mucous membranes.    NECK:  Supple.  No lymphadenopathy.  Jugular venous distention and pulsation noted.  LUNGS:  Diminished breathing sounds, both lung bases.  HEART:  Irregular rhythm.  S1 and S2 auscultated.  Rate controlled on monitor.  ABDOMEN:  Soft, obese.  Positive bowel sounds.  No distention, no tenderness.  EXTREMITIES:  Edema +2 both legs.  No clubbing or cyanosis.    NEUROLOGIC: Motor and sensory intact.     Current Inpatient Medications:     Current Facility-Administered Medications:     [START ON 2024] atenolol (Tenormin) tab 100 mg, 100 mg, Oral, Daily Beta Blocker    pantoprazole (Protonix) 40 mg in sodium chloride 0.9% PF 10 mL IV push, 40 mg, Intravenous, Daily    furosemide (Lasix) 10 mg/mL injection 40 mg, 40 mg, Intravenous, BID (Diuretic)    acetaminophen (Tylenol Extra Strength) tab 500 mg, 500 mg, Oral, Q4H PRN    ondansetron (Zofran) 4 MG/2ML injection 4 mg, 4 mg, Intravenous, Q6H PRN    metoclopramide (Reglan) 5 mg/mL injection 10 mg, 10 mg, Intravenous, Q8H PRN    temazepam (Restoril) cap 15 mg, 15 mg, Oral, Nightly PRN    Assessment and Plan:    1.       Gastrointestinal bleed.  GI has been consulted   Of note pt with hx of hemorrhoids   Will undergo endoscopic eval today  Cont to monitor Hgb    2.       Posthemorrhagic microcytic anemia  Cont to transfuse to get Hgb >7  Cont to monitor    3.       Fluid overload status.  Cont IV lasix for now  Due to HFrEF, evident on echo  Cardiology following  Cont strict IsOS  Net IO Since Admission: 1,056.17 mL [08/16/24 2243]     4.       Chronic atrial fibrillation.  AC on hold  Rates controlled currently  Cont tele monitoring    5.       Hyperthyroidism  Cont PTU  Will recheck TSH    6.        HTN  Stable for now  Cont to monitor    DVT Ppx: none due to beed  Full code    MDM: High       Results:     Recent Labs   Lab 08/15/24  1428 08/15/24  2111 08/16/24  0524   RBC 3.68*  --  3.52*   HGB 6.3* 7.6* 6.6*   HCT 23.5* 26.6* 22.9*   MCV 63.9*  --  65.1*   MCH 17.1*  --  18.8*   MCHC 26.8*  --  28.8*   RDW 21.9*  --  24.0*   NEPRELIM 2.45  --  2.86   WBC 4.5  --  5.3   .0  --  164.0         Recent Labs   Lab 08/15/24  1429 08/16/24  0524   * 95   BUN 21 20   CREATSERUM 1.03 0.99   EGFRCR 72 75   CA 9.4 9.1    146*   K 3.8 3.2*    109   CO2 27.0 30.0         Imaging:   XR CHEST AP PORTABLE  (CPT=71045)    Result Date: 8/15/2024  CONCLUSION:  1. Cardiomegaly and prominent central vasculature. 2. Prominent basilar pulmonary markings.  No acute pulmonary disease.    Dictated by (CST): Rob Isaac MD on 8/15/2024 at 2:13 PM     Finalized by (CST): Rob Isaac MD on 8/15/2024 at 2:14 PM         EKG 12 Lead    Result Date: 8/15/2024  Atrial fibrillation with premature ventricular or aberrantly conducted complexes Left axis deviation Nonspecific ST abnormality Abnormal ECG No previous ECGs found in [facility] Confirmed by JESSE MOSES, WALLACE ([id number]) on 8/15/2024 3:40:22 PM       Riccardo Roblero MD  8/16/2024

## 2024-08-16 NOTE — DIETARY NOTE
Dietitian Note     Received consult for \"heart failure diet education.\" Not appropriate at this time considering pt's current clinical status. Also no advanced age of 83. Will clear consult and remain available for further nutrition interventions as needed.       Fay Sarmiento RD, LDN  Clinical Dietitian  P: [phone]

## 2024-08-16 NOTE — PLAN OF CARE
Problem: Patient Centered Care  Goal: Patient preferences are identified and integrated in the patient's plan of care  Description: Interventions:  - What would you like us to know as we care for you?   - Provide timely, complete, and accurate information to patient/family  - Incorporate patient and family knowledge, values, beliefs, and cultural backgrounds into the planning and delivery of care  - Encourage patient/family to participate in care and decision-making at the level they choose  - Honor patient and family perspectives and choices  8/16/2024 0404 by Niesha Munoz RN  Outcome: Progressing  8/15/2024 2210 by Niesha Munoz RN  Outcome: Progressing     Problem: Patient/Family Goals  Goal: Patient/Family Long Term Goal  Description: Patient's Long Term Goal:     Interventions:  -   - See additional Care Plan goals for specific interventions  8/16/2024 0404 by Niesha Munoz RN  Outcome: Progressing  8/15/2024 2210 by Niesha Munoz RN  Outcome: Progressing  Goal: Patient/Family Short Term Goal  Description: Patient's Short Term Goal:     Interventions:   -   - See additional Care Plan goals for specific interventions  8/16/2024 0404 by Niesha Munoz RN  Outcome: Progressing  8/15/2024 2210 by Niesha Munoz RN  Outcome: Progressing     Problem: SAFETY ADULT - FALL  Goal: Free from fall injury  Description: INTERVENTIONS:  - Assess pt frequently for physical needs  - Identify cognitive and physical deficits and behaviors that affect risk of falls.  - [place] fall precautions as indicated by assessment.  - Educate pt/family on patient safety including physical limitations  - Instruct pt to call for assistance with activity based on assessment  - Modify environment to reduce risk of injury  - Provide assistive devices as appropriate  - Consider OT/PT consult to assist with strengthening/mobility  - Encourage toileting schedule  Outcome: Progressing     Problem: Altered  Communication/Language Barrier  Goal: Patient/Family is able to understand and participate in their care  Description: Interventions:  - Assess communication ability and preferred communication style  - Implement communication aides and strategies  - Use visual cues when possible  - Listen attentively, be patient, do not interrupt  - Minimize distractions  - Allow time for understanding and response  - Establish method for patient to ask for assistance (call light)  - Provide an  as needed  - Communicate barriers and strategies to overcome with those who interact with patient  Outcome: Progressing     Problem: GASTROINTESTINAL - ADULT  Goal: Maintains or returns to baseline bowel function  Description: INTERVENTIONS:  - Assess bowel function  - Maintain adequate hydration with IV or PO as ordered and tolerated  - Evaluate effectiveness of GI medications  - Encourage mobilization and activity  - Obtain nutritional consult as needed  - Establish a toileting routine/schedule  - Consider collaborating with pharmacy to review patient's medication profile  Outcome: Progressing     Problem: HEMATOLOGIC - ADULT  Goal: Maintains hematologic stability  Description: INTERVENTIONS  - Assess for signs and symptoms of bleeding or hemorrhage  - Monitor labs and vital signs for trends  - Administer supportive blood products/factors, fluids and medications as ordered and appropriate  - Administer supportive blood products/factors as ordered and appropriate  Outcome: Progressing  Goal: Free from bleeding injury  Description: (Example usage: patient with low platelets)  INTERVENTIONS:  - Avoid intramuscular injections, enemas and rectal medication administration  - Ensure safe mobilization of patient  - Hold pressure on venipuncture sites to achieve adequate hemostasis  - Assess for signs and symptoms of internal bleeding  - Monitor lab trends  - Patient is to report abnormal signs of bleeding to staff  - Avoid use of  toothpicks and dental floss  - Use electric shaver for shaving  - Use soft bristle tooth brush  - Limit straining and forceful nose blowing  Outcome: Progressing

## 2024-08-16 NOTE — PLAN OF CARE
Patient alert and oriented times 4, patient vitals normal limit, patient receiving 1 unit of blood.  Problem: Patient Centered Care  Goal: Patient preferences are identified and integrated in the patient's plan of care  Description: Interventions:  - What would you like us to know as we care for you?   - Provide timely, complete, and accurate information to patient/family  - Incorporate patient and family knowledge, values, beliefs, and cultural backgrounds into the planning and delivery of care  - Encourage patient/family to participate in care and decision-making at the level they choose  - Honor patient and family perspectives and choices  Outcome: Progressing     Problem: Patient/Family Goals  Goal: Patient/Family Long Term Goal  Description: Patient's Long Term Goal:     Interventions:    - See additional Care Plan goals for specific interventions  Outcome: Progressing  Goal: Patient/Family Short Term Goal  Description: Patient's Short Term Goal:     Interventions:     - See additional Care Plan goals for specific interventions  Outcome: Progressing     Problem: SAFETY ADULT - FALL  Goal: Free from fall injury  Description: INTERVENTIONS:  - Assess pt frequently for physical needs  - Identify cognitive and physical deficits and behaviors that affect risk of falls.  - [place] fall precautions as indicated by assessment.  - Educate pt/family on patient safety including physical limitations  - Instruct pt to call for assistance with activity based on assessment  - Modify environment to reduce risk of injury  - Provide assistive devices as appropriate  - Consider OT/PT consult to assist with strengthening/mobility  - Encourage toileting schedule  Outcome: Progressing     Problem: Altered Communication/Language Barrier  Goal: Patient/Family is able to understand and participate in their care  Description: Interventions:  - Assess communication ability and preferred communication style  - Implement communication  aides and strategies  - Use visual cues when possible  - Listen attentively, be patient, do not interrupt  - Minimize distractions  - Allow time for understanding and response  - Establish method for patient to ask for assistance (call light)  - Provide an  as needed  - Communicate barriers and strategies to overcome with those who interact with patient  Outcome: Progressing     Problem: GASTROINTESTINAL - ADULT  Goal: Maintains or returns to baseline bowel function  Description: INTERVENTIONS:  - Assess bowel function  - Maintain adequate hydration with IV or PO as ordered and tolerated  - Evaluate effectiveness of GI medications  - Encourage mobilization and activity  - Obtain nutritional consult as needed  - Establish a toileting routine/schedule  - Consider collaborating with pharmacy to review patient's medication profile  Outcome: Progressing     Problem: HEMATOLOGIC - ADULT  Goal: Maintains hematologic stability  Description: INTERVENTIONS  - Assess for signs and symptoms of bleeding or hemorrhage  - Monitor labs and vital signs for trends  - Administer supportive blood products/factors, fluids and medications as ordered and appropriate  - Administer supportive blood products/factors as ordered and appropriate  Outcome: Progressing  Goal: Free from bleeding injury  Description: (Example usage: patient with low platelets)  INTERVENTIONS:  - Avoid intramuscular injections, enemas and rectal medication administration  - Ensure safe mobilization of patient  - Hold pressure on venipuncture sites to achieve adequate hemostasis  - Assess for signs and symptoms of internal bleeding  - Monitor lab trends  - Patient is to report abnormal signs of bleeding to staff  - Avoid use of toothpicks and dental floss  - Use electric shaver for shaving  - Use soft bristle tooth brush  - Limit straining and forceful nose blowing  Outcome: Progressing

## 2024-08-16 NOTE — CARDIAC REHAB
Order received and Chart review completed, followed by assessment as to appropriateness of patient to receive Cardiac Rehab. Patient is not appropriate for cardiac rehab intervention at this time.  If further intervention is needed, please resubmit cardiac rehab consult order.

## 2024-08-16 NOTE — ANESTHESIA POSTPROCEDURE EVALUATION
Patient: Itzel De Leon    Procedure Summary       Date: 08/16/24 Room / Location: [facility] ENDOSCOPY 04 / [facility] ENDOSCOPY    Anesthesia Start: 1507 Anesthesia Stop: 1540    Procedure: COLONOSCOPY Diagnosis: (Hemorrhoids)    Surgeons: Kaiser Robles MD Anesthesiologist: Fito Messina DO    Anesthesia Type: MAC ASA Status: 3            Anesthesia Type: MAC    Vitals Value Taken Time   /74 08/16/24 1540   Temp 97.8 08/16/24 1540   Pulse 73 08/16/24 1540   Resp 12 08/16/24 1540   SpO2 99% 08/16/24 1540       [facility] AN Post Evaluation:   Patient Evaluated in PACU  Patient Participation: complete - patient participated  Level of Consciousness: sleepy but conscious  Pain Management: adequate  Airway Patency:patent  Yes    Nausea/Vomiting: none  Cardiovascular Status: acceptable  Respiratory Status: acceptable  Postoperative Hydration acceptable      Fito Messina DO  8/16/2024 3:40 PM

## 2024-08-16 NOTE — ANESTHESIA PREPROCEDURE EVALUATION
Anesthesia PreOp Note    83 year old M PMHx HTN, Afib on Xarelto, hyperthyroidism, Alzheimer's disease, admitted with GI bleed; presenting for colonoscopy.    HPI:     Itzel De Leon is a 83 year old male who presents for preoperative consultation requested by: Kaisre Robles MD    Date of Surgery: 8/15/2024 - 8/16/2024    Procedure(s):  COLONOSCOPY  Indication: anemia,bright red blood per rectum    Relevant Problems   No relevant active problems       NPO:  Last Liquid Consumption Date: 08/16/24  Last Liquid Consumption Time: 0200  Last Solid Consumption Date: 08/15/24  Last Solid Consumption Time: 1300  Last Liquid Consumption Date: 08/16/24          History Review:  Patient Active Problem List    Diagnosis Date Noted    Gastrointestinal hemorrhage, unspecified gastrointestinal hemorrhage type 08/15/2024    Severe anemia 08/15/2024    GI bleed 08/15/2024    Alzheimer's disease, unspecified (HCC) 04/28/2022    Thrombocytopenia (HCC) 04/28/2022    Pure hypercholesterolemia 12/17/2019    Edema 12/04/2017    Age-related nuclear cataract of left eye 11/29/2016    Atrial fibrillation (HCC) 09/26/2016    Hyperthyroidism 12/14/2015       Past Medical History:    Age-related nuclear cataract of both eyes    Arrhythmia    Asteroid hyalosis of left eye    Atrial fibrillation (HCC)    Disorder of thyroid    Essential hypertension    High blood pressure    Hypertension    Hyperthyroidism    Meibomian gland dysfunction (MGD) of upper and lower lids of both eyes    Neck mass    left-FNA    Unspecified essential hypertension       Past Surgical History:   Procedure Laterality Date    Cataract extraction w/  intraocular lens implant Right 02/15/2016    RJM; significant anxiety in OR, was on Propofol Drip; suggest general anesthesia for OS     Colonoscopy N/A 08/16/2024    ;    Electrocardiogram, complete  07/17/2012    scanned to media tab, 07-       Medications Prior to Admission   Medication Sig Dispense  Refill Last Dose    propylthiouracil 50 MG Oral Tab Take 2 tablets (100 mg total) by mouth 3 (three) times daily. 540 tablet 3 8/15/2024    rosuvastatin 10 MG Oral Tab rosuvastatin 10 mg tablet, [RxNorm: 848940]   8/15/2024    hydrocortisone (PROCTO-MED HC) 2.5 % External Cream Place 1 Application rectally daily as needed for Hemorrhoids. 45 g 1 8/14/2024    furosemide 20 MG Oral Tab Take 2 tablets (40 mg total) by mouth daily. 180 tablet 3 8/15/2024    enalapril 20 MG Oral Tab Take 1 tablet (20 mg total) by mouth daily. 90 tablet 1 8/15/2024    Rivaroxaban (XARELTO) 20 MG Oral Tab Take 1 tablet (20 mg total) by mouth daily with food. 30 tablet 0 8/14/2024    risperiDONE 0.5 MG Oral Tab    8/14/2024    atenolol 100 MG Oral Tab Take 1 tablet (100 mg total) by mouth daily. (Patient not taking: Reported on 8/15/2024) 90 tablet 3 Not Taking     Current Facility-Administered Medications Ordered in Epic   Medication Dose Route Frequency Provider Last Rate Last Admin    [START ON 8/17/2024] atenolol (Tenormin) tab 100 mg  100 mg Oral Daily Beta Blocker Jayna Bautista APN        pantoprazole (Protonix) 40 mg in sodium chloride 0.9% PF 10 mL IV push  40 mg Intravenous Daily Aimee Carl MD   40 mg at 08/16/24 0925    furosemide (Lasix) 10 mg/mL injection 40 mg  40 mg Intravenous BID (Diuretic) Aimee Carl MD   40 mg at 08/16/24 0924    acetaminophen (Tylenol Extra Strength) tab 500 mg  500 mg Oral Q4H PRN Aimee Carl MD        ondansetron (Zofran) 4 MG/2ML injection 4 mg  4 mg Intravenous Q6H PRN Aimee Carl MD        metoclopramide (Reglan) 5 mg/mL injection 10 mg  10 mg Intravenous Q8H PRN Aimee Carl MD        temazepam (Restoril) cap 15 mg  15 mg Oral Nightly PRN Aimee Carl MD         No current [facility]-ordered outpatient medications on file.       No Known Allergies    Family History   Problem Relation Age of Onset    Cancer Father         lung    Diabetes Neg     Glaucoma Neg     Macular  degeneration Neg      Social History     Socioeconomic History    Marital status:    Tobacco Use    Smoking status: Never     Passive exposure: Never    Smokeless tobacco: Never   Vaping Use    Vaping status: Never Used   Substance and Sexual Activity    Alcohol use: No    Drug use: No   Other Topics Concern    Caffeine Concern Yes     Comment: coffee, 2 cups daily       Available pre-op labs reviewed.  Lab Results   Component Value Date    WBC 5.3 08/16/2024    RBC 3.52 (L) 08/16/2024    HGB 7.9 (L) 08/16/2024    HCT 27.4 (L) 08/16/2024    MCV 65.1 (L) 08/16/2024    MCH 18.8 (L) 08/16/2024    MCHC 28.8 (L) 08/16/2024    RDW 24.0 (H) 08/16/2024    .0 08/16/2024     Lab Results   Component Value Date     (H) 08/16/2024    K 3.2 (L) 08/16/2024     08/16/2024    CO2 30.0 08/16/2024    BUN 20 08/16/2024    CREATSERUM 0.99 08/16/2024    GLU 95 08/16/2024    CA 9.1 08/16/2024          Vital Signs:  Body mass index is 34.78 kg/m².   height is 1.676 m (5' 6\") and weight is 97.8 kg (215 lb 8 oz). His oral temperature is 98.7 °F (37.1 °C). His blood pressure is 119/63 and his pulse is 73. His respiration is 18 and oxygen saturation is 98%.   Vitals:    08/16/24 0950 08/16/24 1005 08/16/24 1105 08/16/24 1205   BP: 135/73 (!) 144/91 123/81 119/63   Pulse:       Resp: 16 18 18 18   Temp: 97.9 °F (36.6 °C) 97.9 °F (36.6 °C) 98 °F (36.7 °C) 98.7 °F (37.1 °C)   TempSrc: Oral Oral Oral Oral   SpO2: 97% 98% 98% 98%   Weight:       Height:            Anesthesia Evaluation      No history of anesthetic complications   Airway   Mallampati: III  TM distance: >3 FB  Neck ROM: full  Dental    (+) upper dentures and lower dentures    Comment: Edentulous    Risks of oral and dental damage including but not limited to cut/bloody lips or gums, damage to or dislodgment of native teeth or prosthetics/implants discussed preoperatively, with patient expressing understanding and desire to proceed with anesthetic. Patient  denies knowledge of any additional damage/disease/weakness of teeth not otherwise documented in pre-anesthetic evaluation.      Pulmonary - negative ROS and normal exam   (-) COPD, asthma, recent URI, sleep apnea  Cardiovascular   Exercise tolerance: good  (+) hypertension, dysrhythmias (Afib)    Rhythm: irregular  Rate: normal  PE comment: Afib    Neuro/Psych    (-) seizures, TIA, CVA    Comments: Alzheimer's disease    GI/Hepatic/Renal    (-) GERD, liver disease, renal disease    Comments: GI bleed    Endo/Other    (+) hyperthyroidism  (-) diabetes mellitus, hypothyroidism    Comments: BMI 34  Abdominal   (+) obese                 Anesthesia Plan:   ASA:  3  Plan:   MAC  Informed Consent Plan and Risks Discussed With:  Patient and  (Jose  Leonel [id number])      I have informed Itzel D eLeon and/or legal guardian or family member of the nature of the anesthetic plan, benefits, risks including possible dental damage if relevant, major complications, and any alternative forms of anesthetic management.   All of the patient's questions were answered to the best of my ability. The patient desires the anesthetic management as planned.  Quique Boothe MD  8/16/2024 2:23 PM  Present on Admission:  **None**

## 2024-08-16 NOTE — PROGRESS NOTES
[facility] Cardiology Progress Note    Itzel De Leon Patient Status:  Inpatient    1940 MRN [id number]   Location [facility] 5SW/SE Attending Riccardo Roblero MD   Hosp Day # 1 PCP Mehrdad Solano, DO     Subjective:  Denies cp, sob.   Getting 1 unit PRBC  Family at bedside.  used     Objective:  /81 (BP Location: Right arm)   Pulse 73   Temp 98 °F (36.7 °C) (Oral)   Resp 18   Ht 167.6 cm (5' 6\")   Wt 215 lb 8 oz (97.8 kg)   SpO2 98%   BMI 34.78 kg/m²     Telemetry: Afib       Intake/Output:    Intake/Output Summary (Last 24 hours) at 2024 1157  Last data filed at 2024 0934  Gross per 24 hour   Intake 736.17 ml   Output 200 ml   Net 536.17 ml       Last 3 Weights   24 0513 215 lb 8 oz (97.8 kg)   08/15/24 1601 219 lb 8 oz (99.6 kg)   08/15/24 1307 220 lb (99.8 kg)   24 1146 221 lb (100.2 kg)   24 1654 222 lb (100.7 kg)       Labs:  Recent Labs   Lab 08/15/24  1429 24  0524   * 95   BUN 21 20   CREATSERUM 1.03 0.99   EGFRCR 72 75   CA 9.4 9.1    146*   K 3.8 3.2*    109   CO2 27.0 30.0     Recent Labs   Lab 08/15/24  1428 08/15/24  2111 08/16/24  0524   RBC 3.68*  --  3.52*   HGB 6.3* 7.6* 6.6*   HCT 23.5* 26.6* 22.9*   MCV 63.9*  --  65.1*   MCH 17.1*  --  18.8*   MCHC 26.8*  --  28.8*   RDW 21.9*  --  24.0*   NEPRELIM 2.45  --  2.86   WBC 4.5  --  5.3   .0  --  164.0         Recent Labs   Lab 08/15/24  1429   TROPHS 9       Diagnostics:     24 Echo  1. Left ventricle: The cavity size was normal. Wall thickness was mildly increased. Systolic function was mildly reduced. The estimated ejection fraction was 45-50%, by biplane method of disks. No diagnostic evidence for regional wall motion abnormalities. Unable to assess LV diastolic function due to heart rhythm.   2. Right ventricle: The cavity size was increased. Systolic function was normal.   3. Left atrium: The atrium was markedly dilated.   4. Right  atrium: The atrium was dilated.   5. Aortic valve: The annulus was calcified. The valve was trileaflet. The leaflets were mildly thickened.   6. Mitral valve: There was mild regurgitation.   7. Tricuspid valve: There was moderate-severe regurgitation.       Review of Systems   Respiratory: Negative.     Cardiovascular: Negative.        Physical Exam:    General: Alert and oriented x 3. No apparent distress.   HEENT: Normocephalic, anicteric sclera, neck supple, no thyromegaly or adenopathy.  Neck: No JVD, carotids 2+, no bruits.  Cardiac: Irregularly irregular rate & rhythm. S1, S2 normal. No murmur, pericardial rub, S3, or extra cardiac sounds.  Lungs: Clear without wheezes, rales, rhonchi or dullness.  Normal excursions and effort.  Abdomen: Soft, non-tender. No organosplenomegally, mass or rebound, BS-present.  Extremities: Without clubbing or cyanosis.  + Mild BLE edema w/   Neurologic: Alert and oriented, normal affect. No focal defects  Skin: Warm and dry.       Medications:   pantoprazole  40 mg Intravenous Daily    furosemide  40 mg Intravenous BID (Diuretic)         Assessment:    GIB / anemia / melena   - Stable from cardiac standpoint for GI w/u   - Pending colonoscopy today   - On PPI   - GI following     Chronic afib   - Rate controlled   - Xarelto held due to GIB as above   - On atenolol 100mg daily at home    HTN   - BP at goal. On enalapril at home     HLD   - Statin therapy     Chronic BLE edema   - Likely 2/2 venous insufficiency in the past   -    - On IV lasix     Plan:    Echo w/ EF 45-50% (50% in 2016), no wma, mild MR, mod-severe TR   Compensated on exam. No CV concerns   In rate controlled afib . Resume home atenolol 100mg daily . Xarelto on hold till ok with GI   Blood pressure at goal. Home enalapril on hold   Mild LE edema . Scr stable. Continue IVP lasix  Potassium replenished per protocol . Keep k > 4 , Mg > 2   Colonoscopy today     CORBIN James  8/16/2024  11:57 AM  Ph  [phone] ([place])  Ph [phone] ([place])      L3  Seen and examined bedside. Agree with the present management, will follow with you.    Pending GI workup for anemia.  Hemoglobin 6.6 today.  Continues to drop.   Compensated on exam EF 45 to 50%.  Resume atenolol home dose 100 mg daily.  Xarelto on hold pending GI evaluation.  Continue IV Lasix mild edema on exam 40 mg IV twice a day.  Thank you for allowing me to participate in the care of your patient, feel free to contact me if you have any questions.    Fernando Hodge MD  [facility] [place]  Interventional Cardiology.  [phone]

## 2024-08-17 LAB
ALBUMIN SERPL-MCNC: 3.9 G/DL (ref 3.2–4.8)
ANION GAP SERPL CALC-SCNC: 6 MMOL/L (ref 0–18)
BASOPHILS # BLD AUTO: 0.03 X10(3) UL (ref 0–0.2)
BASOPHILS NFR BLD AUTO: 0.6 %
BLOOD TYPE BARCODE: 5100
BUN BLD-MCNC: 20 MG/DL (ref 9–23)
BUN/CREAT SERPL: 20.8 (ref 10–20)
CALCIUM BLD-MCNC: 8.8 MG/DL (ref 8.7–10.4)
CHLORIDE SERPL-SCNC: 107 MMOL/L (ref 98–112)
CHOLEST SERPL-MCNC: 121 MG/DL (ref ?–200)
CO2 SERPL-SCNC: 31 MMOL/L (ref 21–32)
CREAT BLD-MCNC: 0.96 MG/DL
DEPRECATED RDW RBC AUTO: 61.6 FL (ref 35.1–46.3)
EGFRCR SERPLBLD CKD-EPI 2021: 78 ML/MIN/1.73M2 (ref 60–?)
EOSINOPHIL # BLD AUTO: 0.07 X10(3) UL (ref 0–0.7)
EOSINOPHIL NFR BLD AUTO: 1.4 %
ERYTHROCYTE [DISTWIDTH] IN BLOOD BY AUTOMATED COUNT: 25.8 % (ref 11–15)
GLUCOSE BLD-MCNC: 101 MG/DL (ref 70–99)
HCT VFR BLD AUTO: 25.8 %
HDLC SERPL-MCNC: 39 MG/DL (ref 40–59)
HGB BLD-MCNC: 7.5 G/DL
IMM GRANULOCYTES # BLD AUTO: 0.02 X10(3) UL (ref 0–1)
IMM GRANULOCYTES NFR BLD: 0.4 %
LDLC SERPL CALC-MCNC: 68 MG/DL (ref ?–100)
LYMPHOCYTES # BLD AUTO: 1.62 X10(3) UL (ref 1–4)
LYMPHOCYTES NFR BLD AUTO: 33.4 %
MAGNESIUM SERPL-MCNC: 1.9 MG/DL (ref 1.6–2.6)
MCH RBC QN AUTO: 19.6 PG (ref 26–34)
MCHC RBC AUTO-ENTMCNC: 29.1 G/DL (ref 31–37)
MCV RBC AUTO: 67.5 FL
MONOCYTES # BLD AUTO: 0.57 X10(3) UL (ref 0.1–1)
MONOCYTES NFR BLD AUTO: 11.8 %
NEUTROPHILS # BLD AUTO: 2.54 X10 (3) UL (ref 1.5–7.7)
NEUTROPHILS # BLD AUTO: 2.54 X10(3) UL (ref 1.5–7.7)
NEUTROPHILS NFR BLD AUTO: 52.4 %
NONHDLC SERPL-MCNC: 82 MG/DL (ref ?–130)
OSMOLALITY SERPL CALC.SUM OF ELEC: 301 MOSM/KG (ref 275–295)
PHOSPHATE SERPL-MCNC: 4.5 MG/DL (ref 2.4–5.1)
PLATELET # BLD AUTO: 169 10(3)UL (ref 150–450)
POTASSIUM SERPL-SCNC: 3.4 MMOL/L (ref 3.5–5.1)
POTASSIUM SERPL-SCNC: 3.4 MMOL/L (ref 3.5–5.1)
RBC # BLD AUTO: 3.82 X10(6)UL
SODIUM SERPL-SCNC: 144 MMOL/L (ref 136–145)
TRIGL SERPL-MCNC: 69 MG/DL (ref 30–149)
UNIT VOLUME: 350 ML
VLDLC SERPL CALC-MCNC: 10 MG/DL (ref 0–30)
WBC # BLD AUTO: 4.9 X10(3) UL (ref 4–11)

## 2024-08-17 PROCEDURE — 99233 SBSQ HOSP IP/OBS HIGH 50: CPT | Performed by: HOSPITALIST

## 2024-08-17 RX ORDER — LISINOPRIL 5 MG/1
5 TABLET ORAL DAILY
Status: DISCONTINUED | OUTPATIENT
Start: 2024-08-17 | End: 2024-08-22

## 2024-08-17 RX ORDER — PROPYLTHIOURACIL 50 MG/1
100 TABLET ORAL 3 TIMES DAILY
Status: DISCONTINUED | OUTPATIENT
Start: 2024-08-17 | End: 2024-08-22

## 2024-08-17 RX ORDER — POTASSIUM CHLORIDE 1.5 G/1.58G
40 POWDER, FOR SOLUTION ORAL ONCE
Status: COMPLETED | OUTPATIENT
Start: 2024-08-17 | End: 2024-08-17

## 2024-08-17 RX ORDER — FUROSEMIDE 20 MG
20 TABLET ORAL EVERY OTHER DAY
Status: DISCONTINUED | OUTPATIENT
Start: 2024-08-17 | End: 2024-08-22

## 2024-08-17 NOTE — PROGRESS NOTES
Progress Note  Itzel De Leon Patient Status:  Inpatient    1940 MRN [id number]   Location [facility] 5SW/SE Attending Riccardo Roblero MD   Hosp Day # 2 PCP Mehrdad Solano DO     Primary Cardiologist: Jony     SUBJECTIVE:    ROS completed with . Denies orthopnea, chest pain, decreased appetite. No bowel movements overnight.     VITALS:  /46 (BP Location: Left arm)   Pulse 68   Temp 98.2 °F (36.8 °C) (Oral)   Resp 16   Ht 5' 6\" (1.676 m)   Wt 215 lb 8 oz (97.8 kg)   SpO2 95%   BMI 34.78 kg/m²     INTAKE/OUTPUT:    Intake/Output Summary (Last 24 hours) at 2024 0657  Last data filed at 2024 1535  Gross per 24 hour   Intake 520 ml   Output 100 ml   Net 420 ml     Last 3 Weights   24 0513 215 lb 8 oz (97.8 kg)   08/15/24 1601 219 lb 8 oz (99.6 kg)   08/15/24 1307 220 lb (99.8 kg)   24 1146 221 lb (100.2 kg)   24 1654 222 lb (100.7 kg)       LABS:  Recent Labs   Lab 08/15/24  1429 24  0524 24  0511   * 95 101*   BUN 21 20 20   CREATSERUM 1.03 0.99 0.96   EGFRCR 72 75 78   CA 9.4 9.1 8.8    146* 144   K 3.8 3.2* 3.4*  3.4*    109 107   CO2 27.0 30.0 31.0     Recent Labs   Lab 08/15/24  1428 08/15/24  2111 24  0524 24  1258 24  0511   RBC 3.68*  --  3.52*  --  3.82   HGB 6.3*   < > 6.6* 7.9* 7.5*   HCT 23.5*   < > 22.9* 27.4* 25.8*   MCV 63.9*  --  65.1*  --  67.5*   MCH 17.1*  --  18.8*  --  19.6*   MCHC 26.8*  --  28.8*  --  29.1*   RDW 21.9*  --  24.0*  --  25.8*   NEPRELIM 2.45  --  2.86  --  2.54   WBC 4.5  --  5.3  --  4.9   .0  --  164.0  --  169.0    < > = values in this interval not displayed.     No results for input(s): \"TROP\", \"CK\" in the last 168 hours.    DIAGNOSTICS:    TELEMETRY: Afib, HR 50s-70s    ECHO 2024:   Conclusions:   1. Left ventricle: The cavity size was normal. Wall thickness was mildly      increased. Systolic function was mildly reduced. The estimated  ejection      fraction was 45-50%, by biplane method of disks. No diagnostic evidence      for regional wall motion abnormalities. Unable to assess LV diastolic      function due to heart rhythm.   2. Right ventricle: The cavity size was increased. Systolic function was      normal.   3. Left atrium: The atrium was markedly dilated.   4. Right atrium: The atrium was dilated.   5. Aortic valve: The annulus was calcified. The valve was trileaflet. The      leaflets were mildly thickened.   6. Mitral valve: There was mild regurgitation.   7. Tricuspid valve: There was moderate-severe regurgitation.     ROS: Negative unless noted above     PHYSICAL EXAM:  General: Alert and oriented x 3. No apparent distress.  HEENT: Normocephalic, sclera are nonicteric. Hearing appropriate bilaterally.  Neck: No JVD or Carotid bruits. Trachea midline.   Cardiac: IRRegular rate and rhythm. S1, S2 auscultated. No murmurs, rubs, or gallops appreciated.   Lungs: Clear without wheezes, rales, rhonchi or dullness. Chest expansion symmetrical. Regular effort.  Abdomen: Soft, non-tender, +BS. No hepatosplenomegaly or appreciable masses.   Extremities: Without clubbing, cyanosis or edema.  Peripheral pulses are 2+.  Neurologic: Motor and sensory nerves grossly intact.   Psych: Appropriate affect   Skin: Warm and dry. No obvious lesions, wounds, or ulcerations.     MEDICATIONS:   atenolol  100 mg Oral Daily Beta Blocker    propylthiouracil  100 mg Oral TID    risperiDONE  0.5 mg Oral Nightly    rosuvastatin  10 mg Oral Nightly    pantoprazole  40 mg Intravenous Daily    furosemide  40 mg Intravenous BID (Diuretic)     ASSESSMENT:    Acute Anemia w/ Hematochezia   - s/p CLN 8/16/24: Large prolapsed internal hemorrhoids with recent bleeding; GI recommends consult to surgery for more definite management of hemorrhoids   - Hgb 6.3 on admission, s/p 3 units PRBCs, Hgb today 7.5, Baseline ~ 11-13    Permanent Afib   - Rate controlled on Atenolol   -  A/c with Xarelto, currently on hold 2/2 above     Acute HfmrEF, 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 Has not been taking Atentolol outpatient as prescribed, start low dose metoprolol, if tolerates will switch to Toprol XL   - Repeat Lipid panel   - LVEF moderately reduced, no ischemic symptoms, further workup outpatient  - Watchman evaluation outpatient     ADDENDUM 1036: LDL 68, at goal, continue Crestor 10 mg.     Plan of care discussed with patient and RN.     Maribeth Beckham, APRN  8/17/2024  6:57 AM  [phone] ([place])  [phone] ([place])    Seenand examined. MDMper me GI bleed with afib, willneed Watchamn. Resume lasix for severe TR and mild decrease LV fxn. May benefit from tricuspid clilp or Evoque.

## 2024-08-17 NOTE — OPERATIVE REPORT
COLONOSCOPY REPORT    Itzel De Leon     1940 Age 83 year old   PCP Mehrdad Solano DO Endoscopist Kaiser Robles MD       Date of procedure: 24    Procedure: Colonoscopy w/ possible polypectomy    Pre-operative diagnosis: BRBPR    Post-operative diagnosis: LARGE prolapsing internal hemorrhoids with stigmata of recent bleeding    Medications: MAC    Withdrawal time: 14 minutes    Procedure:  Informed consent was obtained from the patient after the risks of the procedure were discussed, including but not limited to bleeding, perforation, aspiration, infection, or possibility of a missed lesion. After discussions of the risks/benefits and alternatives to this procedure, as well as the planned sedation, the patient was placed in the left lateral decubitus position and begun on continuous blood pressure pulse oximetry and EKG monitoring and this was maintained throughout the procedure. Once an adequate level of sedation was obtained a digital rectal exam was completed. Then the lubricated tip of the [id number] diagnostic video colonoscope was inserted and advanced without difficulty to the cecum using water immersion and CO2 insufflation technique. The cecum was identified by localizing the trifold, the appendix and the ileocecal valve. Withdrawal was begun with thorough washing and careful examination of the colonic walls and folds. A routine second examination of the cecum/ascending colon was performed. Photodocumentation was obtained. The bowel prep was good. Views of the colon were good with washing. I then carefully withdrew the instrument from the patient who tolerated the procedure well.     Complications: none.    Findings:   1. Few scattered colon polyps seen (all < 1 cm in size) not removed today given indication for colonoscopy.    2. Diverticulosis: small diverticula noted throughout the colon.    3. Ileocecal valve appeared healthy and normal. Terminal ileum was intubated and was  unremarkable.    4. The colonic mucosa throughout the colon showed normal vascular pattern, without evidence of angioectasias or inflammation.     5. Rectum was meticulously evaluated in antegrade view and was unremarkable except for large internal hemorrhoids.    6. EUGENE: normal rectal tone, no masses palpated. Large prolapsing internal hemorrhoids visualized that were soft and able to be reduced. Hemorrhoids at blood clots on them and had recent stigmata of bleeding.    Impression:   No large polyps visualized.  Normal terminal ileum.  LARGE prolapsed internal hemorrhoids with stigmata of recent bleeding. I suspect that this is the cause of rectal bleeding and drop in hemoglobin especially in setting of anticoagulation.    Recommend:  Surgery consult for more definitive management of hemorrhoids.  Should hemorrhoids be taken care of and hemoglobin still low can consider additional testing in the outpatient setting.  Management of anticoagulation per primary/cardiology.  Transfuse hgb to > 7.  Will see in clinic in 3-4 weeks for follow up.    WILL SIGN OFF.    I discussed the above with the patient's family.    >>>If tissue was obtained and you have not received your pathology results either by phone or letter within 2 weeks, please call our office at [phone].    Specimens: none    Blood loss: <1 ml

## 2024-08-17 NOTE — PROGRESS NOTES
[facility]  part of [facility]    Progress Note    Itzel De Leon Patient Status:  Inpatient    1940 MRN [id number]   Location [facility] 5SW/SE Attending Riccardo Roblero MD   Hosp Day # 2 PCP Mehrdad Solano,        Subjective:   Itzel De Leon is a(n) 83 year old male was seen and examined  Son at bedside, translating  No cp, sob, f,c,v abd pain or HA   No further bleeding     Objective:   Blood pressure 112/62, pulse 63, temperature 98.3 °F (36.8 °C), temperature source Oral, resp. rate 16, height 5' 6\" (1.676 m), weight 215 lb 8 oz (97.8 kg), SpO2 95%.    GENERAL:  Alert and oriented to time, place, and person.  Fatigued.  Does not appear to be in distress.    VITAL SIGNS:  Temperature 98.4, pulse 72, respiratory rate 15, blood pressure 120/69, pulse ox 99% on room air.  HEENT:  Atraumatic.  Oropharynx clear.  Moist mucous membranes.    NECK:  Supple.  No lymphadenopathy.  Jugular venous distention and pulsation noted.  LUNGS:  Diminished breathing sounds, both lung bases.  HEART:  Irregular rhythm.  S1 and S2 auscultated.  Rate controlled on monitor.  ABDOMEN:  Soft, obese.  Positive bowel sounds.  No distention, no tenderness.  EXTREMITIES:  Edema +2 both legs.  No clubbing or cyanosis.    NEUROLOGIC: Motor and sensory intact.     Current Inpatient Medications:     Current Facility-Administered Medications:     metoprolol tartrate (Lopressor) partial tab 12.5 mg, 12.5 mg, Oral, 2x Daily(Beta Blocker)    furosemide (Lasix) tab 20 mg, 20 mg, Oral, QOD    lisinopril (Prinivil; Zestril) tab 5 mg, 5 mg, Oral, Daily    propylthiouracil (PTU) tab 100 mg, 100 mg, Oral, TID    hydrocortisone (Anusol-HC) 2.5 % rectal cream 1 Application, 1 Application, Rectal, Daily PRN    risperiDONE (RisperDAL) tab 0.5 mg, 0.5 mg, Oral, Nightly    rosuvastatin (Crestor) tab 10 mg, 10 mg, Oral, Nightly    pantoprazole (Protonix) 40 mg in sodium chloride 0.9% PF 10 mL IV push, 40 mg, Intravenous,  Daily    acetaminophen (Tylenol Extra Strength) tab 500 mg, 500 mg, Oral, Q4H PRN    ondansetron (Zofran) 4 MG/2ML injection 4 mg, 4 mg, Intravenous, Q6H PRN    temazepam (Restoril) cap 15 mg, 15 mg, Oral, Nightly PRN    Assessment and Plan:   1.       Gastrointestinal bleed.  GI has been consulted   Of note pt with hx of hemorrhoids   These were seen on colonoscopy, found to have large prolapased internal hemorrhoids  Not actively bleeding  Surgery consulted, will undergo hemorrhoidectomy on Monday   Cont to monitor Hgb    2.       Posthemorrhagic microcytic anemia  Cont to transfuse to get Hgb >7  Cont to monitor    3.       Fluid overload status.  Rec'd IV lasix, now on PO  Due to HFrEF, evident on echo  Cardiology following  Cont strict IsOS  Net IO Since Admission: 1,515.17 mL [08/17/24 1621]     4.       Chronic atrial fibrillation.  AC on hold  Rates controlled currently  Cont tele monitoring    5.       Hyperthyroidism  Cont PTU  Will recheck TSH    6.        HTN  Stable for now  Cont to monitor    DVT Ppx: none due to beed  Full code    MDM: High       Results:     Recent Labs   Lab 08/15/24  1428 08/15/24  2111 08/16/24  0524 08/16/24  1258 08/17/24  0511   RBC 3.68*  --  3.52*  --  3.82   HGB 6.3*   < > 6.6* 7.9* 7.5*   HCT 23.5*   < > 22.9* 27.4* 25.8*   MCV 63.9*  --  65.1*  --  67.5*   MCH 17.1*  --  18.8*  --  19.6*   MCHC 26.8*  --  28.8*  --  29.1*   RDW 21.9*  --  24.0*  --  25.8*   NEPRELIM 2.45  --  2.86  --  2.54   WBC 4.5  --  5.3  --  4.9   .0  --  164.0  --  169.0    < > = values in this interval not displayed.         Recent Labs   Lab 08/15/24  1429 08/16/24  0524 08/17/24  0511   * 95 101*   BUN 21 20 20   CREATSERUM 1.03 0.99 0.96   EGFRCR 72 75 78   CA 9.4 9.1 8.8    146* 144   K 3.8 3.2* 3.4*  3.4*    109 107   CO2 27.0 30.0 31.0         Imaging:   No results found.          Riccardo Roblero MD  8/17/2024

## 2024-08-17 NOTE — CONSULTS
[facility]  part of [facility]    Report of Consultation    Itzel De Leon Patient Status:  Inpatient    1940 MRN [id number]   Location [facility] 5SW/SE Attending Riccardo Roblero MD   Hosp Day # 2 PCP Mehrdad Solano,      Date of Admission:  8/15/2024  Date of Consult:  2024    Reason for Consultation:  Hemorrhoidal bleeding    History of Present Illness:  Itzel De Leon is a a(n) 83 year old male. Hx of large hemorrhoids with bleeding   Had colonoscopy yesterday  otherwise neg except polyp  On xarelto  (AF) alzheimers    See below    History:  Past Medical History:    Age-related nuclear cataract of both eyes    Arrhythmia    Asteroid hyalosis of left eye    Atrial fibrillation (HCC)    Disorder of thyroid    Essential hypertension    High blood pressure    Hypertension    Hyperthyroidism    Meibomian gland dysfunction (MGD) of upper and lower lids of both eyes    Neck mass    left-FNA    Unspecified essential hypertension     Past Surgical History:   Procedure Laterality Date    Cataract extraction w/  intraocular lens implant Right 02/15/2016    RJM; significant anxiety in OR, was on Propofol Drip; suggest general anesthesia for OS     Colonoscopy N/A 2024    ;    Electrocardiogram, complete  2012    scanned to media tab, 2012     Family History   Problem Relation Age of Onset    Cancer Father         lung    Diabetes Neg     Glaucoma Neg     Macular degeneration Neg       reports that he has never smoked. He has never been exposed to tobacco smoke. He has never used smokeless tobacco. He reports that he does not drink alcohol and does not use drugs.    Allergies:  No Known Allergies    Medications:    Current Facility-Administered Medications:     metoprolol tartrate (Lopressor) partial tab 12.5 mg, 12.5 mg, Oral, 2x Daily(Beta Blocker)    furosemide (Lasix) tab 20 mg, 20 mg, Oral, QOD    lisinopril (Prinivil; Zestril) tab 5 mg, 5 mg,  Oral, Daily    propylthiouracil (PTU) tab 100 mg, 100 mg, Oral, TID    hydrocortisone (Anusol-HC) 2.5 % rectal cream 1 Application, 1 Application, Rectal, Daily PRN    risperiDONE (RisperDAL) tab 0.5 mg, 0.5 mg, Oral, Nightly    rosuvastatin (Crestor) tab 10 mg, 10 mg, Oral, Nightly    pantoprazole (Protonix) 40 mg in sodium chloride 0.9% PF 10 mL IV push, 40 mg, Intravenous, Daily    acetaminophen (Tylenol Extra Strength) tab 500 mg, 500 mg, Oral, Q4H PRN    ondansetron (Zofran) 4 MG/2ML injection 4 mg, 4 mg, Intravenous, Q6H PRN    temazepam (Restoril) cap 15 mg, 15 mg, Oral, Nightly PRN  Medications Prior to Admission   Medication Sig Dispense Refill Last Dose    propylthiouracil 50 MG Oral Tab Take 2 tablets (100 mg total) by mouth 3 (three) times daily. 540 tablet 3 8/15/2024    rosuvastatin 10 MG Oral Tab rosuvastatin 10 mg tablet, [RxNorm: 761459]   8/15/2024    hydrocortisone (PROCTO-MED HC) 2.5 % External Cream Place 1 Application rectally daily as needed for Hemorrhoids. 45 g 1 8/14/2024    furosemide 20 MG Oral Tab Take 2 tablets (40 mg total) by mouth daily. 180 tablet 3 8/15/2024    enalapril 20 MG Oral Tab Take 1 tablet (20 mg total) by mouth daily. 90 tablet 1 8/15/2024    Rivaroxaban (XARELTO) 20 MG Oral Tab Take 1 tablet (20 mg total) by mouth daily with food. 30 tablet 0 8/14/2024    risperiDONE 0.5 MG Oral Tab Take 1 tablet (0.5 mg total) by mouth nightly.   8/14/2024    atenolol 100 MG Oral Tab Take 1 tablet (100 mg total) by mouth daily. (Patient not taking: Reported on 8/15/2024) 90 tablet 3 Not Taking       Review of Systems:  A ten point review of systems was negative except as noted.    Physical Exam:  Blood pressure 112/62, pulse 63, temperature 98.3 °F (36.8 °C), temperature source Oral, resp. rate 16, height 5' 6\" (1.676 m), weight 215 lb 8 oz (97.8 kg), SpO2 95%.    General: Alert, orientated x3.  Cooperative.  No apparent distress.  Abdomen:  and rectum   - abd   large prolapsed  hemorrhoids  no active bleed now    Laboratory Data:  Lab Results   Component Value Date    WBC 4.9 2024    HGB 7.5 (L) 2024    HCT 25.8 (L) 2024    .0 2024    CREATSERUM 0.96 2024    BUN 20 2024     2024    K 3.4 (L) 2024    K 3.4 (L) 2024     2024    CO2 31.0 2024     (H) 2024    CA 8.8 2024    ALB 3.9 2024    ALKPHO 52 2023    BILT 0.5 2023    TP 7.3 2023    AST 18 2023    ALT 15 (L) 2023    T4F 1.0 2023    TSH 3.684 2023    MG 1.9 2024    PHOS 4.5 2024       Imaging:  CARD ECHO 2D DOPPLER CONTRAST (CPT=93306)    Result Date: 2024  Transthoracic Echocardiogram Name:Itzel De Leon Date: 2024 :  1940 Ht:  (66in)  BP: 113 / 62 MRN:  [id number]    Age:  83years    Wt:  (216lb) HR: Loc:  [facility]       Gndr: M          BSA: 2.06m^2 Sonographer: Phyllis AVILES Ordering:    Aimee Carl Consulting:  Joe Borges ---------------------------------------------------------------------------- History/Indications:   Atrial fibrillation.  Heart failure. GI bleed. ---------------------------------------------------------------------------- Procedure information:  A transthoracic complete 2D study was performed. Additional evaluation included M-mode, complete spectral Doppler, and color Doppler.  Patient status:  Inpatient.  Location:  Bedside.    Comparison was made to the study of 10/20/2016.    This was a routine study. Transthoracic echocardiography for diagnosis and ventricular function evaluation. Image quality was suboptimal. The study was technically limited due to poor acoustic window availability. Intravenous contrast (Definity) was administered to evaluate left ventricular function and opacify the LV. ECG rhythm:   Atrial fibrillation ---------------------------------------------------------------------------- Conclusions: 1. Left  ventricle: The cavity size was normal. Wall thickness was mildly    increased. Systolic function was mildly reduced. The estimated ejection    fraction was 45-50%, by biplane method of disks. No diagnostic evidence    for regional wall motion abnormalities. Unable to assess LV diastolic    function due to heart rhythm. 2. Right ventricle: The cavity size was increased. Systolic function was    normal. 3. Left atrium: The atrium was markedly dilated. 4. Right atrium: The atrium was dilated. 5. Aortic valve: The annulus was calcified. The valve was trileaflet. The    leaflets were mildly thickened. 6. Mitral valve: There was mild regurgitation. 7. Tricuspid valve: There was moderate-severe regurgitation. * ---------------------------------------------------------------------------- * Findings: Left ventricle:  The cavity size was normal. Wall thickness was mildly increased. Systolic function was mildly reduced. The estimated ejection fraction was 45-50%, by biplane method of disks. No diagnostic evidence for diffuse regional wall motion abnormalities. No diagnostic evidence for regional wall motion abnormalities. Unable to assess LV diastolic function due to heart rhythm. Ventricular septum:   Thickness was normal. Left atrium:  The atrium was markedly dilated. Right ventricle:  The cavity size was increased. Systolic function was normal. Right atrium:  The atrium was dilated. Mitral valve:  The valve was structurally normal. The leaflets were normal thickness. Leaflet separation was normal.  Doppler:  Transvalvular velocity was within the normal range. There was no evidence for stenosis. There was mild regurgitation. Aortic valve:  The annulus was calcified. The valve was trileaflet. The leaflets were mildly thickened. Cusp separation was normal.  Doppler: Transvalvular velocity was within the normal range. There was no evidence for stenosis. There was no significant regurgitation. Tricuspid valve:  The valve is  structurally normal. Leaflet separation was normal.  Doppler:  Transvalvular velocity was within the normal range. There was no evidence for stenosis. There was moderate-severe regurgitation. Pulmonic valve:   The valve is structurally normal. Cusp separation was normal.  Doppler:  Transvalvular velocity was within the normal range. There was no evidence for stenosis. There was no significant regurgitation. Pericardium:   There was no pericardial effusion. Aorta: Aortic root: The aortic root was normal-sized. Ascending aorta: The ascending aorta was normal. Pulmonary arteries: The main pulmonary artery was normal-sized.  Systolic pressure could not be accurately estimated. Systemic veins: Inferior vena cava: The IVC was normally collapsible and normal-sized. ---------------------------------------------------------------------------- Measurements  Left ventricle                    Value        Ref  IVS thickness, ED, PLAX           0.9   cm     0.6 - 1.0  LV ID, ED, PLAX                   4.8   cm     4.2 - 5.8  LV ID, ES, PLAX                   3.4   cm     2.5 - 4.0  LV PW thickness, ED, PLAX     (H) 1.1   cm     0.6 - 1.0  IVS/LV PW ratio, ED, PLAX         0.82         ---------  LV PW/LV ID ratio, ED, PLAX       0.23         ---------  LV ejection fraction              56    %      52 - 72  Stroke volume/bsa, 2D             35    ml/m^2 ---------  LV end-diastolic volume, 1-p      80    ml     69 - 185  A4C  LV ejection fraction, 1-p A4C     53    %      46 - 74  Stroke volume, 1-p A4C            43    ml     ---------  LV end-diastolic volume/bsa,      39    ml/m^2 37 - 93  1-p A4C  Stroke volume/bsa, 1-p A4C        21    ml/m^2 ---------  LV end-diastolic volume, 2-p      87    ml     62 - 150  LV end-systolic volume, 2-p       45    ml     21 - 61  LV ejection fraction, 2-p     (L) 48    %      52 - 72  Stroke volume, 2-p                42    ml     ---------  LV end-diastolic volume/bsa,      42    ml/m^2 34  - 74  2-p  LV end-systolic volume/bsa,       22    ml/m^2 11 - 31  2-p  Stroke volume/bsa, 2-p            20.4  ml/m^2 ---------  LV e', lateral                    12.5  cm/sec >=10.0  LV E/e', lateral                  10           <=13  LV e', medial                     9.1   cm/sec >=7.0  LV E/e', medial                   14           ---------  LV e', average                    10.8  cm/sec ---------  LV E/e', average                  12           <=14  LVOT                              Value        Ref  LVOT ID                           2.3   cm     ---------  LVOT peak velocity, S             0.78  m/sec  ---------  LVOT VTI, S                       17.6  cm     ---------  LVOT peak gradient, S             2     mm Hg  ---------  LVOT mean gradient, S             1     mm Hg  ---------  Stroke volume (SV), LVOT DP       73    ml     ---------  Stroke index (SV/bsa), LVOT       35    ml/m^2 ---------  DP  Aortic root                       Value        Ref  Aortic root ID                    3.1   cm     2.7 - 4.2  Aortic root ID, STJ, ED           2.4   cm     2.3 - 3.5  Aortic root ID, ED, MM            3.9   cm     ---------  Ascending aorta                   Value        Ref  Ascending aorta ID                3.2   cm     2.2 - 3.8  Left atrium                       Value        Ref  LA ID, A-P, ES                (H) 4.8   cm     3.0 - 4.0  LA volume, S                  (H) 127   ml     18 - 58  LA volume/bsa, S              (H) 62    ml/m^2 16 - 34  LA volume, ES, 1-p A4C        (H) 102   ml     18 - 58  LA volume, ES, 1-p A2C        (H) 158   ml     18 - 58  LA volume, ES, A/L                137   ml     ---------  LA volume/bsa, ES, A/L        (H) 66    ml/m^2 16 - 34  LA/aortic root ratio              1.55         ---------  Mitral valve                      Value        Ref  Mitral E-wave peak velocity       1.25  m/sec  ---------  Mitral deceleration time          148   ms     ---------  Mitral peak  gradient, D           6     mm Hg  ---------  Mitral regurg peak velocity       363   cm/sec ---------  Mitral peak LV-LA gradient, S     52.71 mm Hg  ---------  Mitral maximal regurg             268   cm/sec ---------  velocity, PISA  Tricuspid valve                   Value        Ref  Tricuspid regurg peak         (H) 2.98  m/sec  <=2.8  velocity  Tricuspid peak RV-RA gradient     36    mm Hg  ---------  Right atrium                      Value        Ref  RA ID, S-I, ES, A4C           (H) 6.8   cm     3.4 - 5.3  RA ID/bsa, S-I, ES, A4C       (H) 3.3   cm/m^2 1.8 - 3.0  RA area, ES, A4C              (H) 28    cm^2   10 - 18  RA volume, ES, 1-p A4C            94    ml     ---------  RA volume/bsa, ES, 1-p A4C    (H) 45    ml/m^2 11 - 39  Inferior vena cava                Value        Ref  ID                                2.0   cm     <=2.1  Right ventricle                   Value        Ref  TAPSE, MM                     (L) 1.68  cm     >=1.70  RV s', lateral                    10.3  cm/sec >=9.5 Legend: (L)  and  (H)  kusum values outside specified reference range. ---------------------------------------------------------------------------- Prepared and electronically signed by Fernando Hodge 08/16/2024 10:49     XR CHEST AP PORTABLE  (CPT=71045)    Result Date: 8/15/2024  PROCEDURE: XR CHEST AP PORTABLE  (CPT=71045) TIME: 1349 hours  COMPARISON: [facility], X CHEST PA LAT ROUTINE, 9/04/2016, 11:44 AM.  INDICATIONS: Shortness of breath today.  TECHNIQUE:   Single view.   FINDINGS:  CARDIAC/VASC: Cardiomegaly.  Prominent central vasculature  MEDIAST/HARDIK:   Atherosclerotic aorta with no visible aneurysm.  LUNGS/PLEURA: No acute pulmonary disease.  Prominent basilar pulmonary markings BONES: Mild scoliosis with mild degenerative disc disease and spondylosis.  OTHER: Negative.          CONCLUSION:  1. Cardiomegaly and prominent central vasculature. 2. Prominent basilar pulmonary markings.  No acute  pulmonary disease.    Dictated by (CST): Rob Isaac MD on 8/15/2024 at 2:13 PM     Finalized by (CST): Rob Isaac MD on 8/15/2024 at 2:14 PM            Impression and Plan:  Patient Active Problem List   Diagnosis    Hyperthyroidism    Atrial fibrillation (HCC)    Age-related nuclear cataract of left eye    Edema    Pure hypercholesterolemia    Alzheimer's disease, unspecified (HCC)    Thrombocytopenia (HCC)    Gastrointestinal hemorrhage, unspecified gastrointestinal hemorrhage type    Severe anemia    GI bleed     Hgb 7.5  (6.6)  For hemorrhoidectomy  RASHIDA RAMIRES MD  8/17/2024  2:45 PM

## 2024-08-17 NOTE — TELEPHONE ENCOUNTER
GI Staff:    Please set up clinic visit with me in 4 weeks.  Okay to use 3pm or noon if nothing available.

## 2024-08-18 LAB
ANION GAP SERPL CALC-SCNC: 5 MMOL/L (ref 0–18)
BASOPHILS # BLD AUTO: 0.03 X10(3) UL (ref 0–0.2)
BASOPHILS NFR BLD AUTO: 0.6 %
BUN BLD-MCNC: 24 MG/DL (ref 9–23)
BUN/CREAT SERPL: 25.3 (ref 10–20)
CALCIUM BLD-MCNC: 9.2 MG/DL (ref 8.7–10.4)
CHLORIDE SERPL-SCNC: 104 MMOL/L (ref 98–112)
CO2 SERPL-SCNC: 30 MMOL/L (ref 21–32)
CREAT BLD-MCNC: 0.95 MG/DL
DEPRECATED RDW RBC AUTO: 62.8 FL (ref 35.1–46.3)
EGFRCR SERPLBLD CKD-EPI 2021: 79 ML/MIN/1.73M2 (ref 60–?)
EOSINOPHIL # BLD AUTO: 0.11 X10(3) UL (ref 0–0.7)
EOSINOPHIL NFR BLD AUTO: 2.1 %
ERYTHROCYTE [DISTWIDTH] IN BLOOD BY AUTOMATED COUNT: 25.9 % (ref 11–15)
GLUCOSE BLD-MCNC: 109 MG/DL (ref 70–99)
HCT VFR BLD AUTO: 25.7 %
HGB BLD-MCNC: 7.6 G/DL
IMM GRANULOCYTES # BLD AUTO: 0.01 X10(3) UL (ref 0–1)
IMM GRANULOCYTES NFR BLD: 0.2 %
LYMPHOCYTES # BLD AUTO: 1.71 X10(3) UL (ref 1–4)
LYMPHOCYTES NFR BLD AUTO: 32 %
MCH RBC QN AUTO: 19.9 PG (ref 26–34)
MCHC RBC AUTO-ENTMCNC: 29.6 G/DL (ref 31–37)
MCV RBC AUTO: 67.5 FL
MONOCYTES # BLD AUTO: 0.64 X10(3) UL (ref 0.1–1)
MONOCYTES NFR BLD AUTO: 12 %
NEUTROPHILS # BLD AUTO: 2.85 X10 (3) UL (ref 1.5–7.7)
NEUTROPHILS # BLD AUTO: 2.85 X10(3) UL (ref 1.5–7.7)
NEUTROPHILS NFR BLD AUTO: 53.1 %
OSMOLALITY SERPL CALC.SUM OF ELEC: 293 MOSM/KG (ref 275–295)
PLATELET # BLD AUTO: 150 10(3)UL (ref 150–450)
PLATELETS.RETICULATED NFR BLD AUTO: 6.2 % (ref 0–7)
POTASSIUM SERPL-SCNC: 3.8 MMOL/L (ref 3.5–5.1)
POTASSIUM SERPL-SCNC: 3.8 MMOL/L (ref 3.5–5.1)
RBC # BLD AUTO: 3.81 X10(6)UL
SODIUM SERPL-SCNC: 139 MMOL/L (ref 136–145)
T4 FREE SERPL-MCNC: 0.9 NG/DL (ref 0.8–1.7)
TSI SER-ACNC: 6.08 MIU/ML (ref 0.55–4.78)
WBC # BLD AUTO: 5.4 X10(3) UL (ref 4–11)

## 2024-08-18 PROCEDURE — 99233 SBSQ HOSP IP/OBS HIGH 50: CPT | Performed by: HOSPITALIST

## 2024-08-18 NOTE — PLAN OF CARE
Problem: Patient Centered Care  Goal: Patient preferences are identified and integrated in the patient's plan of care  Description: Interventions:  - What would you like us to know as we care for you? Home with daughter  - Provide timely, complete, and accurate information to patient/family  - Incorporate patient and family knowledge, values, beliefs, and cultural backgrounds into the planning and delivery of care  - Encourage patient/family to participate in care and decision-making at the level they choose  - Honor patient and family perspectives and choices  Outcome: Progressing     Problem: Patient/Family Goals    Problem: SAFETY ADULT - FALL  Goal: Free from fall injury  Description: INTERVENTIONS:  - Assess pt frequently for physical needs  - Identify cognitive and physical deficits and behaviors that affect risk of falls.  - [place] fall precautions as indicated by assessment.  - Educate pt/family on patient safety including physical limitations  - Instruct pt to call for assistance with activity based on assessment  - Modify environment to reduce risk of injury  - Provide assistive devices as appropriate  - Consider OT/PT consult to assist with strengthening/mobility  - Encourage toileting schedule  Outcome: Progressing     Problem: Altered Communication/Language Barrier  Goal: Patient/Family is able to understand and participate in their care  Description: Interventions:  - Assess communication ability and preferred communication style  - Implement communication aides and strategies  - Use visual cues when possible  - Listen attentively, be patient, do not interrupt  - Minimize distractions  - Allow time for understanding and response  - Establish method for patient to ask for assistance (call light)  - Provide an  as needed  - Communicate barriers and strategies to overcome with those who interact with patient  Outcome: Progressing

## 2024-08-18 NOTE — PROGRESS NOTES
Progress Note  Itzel De Leon Patient Status:  Inpatient    1940 MRN [id number]   Location [facility] 5SW/SE Attending Riccardo Roblero MD   Hosp Day # 3 PCP Mehrdad Solano, DO     Subjective:  Pt denies CP, SOB, orthopnea or further bleeding. Denies lightheadedness or palpitations.     Objective:  /55 (BP Location: Right arm)   Pulse 50   Temp 98.4 °F (36.9 °C) (Oral)   Resp 16   Ht 5' 6\" (1.676 m)   Wt 221 lb (100.2 kg)   SpO2 97%   BMI 35.67 kg/m²     Telemetry: afib 50s-60's, episodes of 30's-40's overnight    Intake/Output:    Intake/Output Summary (Last 24 hours) at 2024 0829  Last data filed at 2024 1631  Gross per 24 hour   Intake 940 ml   Output 1 ml   Net 939 ml       Last 3 Weights   24 0711 221 lb (100.2 kg)   24 0513 215 lb 8 oz (97.8 kg)   08/15/24 1601 219 lb 8 oz (99.6 kg)   08/15/24 1307 220 lb (99.8 kg)   24 1146 221 lb (100.2 kg)   24 1654 222 lb (100.7 kg)       Labs:  Recent Labs   Lab 24  0524 24  0511 24  0654   GLU 95 101* 109*   BUN 20 20 24*   CREATSERUM 0.99 0.96 0.95   EGFRCR 75 78 79   CA 9.1 8.8 9.2   * 144 139   K 3.2* 3.4*  3.4* 3.8  3.8    107 104   CO2 30.0 31.0 30.0     Recent Labs   Lab 24  0524 24  1258 24  0511 24  0653   RBC 3.52*  --  3.82 3.81   HGB 6.6* 7.9* 7.5* 7.6*   HCT 22.9* 27.4* 25.8* 25.7*   MCV 65.1*  --  67.5* 67.5*   MCH 18.8*  --  19.6* 19.9*   MCHC 28.8*  --  29.1* 29.6*   RDW 24.0*  --  25.8* 25.9*   NEPRELIM 2.86  --  2.54 2.85   WBC 5.3  --  4.9 5.4   .0  --  169.0 150.0         Recent Labs   Lab 08/15/24  1429   TROPHS 9       Diagnostics:  No results found.   Review of Systems   Cardiovascular:  Negative for chest pain, dyspnea on exertion, leg swelling, orthopnea, palpitations and paroxysmal nocturnal dyspnea.   Respiratory:  Negative for cough and shortness of breath.    Gastrointestinal:  Negative for abdominal pain.    Neurological:  Negative for dizziness and light-headedness.       Physical Exam:    Gen: alert, oriented x 3, NAD  Heent: pupils equal, reactive. Mucous membranes moist.   Neck: no jvd  Cardiac: irregular rate and rhythm, normal S1,S2, no murmur, clicks, rub or gallop  Lungs: diminished  Abd: soft, distended, +bs  Ext: no edema  Skin: Warm, dry  Neuro: No focal deficits      Medications:     metoprolol tartrate  12.5 mg Oral 2x Daily(Beta Blocker)    furosemide  20 mg Oral QOD    lisinopril  5 mg Oral Daily    propylthiouracil  100 mg Oral TID    risperiDONE  0.5 mg Oral Nightly    rosuvastatin  10 mg Oral Nightly    pantoprazole  40 mg Intravenous Daily       Assessment:  Acute Anemia/GIB  Hgb 6.3 on admit; s/p 3 units PRBC - Hgb 7.6 this am  S/p colonoscopy 8/16 - large internal hemorrhoids  PPI BID  Gen surgery consulted - plan for hemorrhoidectomy  Xarelto on hold  Permanent Afib  On low dose lopressor - held this am d/t episodes of bradycardia 30's-40's  A/C with Xarelto - on hold  Acute HFmrEF, Mod-Sev TR, Mild MR  Echo w/LVEF 45-50%, no RWMA  , 217  S/P IV lasix; I&O not accurate  On ACEI, furosemide 40mg daily at home   Hypertension  BP controlled  On lisinopril   Hyperlipidemia - LDL 68, Crestor 10mg    Plan:  Hold Xarelto - hemorrhoidectomy planned per general surgery. Would benefit from evaluation for Watchman as OP.  Continue lisinopril, lasix  LDL reduced to 68 on Crestor - continue   With mildly reduced EF and severe TR, may benefit from further OP evaluation  Further recs per primary, gen surgery    Plan of care discussed with patient, RN.    Elizabeth John, APRN  8/18/2024  8:29 AM  [phone] [facility]  [phone] [facility]      MDM per me. Patient with GI bleed and large hemorrhoid at mild risk for surgery from CV standpoint with EF 45%, moderate severe TR, HTN, afib. In future consider Watchamn and if HF from TR then Evoque or tricuspid clip.

## 2024-08-18 NOTE — PROGRESS NOTES
[facility]  part of [facility]    Progress Note    Itzel De Leon Patient Status:  Inpatient    1940 MRN [id number]   Location [facility] 5SW/SE Attending Riccardo Roblero MD   Hosp Day # 3 PCP Mehrdad Solano,        Subjective:   Itzel De Leon is a(n) 83 year old male was seen and examined  Daughter at bedside , translating  No cp, sob, f,c,v abd pain or HA   No further bleeding     Objective:   Blood pressure 107/58, pulse 70, temperature 97.8 °F (36.6 °C), temperature source Oral, resp. rate 18, height 5' 6\" (1.676 m), weight 221 lb (100.2 kg), SpO2 96%.    GENERAL:  Alert and oriented to time, place, and person,  Does not appear to be in distress.    HEENT:  Atraumatic.  Oropharynx clear.  Moist mucous membranes.    NECK:  Supple.  No lymphadenopathy.  Jugular venous distention and pulsation noted.  LUNGS:  Diminished breathing sounds, both lung bases.  HEART:  Irregular rhythm.  S1 and S2 auscultated.  Rate controlled on monitor.  ABDOMEN:  Soft, obese.  Positive bowel sounds.  No distention, no tenderness.  EXTREMITIES:  Edema +2 both legs.  No clubbing or cyanosis.    NEUROLOGIC: Motor and sensory intact.     Current Inpatient Medications:     Current Facility-Administered Medications:     [Held by provider] metoprolol tartrate (Lopressor) partial tab 12.5 mg, 12.5 mg, Oral, 2x Daily(Beta Blocker)    furosemide (Lasix) tab 20 mg, 20 mg, Oral, QOD    lisinopril (Prinivil; Zestril) tab 5 mg, 5 mg, Oral, Daily    propylthiouracil (PTU) tab 100 mg, 100 mg, Oral, TID    hydrocortisone (Anusol-HC) 2.5 % rectal cream 1 Application, 1 Application, Rectal, Daily PRN    risperiDONE (RisperDAL) tab 0.5 mg, 0.5 mg, Oral, Nightly    rosuvastatin (Crestor) tab 10 mg, 10 mg, Oral, Nightly    pantoprazole (Protonix) 40 mg in sodium chloride 0.9% PF 10 mL IV push, 40 mg, Intravenous, Daily    acetaminophen (Tylenol Extra Strength) tab 500 mg, 500 mg, Oral, Q4H PRN    ondansetron  (Zofran) 4 MG/2ML injection 4 mg, 4 mg, Intravenous, Q6H PRN    temazepam (Restoril) cap 15 mg, 15 mg, Oral, Nightly PRN    Assessment and Plan:   1.       Gastrointestinal bleed.  GI has been consulted   Of note pt with hx of hemorrhoids   These were seen on colonoscopy, found to have large prolapased internal hemorrhoids  Not actively bleeding  Surgery consulted, will undergo hemorrhoidectomy tomorrow  Cont to monitor Hgb    2.       Posthemorrhagic microcytic anemia  Cont to transfuse to get Hgb >7  Cont to monitor    3.       Fluid overload status.  Rec'd IV lasix, now on PO  Due to HFrEF, evident on echo  Cardiology following  Cont strict IsOs    4.       Chronic atrial fibrillation.  AC on hold  Rates controlled currently  Cont tele monitoring    5.       Hyperthyroidism  Cont PTU  Will recheck TSH    6.        HTN  Stable for now  Cont to monitor    DVT Ppx: none due to beed  Full code    MDM: High       Results:     Recent Labs   Lab 08/16/24  0524 08/16/24  1258 08/17/24  0511 08/18/24  0653   RBC 3.52*  --  3.82 3.81   HGB 6.6* 7.9* 7.5* 7.6*   HCT 22.9* 27.4* 25.8* 25.7*   MCV 65.1*  --  67.5* 67.5*   MCH 18.8*  --  19.6* 19.9*   MCHC 28.8*  --  29.1* 29.6*   RDW 24.0*  --  25.8* 25.9*   NEPRELIM 2.86  --  2.54 2.85   WBC 5.3  --  4.9 5.4   .0  --  169.0 150.0         Recent Labs   Lab 08/16/24  0524 08/17/24  0511 08/18/24  0654   GLU 95 101* 109*   BUN 20 20 24*   CREATSERUM 0.99 0.96 0.95   EGFRCR 75 78 79   CA 9.1 8.8 9.2   * 144 139   K 3.2* 3.4*  3.4* 3.8  3.8    107 104   CO2 30.0 31.0 30.0         Imaging:   No results found.          Riccardo Roblero MD  8/18/2024

## 2024-08-18 NOTE — PROGRESS NOTES
[facility]  part of [facility]    Progress Note    Itzel De Leon Patient Status:  Inpatient    1940 MRN [id number]   Location [facility] 5SW/SE Attending Riccardo Roblero MD   Hosp Day # 3 PCP Mehrdad Solano,      Subjective:  Feels ok    Objective/Physical Exam:  General: Alert, orientated x3.  Cooperative.  No apparent distress.  Vital Signs:  Blood pressure 103/43, pulse 72, temperature 98 °F (36.7 °C), temperature source Oral, resp. rate 16, height 5' 6\" (1.676 m), weight 221 lb (100.2 kg), SpO2 97%.  HEENT: Exam is unremarkable.  Without scleral icterus.  Mucous membranes are moist. Pupils are equal and round, reactive to light and accommodate.  Pupils are approximately 3mm and react to 2mm with reaction to light.  Oropharynx is clear.  Neck: No tenderness to palpitation.  Full range of motion to flexion and extension, lateral rotation and lateral flexion of cervical spine.  No JVD. Supple.   Lungs: Clear to auscultation bilaterally.  Cardiac: Regular rate and rhythm. No murmur.  Abdomen:  Soft, non-distended, non-tender, with no rebound or guarding.  No peritoneal signs. No ascites.  Liver is within normal limits.  Spleen is not palpable.    Extremities:  No lower extremity edema noted.  Without clubbing or cyanosis.    Skin: Normal texture and turgor.  Lymphatic:  No palpable cervical lymphadenopathy.  Neurologic: Cranial nerves are grossly intact.  Motor strength and sensory examination is grossly normal.  No focal neurologic deficit.    Labs:  Lab Results   Component Value Date    WBC 5.4 2024    HGB 7.6 (L) 2024    HCT 25.7 (L) 2024    .0 2024    CREATSERUM 0.95 2024    BUN 24 (H) 2024     2024    K 3.8 2024    K 3.8 2024     2024    CO2 30.0 2024     (H) 2024    CA 9.2 2024    ALB 3.9 2024    ALKPHO 52 2023    BILT 0.5 2023    TP 7.3  2023    AST 18 2023    ALT 15 (L) 2023    T4F 1.0 2023    TSH 3.684 2023    MG 1.9 2024    PHOS 4.5 2024       Imaging:  CARD ECHO 2D DOPPLER CONTRAST (CPT=93306)    Result Date: 2024  Transthoracic Echocardiogram Name:Itzle De Leon Date: 2024 :  1940 Ht:  (66in)  BP: 113 / 62 MRN:  [id number]    Age:  83years    Wt:  (216lb) HR: Loc:  [facility]       Gndr: M          BSA: 2.06m^2 Sonographer: Phyllis AVILES Ordering:    Aimee Carl Consulting:  Joe Borgse ---------------------------------------------------------------------------- History/Indications:   Atrial fibrillation.  Heart failure. GI bleed. ---------------------------------------------------------------------------- Procedure information:  A transthoracic complete 2D study was performed. Additional evaluation included M-mode, complete spectral Doppler, and color Doppler.  Patient status:  Inpatient.  Location:  Bedside.    Comparison was made to the study of 10/20/2016.    This was a routine study. Transthoracic echocardiography for diagnosis and ventricular function evaluation. Image quality was suboptimal. The study was technically limited due to poor acoustic window availability. Intravenous contrast (Definity) was administered to evaluate left ventricular function and opacify the LV. ECG rhythm:   Atrial fibrillation ---------------------------------------------------------------------------- Conclusions: 1. Left ventricle: The cavity size was normal. Wall thickness was mildly    increased. Systolic function was mildly reduced. The estimated ejection    fraction was 45-50%, by biplane method of disks. No diagnostic evidence    for regional wall motion abnormalities. Unable to assess LV diastolic    function due to heart rhythm. 2. Right ventricle: The cavity size was increased. Systolic function was    normal. 3. Left atrium: The atrium was markedly dilated. 4. Right atrium: The atrium was  dilated. 5. Aortic valve: The annulus was calcified. The valve was trileaflet. The    leaflets were mildly thickened. 6. Mitral valve: There was mild regurgitation. 7. Tricuspid valve: There was moderate-severe regurgitation. * ---------------------------------------------------------------------------- * Findings: Left ventricle:  The cavity size was normal. Wall thickness was mildly increased. Systolic function was mildly reduced. The estimated ejection fraction was 45-50%, by biplane method of disks. No diagnostic evidence for diffuse regional wall motion abnormalities. No diagnostic evidence for regional wall motion abnormalities. Unable to assess LV diastolic function due to heart rhythm. Ventricular septum:   Thickness was normal. Left atrium:  The atrium was markedly dilated. Right ventricle:  The cavity size was increased. Systolic function was normal. Right atrium:  The atrium was dilated. Mitral valve:  The valve was structurally normal. The leaflets were normal thickness. Leaflet separation was normal.  Doppler:  Transvalvular velocity was within the normal range. There was no evidence for stenosis. There was mild regurgitation. Aortic valve:  The annulus was calcified. The valve was trileaflet. The leaflets were mildly thickened. Cusp separation was normal.  Doppler: Transvalvular velocity was within the normal range. There was no evidence for stenosis. There was no significant regurgitation. Tricuspid valve:  The valve is structurally normal. Leaflet separation was normal.  Doppler:  Transvalvular velocity was within the normal range. There was no evidence for stenosis. There was moderate-severe regurgitation. Pulmonic valve:   The valve is structurally normal. Cusp separation was normal.  Doppler:  Transvalvular velocity was within the normal range. There was no evidence for stenosis. There was no significant regurgitation. Pericardium:   There was no pericardial effusion. Aorta: Aortic root: The  aortic root was normal-sized. Ascending aorta: The ascending aorta was normal. Pulmonary arteries: The main pulmonary artery was normal-sized.  Systolic pressure could not be accurately estimated. Systemic veins: Inferior vena cava: The IVC was normally collapsible and normal-sized. ---------------------------------------------------------------------------- Measurements  Left ventricle                    Value        Ref  IVS thickness, ED, PLAX           0.9   cm     0.6 - 1.0  LV ID, ED, PLAX                   4.8   cm     4.2 - 5.8  LV ID, ES, PLAX                   3.4   cm     2.5 - 4.0  LV PW thickness, ED, PLAX     (H) 1.1   cm     0.6 - 1.0  IVS/LV PW ratio, ED, PLAX         0.82         ---------  LV PW/LV ID ratio, ED, PLAX       0.23         ---------  LV ejection fraction              56    %      52 - 72  Stroke volume/bsa, 2D             35    ml/m^2 ---------  LV end-diastolic volume, 1-p      80    ml     69 - 185  A4C  LV ejection fraction, 1-p A4C     53    %      46 - 74  Stroke volume, 1-p A4C            43    ml     ---------  LV end-diastolic volume/bsa,      39    ml/m^2 37 - 93  1-p A4C  Stroke volume/bsa, 1-p A4C        21    ml/m^2 ---------  LV end-diastolic volume, 2-p      87    ml     62 - 150  LV end-systolic volume, 2-p       45    ml     21 - 61  LV ejection fraction, 2-p     (L) 48    %      52 - 72  Stroke volume, 2-p                42    ml     ---------  LV end-diastolic volume/bsa,      42    ml/m^2 34 - 74  2-p  LV end-systolic volume/bsa,       22    ml/m^2 11 - 31  2-p  Stroke volume/bsa, 2-p            20.4  ml/m^2 ---------  LV e', lateral                    12.5  cm/sec >=10.0  LV E/e', lateral                  10           <=13  LV e', medial                     9.1   cm/sec >=7.0  LV E/e', medial                   14           ---------  LV e', average                    10.8  cm/sec ---------  LV E/e', average                  12           <=14  LVOT                               Value        Ref  LVOT ID                           2.3   cm     ---------  LVOT peak velocity, S             0.78  m/sec  ---------  LVOT VTI, S                       17.6  cm     ---------  LVOT peak gradient, S             2     mm Hg  ---------  LVOT mean gradient, S             1     mm Hg  ---------  Stroke volume (SV), LVOT DP       73    ml     ---------  Stroke index (SV/bsa), LVOT       35    ml/m^2 ---------  DP  Aortic root                       Value        Ref  Aortic root ID                    3.1   cm     2.7 - 4.2  Aortic root ID, STJ, ED           2.4   cm     2.3 - 3.5  Aortic root ID, ED, MM            3.9   cm     ---------  Ascending aorta                   Value        Ref  Ascending aorta ID                3.2   cm     2.2 - 3.8  Left atrium                       Value        Ref  LA ID, A-P, ES                (H) 4.8   cm     3.0 - 4.0  LA volume, S                  (H) 127   ml     18 - 58  LA volume/bsa, S              (H) 62    ml/m^2 16 - 34  LA volume, ES, 1-p A4C        (H) 102   ml     18 - 58  LA volume, ES, 1-p A2C        (H) 158   ml     18 - 58  LA volume, ES, A/L                137   ml     ---------  LA volume/bsa, ES, A/L        (H) 66    ml/m^2 16 - 34  LA/aortic root ratio              1.55         ---------  Mitral valve                      Value        Ref  Mitral E-wave peak velocity       1.25  m/sec  ---------  Mitral deceleration time          148   ms     ---------  Mitral peak gradient, D           6     mm Hg  ---------  Mitral regurg peak velocity       363   cm/sec ---------  Mitral peak LV-LA gradient, S     52.71 mm Hg  ---------  Mitral maximal regurg             268   cm/sec ---------  velocity, PISA  Tricuspid valve                   Value        Ref  Tricuspid regurg peak         (H) 2.98  m/sec  <=2.8  velocity  Tricuspid peak RV-RA gradient     36    mm Hg  ---------  Right atrium                      Value        Ref  RA ID, S-I, ES, A4C            (H) 6.8   cm     3.4 - 5.3  RA ID/bsa, S-I, ES, A4C       (H) 3.3   cm/m^2 1.8 - 3.0  RA area, ES, A4C              (H) 28    cm^2   10 - 18  RA volume, ES, 1-p A4C            94    ml     ---------  RA volume/bsa, ES, 1-p A4C    (H) 45    ml/m^2 11 - 39  Inferior vena cava                Value        Ref  ID                                2.0   cm     <=2.1  Right ventricle                   Value        Ref  TAPSE, MM                     (L) 1.68  cm     >=1.70  RV s', lateral                    10.3  cm/sec >=9.5 Legend: (L)  and  (H)  kusum values outside specified reference range. ---------------------------------------------------------------------------- Prepared and electronically signed by Fernando Hodge 08/16/2024 10:49     XR CHEST AP PORTABLE  (CPT=71045)    Result Date: 8/15/2024  PROCEDURE: XR CHEST AP PORTABLE  (CPT=71045) TIME: 1349 hours  COMPARISON: [facility], X CHEST PA LAT ROUTINE, 9/04/2016, 11:44 AM.  INDICATIONS: Shortness of breath today.  TECHNIQUE:   Single view.   FINDINGS:  CARDIAC/VASC: Cardiomegaly.  Prominent central vasculature  MEDIAST/HARDIK:   Atherosclerotic aorta with no visible aneurysm.  LUNGS/PLEURA: No acute pulmonary disease.  Prominent basilar pulmonary markings BONES: Mild scoliosis with mild degenerative disc disease and spondylosis.  OTHER: Negative.          CONCLUSION:  1. Cardiomegaly and prominent central vasculature. 2. Prominent basilar pulmonary markings.  No acute pulmonary disease.    Dictated by (CST): Rob Isaac MD on 8/15/2024 at 2:13 PM     Finalized by (CST): Rob Isaac MD on 8/15/2024 at 2:14 PM            Assessment/Plan:  Patient Active Problem List   Diagnosis    Hyperthyroidism    Atrial fibrillation (HCC)    Age-related nuclear cataract of left eye    Edema    Pure hypercholesterolemia    Alzheimer's disease, unspecified (HCC)    Thrombocytopenia (HCC)    Gastrointestinal hemorrhage, unspecified gastrointestinal  hemorrhage type    Severe anemia    GI bleed   For hemorrhoidectomy tomorrow    RASHIDA RAMIRES MD  8/18/2024  11:58 AM

## 2024-08-18 NOTE — PLAN OF CARE
Problem: SAFETY ADULT - FALL  Goal: Free from fall injury  Description: INTERVENTIONS:  - Assess pt frequently for physical needs  - Identify cognitive and physical deficits and behaviors that affect risk of falls.  - [place] fall precautions as indicated by assessment.  - Educate pt/family on patient safety including physical limitations  - Instruct pt to call for assistance with activity based on assessment  - Modify environment to reduce risk of injury  - Provide assistive devices as appropriate  - Consider OT/PT consult to assist with strengthening/mobility  - Encourage toileting schedule  Outcome: Progressing     Problem: Altered Communication/Language Barrier  Goal: Patient/Family is able to understand and participate in their care  Description: Interventions:  - Assess communication ability and preferred communication style  - Implement communication aides and strategies  - Use visual cues when possible  - Listen attentively, be patient, do not interrupt  - Minimize distractions  - Allow time for understanding and response  - Establish method for patient to ask for assistance (call light)  - Provide an  as needed  - Communicate barriers and strategies to overcome with those who interact with patient  Outcome: Progressing     Problem: HEMATOLOGIC - ADULT  Goal: Maintains hematologic stability  Description: INTERVENTIONS  - Assess for signs and symptoms of bleeding or hemorrhage  - Monitor labs and vital signs for trends  - Administer supportive blood products/factors, fluids and medications as ordered and appropriate  - Administer supportive blood products/factors as ordered and appropriate  Outcome: Progressing  Goal: Free from bleeding injury  Description: (Example usage: patient with low platelets)  INTERVENTIONS:  - Avoid intramuscular injections, enemas and rectal medication administration  - Ensure safe mobilization of patient  - Hold pressure on venipuncture sites to achieve adequate  hemostasis  - Assess for signs and symptoms of internal bleeding  - Monitor lab trends  - Patient is to report abnormal signs of bleeding to staff  - Avoid use of toothpicks and dental floss  - Use electric shaver for shaving  - Use soft bristle tooth brush  - Limit straining and forceful nose blowing  Outcome: Progressing

## 2024-08-19 ENCOUNTER — ANESTHESIA (OUTPATIENT)
Dept: SURGERY | Facility: HOSPITAL | Age: 84
End: 2024-08-19
Payer: MEDICARE

## 2024-08-19 ENCOUNTER — ANESTHESIA EVENT (OUTPATIENT)
Dept: SURGERY | Facility: HOSPITAL | Age: 84
End: 2024-08-19
Payer: MEDICARE

## 2024-08-19 LAB
ALBUMIN SERPL-MCNC: 4 G/DL (ref 3.2–4.8)
ANION GAP SERPL CALC-SCNC: 6 MMOL/L (ref 0–18)
ANION GAP SERPL CALC-SCNC: 6 MMOL/L (ref 0–18)
BASOPHILS # BLD AUTO: 0.03 X10(3) UL (ref 0–0.2)
BASOPHILS NFR BLD AUTO: 0.6 %
BUN BLD-MCNC: 20 MG/DL (ref 9–23)
BUN BLD-MCNC: 20 MG/DL (ref 9–23)
BUN/CREAT SERPL: 22.5 (ref 10–20)
BUN/CREAT SERPL: 22.5 (ref 10–20)
CALCIUM BLD-MCNC: 9.1 MG/DL (ref 8.7–10.4)
CALCIUM BLD-MCNC: 9.1 MG/DL (ref 8.7–10.4)
CHLORIDE SERPL-SCNC: 109 MMOL/L (ref 98–112)
CHLORIDE SERPL-SCNC: 109 MMOL/L (ref 98–112)
CO2 SERPL-SCNC: 28 MMOL/L (ref 21–32)
CO2 SERPL-SCNC: 28 MMOL/L (ref 21–32)
CREAT BLD-MCNC: 0.89 MG/DL
CREAT BLD-MCNC: 0.89 MG/DL
DEPRECATED RDW RBC AUTO: 64.6 FL (ref 35.1–46.3)
EGFRCR SERPLBLD CKD-EPI 2021: 85 ML/MIN/1.73M2 (ref 60–?)
EGFRCR SERPLBLD CKD-EPI 2021: 85 ML/MIN/1.73M2 (ref 60–?)
EOSINOPHIL # BLD AUTO: 0.07 X10(3) UL (ref 0–0.7)
EOSINOPHIL NFR BLD AUTO: 1.4 %
ERYTHROCYTE [DISTWIDTH] IN BLOOD BY AUTOMATED COUNT: 26 % (ref 11–15)
GLUCOSE BLD-MCNC: 102 MG/DL (ref 70–99)
GLUCOSE BLD-MCNC: 102 MG/DL (ref 70–99)
HCT VFR BLD AUTO: 27.3 %
HGB BLD-MCNC: 7.7 G/DL
IMM GRANULOCYTES # BLD AUTO: 0.01 X10(3) UL (ref 0–1)
IMM GRANULOCYTES NFR BLD: 0.2 %
LYMPHOCYTES # BLD AUTO: 1.32 X10(3) UL (ref 1–4)
LYMPHOCYTES NFR BLD AUTO: 27.3 %
MCH RBC QN AUTO: 19.6 PG (ref 26–34)
MCHC RBC AUTO-ENTMCNC: 28.2 G/DL (ref 31–37)
MCV RBC AUTO: 69.6 FL
MONOCYTES # BLD AUTO: 0.53 X10(3) UL (ref 0.1–1)
MONOCYTES NFR BLD AUTO: 11 %
NEUTROPHILS # BLD AUTO: 2.88 X10 (3) UL (ref 1.5–7.7)
NEUTROPHILS # BLD AUTO: 2.88 X10(3) UL (ref 1.5–7.7)
NEUTROPHILS NFR BLD AUTO: 59.5 %
OSMOLALITY SERPL CALC.SUM OF ELEC: 299 MOSM/KG (ref 275–295)
OSMOLALITY SERPL CALC.SUM OF ELEC: 299 MOSM/KG (ref 275–295)
PHOSPHATE SERPL-MCNC: 4.1 MG/DL (ref 2.4–5.1)
PLATELET # BLD AUTO: 159 10(3)UL (ref 150–450)
PLATELETS.RETICULATED NFR BLD AUTO: 5.9 % (ref 0–7)
POTASSIUM SERPL-SCNC: 4.1 MMOL/L (ref 3.5–5.1)
POTASSIUM SERPL-SCNC: 4.1 MMOL/L (ref 3.5–5.1)
RBC # BLD AUTO: 3.92 X10(6)UL
SODIUM SERPL-SCNC: 143 MMOL/L (ref 136–145)
SODIUM SERPL-SCNC: 143 MMOL/L (ref 136–145)
WBC # BLD AUTO: 4.8 X10(3) UL (ref 4–11)

## 2024-08-19 PROCEDURE — 06BY0ZC EXCISION OF HEMORRHOIDAL PLEXUS, OPEN APPROACH: ICD-10-PCS | Performed by: SPECIALIST

## 2024-08-19 PROCEDURE — 99233 SBSQ HOSP IP/OBS HIGH 50: CPT | Performed by: HOSPITALIST

## 2024-08-19 RX ORDER — LIDOCAINE HYDROCHLORIDE 10 MG/ML
INJECTION, SOLUTION EPIDURAL; INFILTRATION; INTRACAUDAL; PERINEURAL AS NEEDED
Status: DISCONTINUED | OUTPATIENT
Start: 2024-08-19 | End: 2024-08-19 | Stop reason: SURG

## 2024-08-19 RX ORDER — OXYCODONE HYDROCHLORIDE 5 MG/1
5 TABLET ORAL EVERY 4 HOURS PRN
Status: DISCONTINUED | OUTPATIENT
Start: 2024-08-19 | End: 2024-08-22

## 2024-08-19 RX ORDER — ONDANSETRON 2 MG/ML
4 INJECTION INTRAMUSCULAR; INTRAVENOUS EVERY 6 HOURS PRN
Status: DISCONTINUED | OUTPATIENT
Start: 2024-08-19 | End: 2024-08-19 | Stop reason: HOSPADM

## 2024-08-19 RX ORDER — ONDANSETRON 2 MG/ML
INJECTION INTRAMUSCULAR; INTRAVENOUS AS NEEDED
Status: DISCONTINUED | OUTPATIENT
Start: 2024-08-19 | End: 2024-08-19 | Stop reason: SURG

## 2024-08-19 RX ORDER — BUPIVACAINE HYDROCHLORIDE 5 MG/ML
INJECTION, SOLUTION EPIDURAL; INTRACAUDAL AS NEEDED
Status: DISCONTINUED | OUTPATIENT
Start: 2024-08-19 | End: 2024-08-19 | Stop reason: HOSPADM

## 2024-08-19 RX ORDER — NALOXONE HYDROCHLORIDE 0.4 MG/ML
0.08 INJECTION, SOLUTION INTRAMUSCULAR; INTRAVENOUS; SUBCUTANEOUS AS NEEDED
Status: DISCONTINUED | OUTPATIENT
Start: 2024-08-19 | End: 2024-08-19 | Stop reason: HOSPADM

## 2024-08-19 RX ORDER — HYDROMORPHONE HYDROCHLORIDE 1 MG/ML
0.6 INJECTION, SOLUTION INTRAMUSCULAR; INTRAVENOUS; SUBCUTANEOUS EVERY 5 MIN PRN
Status: DISCONTINUED | OUTPATIENT
Start: 2024-08-19 | End: 2024-08-19 | Stop reason: HOSPADM

## 2024-08-19 RX ORDER — HYDROMORPHONE HYDROCHLORIDE 1 MG/ML
0.4 INJECTION, SOLUTION INTRAMUSCULAR; INTRAVENOUS; SUBCUTANEOUS EVERY 2 HOUR PRN
Status: DISCONTINUED | OUTPATIENT
Start: 2024-08-19 | End: 2024-08-22

## 2024-08-19 RX ORDER — LABETALOL HYDROCHLORIDE 5 MG/ML
5 INJECTION, SOLUTION INTRAVENOUS EVERY 5 MIN PRN
Status: DISCONTINUED | OUTPATIENT
Start: 2024-08-19 | End: 2024-08-19 | Stop reason: HOSPADM

## 2024-08-19 RX ORDER — HYDROMORPHONE HYDROCHLORIDE 1 MG/ML
0.2 INJECTION, SOLUTION INTRAMUSCULAR; INTRAVENOUS; SUBCUTANEOUS EVERY 2 HOUR PRN
Status: DISCONTINUED | OUTPATIENT
Start: 2024-08-19 | End: 2024-08-22

## 2024-08-19 RX ORDER — MORPHINE SULFATE 4 MG/ML
2 INJECTION, SOLUTION INTRAMUSCULAR; INTRAVENOUS EVERY 10 MIN PRN
Status: DISCONTINUED | OUTPATIENT
Start: 2024-08-19 | End: 2024-08-19 | Stop reason: HOSPADM

## 2024-08-19 RX ORDER — SODIUM CHLORIDE, SODIUM LACTATE, POTASSIUM CHLORIDE, CALCIUM CHLORIDE 600; 310; 30; 20 MG/100ML; MG/100ML; MG/100ML; MG/100ML
INJECTION, SOLUTION INTRAVENOUS CONTINUOUS
Status: DISCONTINUED | OUTPATIENT
Start: 2024-08-19 | End: 2024-08-19 | Stop reason: HOSPADM

## 2024-08-19 RX ORDER — DEXAMETHASONE SODIUM PHOSPHATE 4 MG/ML
VIAL (ML) INJECTION AS NEEDED
Status: DISCONTINUED | OUTPATIENT
Start: 2024-08-19 | End: 2024-08-19 | Stop reason: SURG

## 2024-08-19 RX ORDER — HYDROMORPHONE HYDROCHLORIDE 1 MG/ML
0.2 INJECTION, SOLUTION INTRAMUSCULAR; INTRAVENOUS; SUBCUTANEOUS EVERY 5 MIN PRN
Status: DISCONTINUED | OUTPATIENT
Start: 2024-08-19 | End: 2024-08-19 | Stop reason: HOSPADM

## 2024-08-19 RX ORDER — MORPHINE SULFATE 10 MG/ML
6 INJECTION, SOLUTION INTRAMUSCULAR; INTRAVENOUS EVERY 10 MIN PRN
Status: DISCONTINUED | OUTPATIENT
Start: 2024-08-19 | End: 2024-08-19 | Stop reason: HOSPADM

## 2024-08-19 RX ORDER — METOCLOPRAMIDE HYDROCHLORIDE 5 MG/ML
10 INJECTION INTRAMUSCULAR; INTRAVENOUS EVERY 8 HOURS PRN
Status: DISCONTINUED | OUTPATIENT
Start: 2024-08-19 | End: 2024-08-19 | Stop reason: HOSPADM

## 2024-08-19 RX ORDER — HYDROMORPHONE HYDROCHLORIDE 1 MG/ML
0.4 INJECTION, SOLUTION INTRAMUSCULAR; INTRAVENOUS; SUBCUTANEOUS EVERY 5 MIN PRN
Status: DISCONTINUED | OUTPATIENT
Start: 2024-08-19 | End: 2024-08-19 | Stop reason: HOSPADM

## 2024-08-19 RX ORDER — OXYCODONE HYDROCHLORIDE 5 MG/1
2.5 TABLET ORAL EVERY 4 HOURS PRN
Status: DISCONTINUED | OUTPATIENT
Start: 2024-08-19 | End: 2024-08-22

## 2024-08-19 RX ORDER — SODIUM CHLORIDE, SODIUM LACTATE, POTASSIUM CHLORIDE, CALCIUM CHLORIDE 600; 310; 30; 20 MG/100ML; MG/100ML; MG/100ML; MG/100ML
INJECTION, SOLUTION INTRAVENOUS CONTINUOUS PRN
Status: DISCONTINUED | OUTPATIENT
Start: 2024-08-19 | End: 2024-08-19 | Stop reason: SURG

## 2024-08-19 RX ORDER — MORPHINE SULFATE 4 MG/ML
4 INJECTION, SOLUTION INTRAMUSCULAR; INTRAVENOUS EVERY 10 MIN PRN
Status: DISCONTINUED | OUTPATIENT
Start: 2024-08-19 | End: 2024-08-19 | Stop reason: HOSPADM

## 2024-08-19 RX ORDER — DIBUCAINE 0.28 G/28G
OINTMENT TOPICAL AS NEEDED
Status: DISCONTINUED | OUTPATIENT
Start: 2024-08-19 | End: 2024-08-19 | Stop reason: HOSPADM

## 2024-08-19 RX ADMIN — LIDOCAINE HYDROCHLORIDE 50 MG: 10 INJECTION, SOLUTION EPIDURAL; INFILTRATION; INTRACAUDAL; PERINEURAL at 12:19:00

## 2024-08-19 RX ADMIN — SODIUM CHLORIDE, SODIUM LACTATE, POTASSIUM CHLORIDE, CALCIUM CHLORIDE: 600; 310; 30; 20 INJECTION, SOLUTION INTRAVENOUS at 13:31:00

## 2024-08-19 RX ADMIN — SODIUM CHLORIDE, SODIUM LACTATE, POTASSIUM CHLORIDE, CALCIUM CHLORIDE: 600; 310; 30; 20 INJECTION, SOLUTION INTRAVENOUS at 12:13:00

## 2024-08-19 RX ADMIN — ONDANSETRON 4 MG: 2 INJECTION INTRAMUSCULAR; INTRAVENOUS at 12:19:00

## 2024-08-19 RX ADMIN — DEXAMETHASONE SODIUM PHOSPHATE 4 MG: 4 MG/ML VIAL (ML) INJECTION at 12:19:00

## 2024-08-19 NOTE — PLAN OF CARE
Problem: Patient Centered Care  Goal: Patient preferences are identified and integrated in the patient's plan of care  Description: Interventions:  - What would you like us to know as we care for you? From home with daughter  - Provide timely, complete, and accurate information to patient/family  - Incorporate patient and family knowledge, values, beliefs, and cultural backgrounds into the planning and delivery of care  - Encourage patient/family to participate in care and decision-making at the level they choose  - Honor patient and family perspectives and choices  Outcome: Progressing     Problem: Patient/Family Goals  Goal: Patient/Family Long Term Goal  Description: Patient's Long Term Goal: To go home    Interventions:  - Monitor vital signs and labs  - Pain management as needed  - Diagnostics per order  - Follow MD orders  - Administer medications as ordered  - Assists with activities of daily living   - Update patient and family on plan of care  - Discharge planning  - See additional Care Plan goals for specific interventions  Outcome: Progressing  Goal: Patient/Family Short Term Goal  Description: Patient's Short Term Goal: To get better    Interventions:   - Monitor vital signs and labs  - Pain management as needed  - Diagnostics per order  - Follow MD orders  - Administer medications as ordered  - Assists with activities of daily living   - Update patient and family on plan of care  - Discharge planning  - See additional Care Plan goals for specific interventions  Outcome: Progressing     Problem: SAFETY ADULT - FALL  Goal: Free from fall injury  Description: INTERVENTIONS:  - Assess pt frequently for physical needs  - Identify cognitive and physical deficits and behaviors that affect risk of falls.  - [place] fall precautions as indicated by assessment.  - Educate pt/family on patient safety including physical limitations  - Instruct pt to call for assistance with activity based on assessment  -  Modify environment to reduce risk of injury  - Provide assistive devices as appropriate  - Consider OT/PT consult to assist with strengthening/mobility  - Encourage toileting schedule  Outcome: Progressing     Problem: Altered Communication/Language Barrier  Goal: Patient/Family is able to understand and participate in their care  Description: Interventions:  - Assess communication ability and preferred communication style  - Implement communication aides and strategies  - Use visual cues when possible  - Listen attentively, be patient, do not interrupt  - Minimize distractions  - Allow time for understanding and response  - Establish method for patient to ask for assistance (call light)  - Provide an  as needed  - Communicate barriers and strategies to overcome with those who interact with patient  Outcome: Progressing     Problem: GASTROINTESTINAL - ADULT  Goal: Maintains or returns to baseline bowel function  Description: INTERVENTIONS:  - Assess bowel function  - Maintain adequate hydration with IV or PO as ordered and tolerated  - Evaluate effectiveness of GI medications  - Encourage mobilization and activity  - Obtain nutritional consult as needed  - Establish a toileting routine/schedule  - Consider collaborating with pharmacy to review patient's medication profile  Outcome: Progressing     Problem: HEMATOLOGIC - ADULT  Goal: Maintains hematologic stability  Description: INTERVENTIONS  - Assess for signs and symptoms of bleeding or hemorrhage  - Monitor labs and vital signs for trends  - Administer supportive blood products/factors, fluids and medications as ordered and appropriate  - Administer supportive blood products/factors as ordered and appropriate  Outcome: Progressing  Goal: Free from bleeding injury  Description: (Example usage: patient with low platelets)  INTERVENTIONS:  - Avoid intramuscular injections, enemas and rectal medication administration  - Ensure safe mobilization of  patient  - Hold pressure on venipuncture sites to achieve adequate hemostasis  - Assess for signs and symptoms of internal bleeding  - Monitor lab trends  - Patient is to report abnormal signs of bleeding to staff  - Avoid use of toothpicks and dental floss  - Use electric shaver for shaving  - Use soft bristle tooth brush  - Limit straining and forceful nose blowing  Outcome: Progressing     Vital signs stable at this time. No acute changes noted at this moment. Fall precautions in place- bed alarm on and locked in lowest position. Patient's family member remained at bedside overnight. Call light within reach. Frequent rounding by nursing staff.

## 2024-08-19 NOTE — PLAN OF CARE
Problem: SAFETY ADULT - FALL  Goal: Free from fall injury  Description: INTERVENTIONS:  - Assess pt frequently for physical needs  - Identify cognitive and physical deficits and behaviors that affect risk of falls.  - [place] fall precautions as indicated by assessment.  - Educate pt/family on patient safety including physical limitations  - Instruct pt to call for assistance with activity based on assessment  - Modify environment to reduce risk of injury  - Provide assistive devices as appropriate  - Consider OT/PT consult to assist with strengthening/mobility  - Encourage toileting schedule  Outcome: Progressing     Problem: HEMATOLOGIC - ADULT  Goal: Maintains hematologic stability  Description: INTERVENTIONS  - Assess for signs and symptoms of bleeding or hemorrhage  - Monitor labs and vital signs for trends  - Administer supportive blood products/factors, fluids and medications as ordered and appropriate  - Administer supportive blood products/factors as ordered and appropriate  Outcome: Progressing  Goal: Free from bleeding injury  Description: (Example usage: patient with low platelets)  INTERVENTIONS:  - Avoid intramuscular injections, enemas and rectal medication administration  - Ensure safe mobilization of patient  - Hold pressure on venipuncture sites to achieve adequate hemostasis  - Assess for signs and symptoms of internal bleeding  - Monitor lab trends  - Patient is to report abnormal signs of bleeding to staff  - Avoid use of toothpicks and dental floss  - Use electric shaver for shaving  - Use soft bristle tooth brush  - Limit straining and forceful nose blowing  Outcome: Progressing     Patient went to OR today, vital signs stable on return to unit. Weaned to room air. On remote telemetry monitoring. Tolerating regular diet. Ambulating with stand by assist.   Safety precautions in place, frequent nursing rounding completed, call light within reach. All questions answered and needs met.

## 2024-08-19 NOTE — PLAN OF CARE
Attempted to see patient, however still in surgery, will see tomorrow morning.    Continue holding anticoagulation post op.  Continue current cardiac meds if able to take oral intake.      CORBIN Hanks  8/19/2024  16:28

## 2024-08-19 NOTE — BRIEF OP NOTE
Patients Name: Itzel De Leon  Attending Physician: Riccardo Roblero MD  Operating Physician: RASHIDA RAMIRES MD  CSN: [id number]     Location:  OR  MRN: [id number]    YOB: 1940  Admission Date: 8/15/2024  Operation Date: 8/19/2024    Brief Operative Report    Pre-Operative Diagnosis: Hemorrhoids [K64.9]bleeding  and prolapsed    Post-Operative Diagnosis: Same as above.    Procedure Performed: PROCTOSCOPY AND HEMORRHOIDECTOMY     Surgeon: RASHIDA RAMIRES MD    Assistants: Leona    Anesthesia: General    Attending:  Osbaldo    EBL: 5cc    Findings: prolapsed bleeding hemorrhoids    Specimens:    ID Type Source Tests Collected by Time Destination   1 : 1. Hemorrhoid at 10 [employer] Hemorrhoids SURGICAL PATHOLOGY Rashida Ray MD 8/19/2024 1244    2 : 2. Hemorrhoid at  1 [employer] Hemorrhoids SURGICAL PATHOLOGY Rashida Ray MD 8/19/2024 1251    3 : 3. Hemorrhoid at  6 [employer] Hemorrhoids SURGICAL PATHOLOGY Rashida Ray MD 8/19/2024 1257    4 : 4. Hemorrhoid at  4 [employer] Hemorrhoids SURGICAL PATHOLOGY Rashida Ray MD 8/19/2024 1305    5 : 8. Hemorrhoid at  8 [employer] Hemorrhoids SURGICAL PATHOLOGY Rashida Ray MD 8/19/2024 1305        Drains: none    Complications: None    Disposition: stable     RASHIDA RAMIRES MD  8/19/2024  1:29 PM

## 2024-08-19 NOTE — ANESTHESIA POSTPROCEDURE EVALUATION
Patient: Itzel Rosalesari    Procedure Summary       Date: 08/19/24 Room / Location: [facility] MAIN OR  / [facility] MAIN OR    Anesthesia Start: 1213 Anesthesia Stop: 1338    Procedure: PROCTOSCOPY AND HEMORRHOIDECTOMY (Anus) Diagnosis:       Hemorrhoids      (Hemorrhoids [K64.9])    Surgeons: Kalyan Sosa MD Anesthesiologist: Kobi Sprague MD    Anesthesia Type: general ASA Status: 3            Anesthesia Type: general    Vitals Value Taken Time   /74 08/19/24 1337   Temp 97.7 °F (36.5 °C) 08/19/24 1336   Pulse 74 08/19/24 1337   Resp 10 08/19/24 1337   SpO2 97 % 08/19/24 1337   Vitals shown include unfiled device data.    [facility] AN Post Evaluation:   Patient Evaluated in PACU  Patient Participation: complete - patient participated  Level of Consciousness: awake and alert  Pain Score: 0  Pain Management: adequate  Airway Patency:patent  Yes    Nausea/Vomiting: none  Cardiovascular Status: acceptable  Respiratory Status: acceptable  Postoperative Hydration acceptable      KOBI SPRAGUE MD  8/19/2024 1:38 PM

## 2024-08-19 NOTE — OPERATIVE REPORT
[facility]    PATIENT'S NAME: BELEM TAYLOR   ATTENDING PHYSICIAN: Riccardo Roblero MD   OPERATING PHYSICIAN: Kalyan Sosa MD   PATIENT ACCOUNT#:   [id number]    LOCATION:  [address] [place] [address]  MEDICAL RECORD #:   [id number]       YOB: 1940  ADMISSION DATE:       08/15/2024      OPERATION DATE:  08/19/2024    OPERATIVE REPORT    PREOPERATIVE DIAGNOSIS:  Bleeding, prolapsed hemorrhoids.   POSTOPERATIVE DIAGNOSIS:  Bleeding, prolapsed hemorrhoids.   PROCEDURE:  Hemorrhoidectomy and proctoscopy.    ASSISTANT:  Fay Brooke MD    BLOOD LOSS:  5 mL.     SPECIMENS:  Hemorrhoidal tissue.      DRAINS:  None.    COMPLICATIONS:  None.    DISPOSITION:  Stable.     INDICATIONS:  The patient presented with bleeding, prolapsed grade 4 hemorrhoids.      FINDINGS:  Prolapse with bleeding hemorrhoids    OPERATIVE TECHNIQUE:  He came into the operating room, underwent general endotracheal anesthesia.  Compression boots were placed.  The patient was placed in the prone position.  We prepped and draped the area of rectum and buttocks.  These are very large prolapsed bleeding hemorrhoids.  The main hemorrhoids were at 10 o'clock, 1 o'clock, and 6 o'clock.  There were 2 smaller hemorrhoids at 4 o'clock and 8 o'clock.  We started with the larger hemorrhoids.  Opened the anoderm with a scalpel, grasped the complete hemorrhoid starting at the one at 10 o'clock using a Xochitl clamp, then amputated the hemorrhoid, sewed with a running 0 Vicryl suture, leaving the external portion open.  This exact same excision was repeated at 1 o'clock and at 6 o'clock, removing the large bleeding hemorrhoid bundle.  The 2 smaller hemorrhoids were excised after placing a hemostat over them, removing them with scalpel, and then oversewing with the Vicryl.  All bleeding was controlled.  Afterwards, seen to have sphincter control.  We did infiltrate circumferentially with 0.5% Marcaine without.  Gelfoam with Nupercainal  ointment was placed in the rectum and a pressure dressing.  No complications.  Patient tolerated the procedure well.     Dictated By Kalyan Sosa MD  d: 08/19/2024 13:33:13  t: 08/19/2024 14:10:52  [facility] [id number]  RHG/    cc: Riccardo Roblero MD

## 2024-08-19 NOTE — PROGRESS NOTES
Progress Note  Itzel De Leon Patient Status:  Inpatient    1940 MRN [id number]   Location [facility] PRE OP RECOVERY Attending Riccardo Roblero MD   Hosp Day # 4 PCP Mehrdad Solano,      Subjective:  ***    Objective:  /56 (BP Location: Left arm)   Pulse 68   Temp 97.7 °F (36.5 °C) (Oral)   Resp 16   Ht 5' 6\" (1.676 m)   Wt 213 lb 3.2 oz (96.7 kg)   SpO2 100%   BMI 34.41 kg/m²     Telemetry: ***      Intake/Output:    Intake/Output Summary (Last 24 hours) at 2024 1208  Last data filed at 2024 0824  Gross per 24 hour   Intake 590 ml   Output --   Net 590 ml       Last 3 Weights   24 1126 213 lb 3.2 oz (96.7 kg)   24 0609 213 lb 3.2 oz (96.7 kg)   24 0711 221 lb (100.2 kg)   24 0513 215 lb 8 oz (97.8 kg)   08/15/24 1601 219 lb 8 oz (99.6 kg)   08/15/24 1307 220 lb (99.8 kg)   24 1146 221 lb (100.2 kg)   24 1654 222 lb (100.7 kg)       Labs:  Recent Labs   Lab 24  0511 24  0654 24  0823   * 109* 102*  102*   BUN 20 24* 20  20   CREATSERUM 0.96 0.95 0.89  0.89   EGFRCR 78 79 85  85   CA 8.8 9.2 9.1  9.1    139 143  143   K 3.4*  3.4* 3.8  3.8 4.1  4.1    104 109  109   CO2 31.0 30.0 28.0  28.0     Recent Labs   Lab 24  0511 24  0653 24  0823   RBC 3.82 3.81 3.92   HGB 7.5* 7.6* 7.7*   HCT 25.8* 25.7* 27.3*   MCV 67.5* 67.5* 69.6*   MCH 19.6* 19.9* 19.6*   MCHC 29.1* 29.6* 28.2*   RDW 25.8* 25.9* 26.0*   NEPRELIM 2.54 2.85 2.88   WBC 4.9 5.4 4.8   .0 150.0 159.0         Recent Labs   Lab 08/15/24  1429   TROPHS 9       ROS    Physical Exam:    Gen: alert, oriented x 3, NAD  Heent: pupils equal, reactive. Mucous membranes moist.   Neck: no jvd  Cardiac: regular rate and rhythm, normal S1,S2  Lungs: CTA  Abd: soft, NT/ND +bs  Ext: no edema  Skin: Warm, dry  Neuro: No focal deficits        Medications:     [Held by provider] metoprolol tartrate  12.5 mg Oral 2x Daily(Beta  Blocker)    [Transfer Hold] furosemide  20 mg Oral QOD    [Transfer Hold] lisinopril  5 mg Oral Daily    [Transfer Hold] propylthiouracil  100 mg Oral TID    [Transfer Hold] risperiDONE  0.5 mg Oral Nightly    [Transfer Hold] rosuvastatin  10 mg Oral Nightly    [Transfer Hold] pantoprazole  40 mg Intravenous Daily       Assessment:  Acute anemia/GIB  Hgb 6.3 on admit; s/p 3 units PRBC - Hgb 7.7 this am  S/p colonoscopy 8/16 - large internal hemorrhoids  PPI BID  Plan for hemorrhoidectomy today with general surgery   Xarelto on hold  Permanent Afib  On low dose lopressor   A/C with Xarelto - on hold  Acute HFmrEF, Mod-Sev TR, Mild MR  Echo w/LVEF 45-50%, no RWMA  , 217  S/P IV lasix; I&O not accurate  On ACEI, furosemide 40mg daily at home   Hypertension  BP controlled  On lisinopril   Hyperlipidemia - LDL 68, Crestor 10mg    Plan:      Plan of care discussed with patient, RN.    Fatuma Durant, APRN  8/19/2024  12:08 PM  [phone]

## 2024-08-19 NOTE — PROGRESS NOTES
[facility]  part of [facility]    Progress Note    Itzel De Leon Patient Status:  Inpatient    1940 MRN [id number]   Location [facility] 5SW/SE Attending Riccardo Roblero MD   Hosp Day # 4 PCP Mehrdad Solano, DO       Subjective:   Itzel De Leon is a(n) 83 year old male was seen and examined  Family at bedside, translating  No cp, sob, f,c,v abd pain or HA   No further bleeding   To go for hemorrhoidectomy today    Objective:   Blood pressure 143/76, pulse 70, temperature 97.7 °F (36.5 °C), temperature source Oral, resp. rate 12, height 5' 6\" (1.676 m), weight 213 lb 3.2 oz (96.7 kg), SpO2 99%.    GENERAL:  Alert and oriented to time, place, and person,  Does not appear to be in distress.    HEENT:  Atraumatic.  Oropharynx clear.  Moist mucous membranes.    NECK:  Supple.  No lymphadenopathy.  Jugular venous distention and pulsation noted.  LUNGS:  Diminished breathing sounds, both lung bases.  HEART:  Irregular rhythm.  S1 and S2 auscultated.  Rate controlled on monitor.  ABDOMEN:  Soft, obese.  Positive bowel sounds.  No distention, no tenderness.  EXTREMITIES:  Edema +1-2 both legs.  No clubbing or cyanosis.    NEUROLOGIC: Motor and sensory intact.     Current Inpatient Medications:     Current Facility-Administered Medications:     oxyCODONE immediate release tab 2.5 mg, 2.5 mg, Oral, Q4H PRN **OR** oxyCODONE immediate release tab 5 mg, 5 mg, Oral, Q4H PRN    HYDROmorphone (Dilaudid) 1 MG/ML injection 0.2 mg, 0.2 mg, Intravenous, Q2H PRN **OR** HYDROmorphone (Dilaudid) 1 MG/ML injection 0.4 mg, 0.4 mg, Intravenous, Q2H PRN    ceFAZolin (Ancef) 2g in 10mL IV syringe premix, 2 g, Intravenous, Q8H    [Held by provider] metoprolol tartrate (Lopressor) partial tab 12.5 mg, 12.5 mg, Oral, 2x Daily(Beta Blocker)    [Transfer Hold] furosemide (Lasix) tab 20 mg, 20 mg, Oral, QOD    [Transfer Hold] lisinopril (Prinivil; Zestril) tab 5 mg, 5 mg, Oral, Daily    [Transfer Hold]  propylthiouracil (PTU) tab 100 mg, 100 mg, Oral, TID    [Transfer Hold] risperiDONE (RisperDAL) tab 0.5 mg, 0.5 mg, Oral, Nightly    [Transfer Hold] rosuvastatin (Crestor) tab 10 mg, 10 mg, Oral, Nightly    [Transfer Hold] pantoprazole (Protonix) 40 mg in sodium chloride 0.9% PF 10 mL IV push, 40 mg, Intravenous, Daily    [Transfer Hold] ondansetron (Zofran) 4 MG/2ML injection 4 mg, 4 mg, Intravenous, Q6H PRN    [Transfer Hold] temazepam (Restoril) cap 15 mg, 15 mg, Oral, Nightly PRN    Assessment and Plan:   1.       Gastrointestinal bleed.  GI has been consulted   Of note pt with hx of hemorrhoids   These were seen on colonoscopy, found to have large prolapased internal hemorrhoids  Not actively bleeding  Surgery consulted, will undergo hemorrhoidectomy today  Cont to monitor Hgb    2.       Posthemorrhagic microcytic anemia  Cont to transfuse to get Hgb >7  Cont to monitor    3.       Fluid overload status.  Rec'd IV lasix, now on PO  Due to HFrEF, evident on echo  Cardiology following  Cont strict IsOs    4.       Chronic atrial fibrillation.  AC on hold  Rates controlled currently  Cont tele monitoring    5.       Hyperthyroidism  Cont PTU  Will recheck TSH    6.        HTN  Stable for now  Cont to monitor    DVT Ppx: none due to beed  Full code    MDM: High       Results:     Recent Labs   Lab 08/17/24  0511 08/18/24  0653 08/19/24  0823   RBC 3.82 3.81 3.92   HGB 7.5* 7.6* 7.7*   HCT 25.8* 25.7* 27.3*   MCV 67.5* 67.5* 69.6*   MCH 19.6* 19.9* 19.6*   MCHC 29.1* 29.6* 28.2*   RDW 25.8* 25.9* 26.0*   NEPRELIM 2.54 2.85 2.88   WBC 4.9 5.4 4.8   .0 150.0 159.0         Recent Labs   Lab 08/17/24  0511 08/18/24  0654 08/19/24  0823   * 109* 102*  102*   BUN 20 24* 20  20   CREATSERUM 0.96 0.95 0.89  0.89   EGFRCR 78 79 85  85   CA 8.8 9.2 9.1  9.1    139 143  143   K 3.4*  3.4* 3.8  3.8 4.1  4.1    104 109  109   CO2 31.0 30.0 28.0  28.0         Imaging:   No results found.           Riccardo Roblero MD  8/19/2024

## 2024-08-19 NOTE — ANESTHESIA PROCEDURE NOTES
Airway  Date/Time: 8/19/2024 12:22 PM  Urgency: Elective    Airway not difficult    General Information and Staff    Patient location during procedure: OR  Anesthesiologist: Tru Sprague MD  Performed: anesthesiologist   Performed by: Tru Sprague MD  Authorized by: Tru Sprague MD      Indications and Patient Condition  Indications for airway management: anesthesia  Spontaneous ventilation: present  Sedation level: deep  Preoxygenated: yes  Patient position: sniffing  MILS maintained throughout  Mask difficulty assessment: 1 - vent by mask    Final Airway Details  Final airway type: endotracheal airway      Successful airway: ETT  Cuffed: yes   Successful intubation technique: direct laryngoscopy  Endotracheal tube insertion site: oral  Blade: Nitin  Blade size: #3  ETT size (mm): 8.0    Cormack-Lehane Classification: grade I - full view of glottis  Placement verified by: capnometry   Cuff volume (mL): 8  Measured from: teeth  ETT to teeth (cm): 24  Number of attempts at approach: 1  Number of other approaches attempted: 0

## 2024-08-19 NOTE — ANESTHESIA PREPROCEDURE EVALUATION
Anesthesia PreOp Note    HPI:     Itzel De Leon is a 83 year old male who presents for preoperative consultation requested by: Kalyan Sosa MD    Date of Surgery: 8/15/2024 - 8/19/2024    Procedure(s):  PROCTOSCOPY AND HEMORRHOIDECTOMY  Indication: Hemorrhoids [K64.9]    Relevant Problems   No relevant active problems       NPO:  Last Liquid Consumption Date: 08/18/24  Last Liquid Consumption Time: 2359  Last Solid Consumption Date: 08/18/24  Last Solid Consumption Time: 2359  Last Liquid Consumption Date: 08/18/24          History Review:  Patient Active Problem List    Diagnosis Date Noted    Gastrointestinal hemorrhage, unspecified gastrointestinal hemorrhage type 08/15/2024    Severe anemia 08/15/2024    GI bleed 08/15/2024    Alzheimer's disease, unspecified (HCC) 04/28/2022    Thrombocytopenia (HCC) 04/28/2022    Pure hypercholesterolemia 12/17/2019    Edema 12/04/2017    Age-related nuclear cataract of left eye 11/29/2016    Atrial fibrillation (HCC) 09/26/2016    Hyperthyroidism 12/14/2015       Past Medical History:    Age-related nuclear cataract of both eyes    Arrhythmia    Asteroid hyalosis of left eye    Atrial fibrillation (HCC)    Disorder of thyroid    Essential hypertension    High blood pressure    Hypertension    Hyperthyroidism    Meibomian gland dysfunction (MGD) of upper and lower lids of both eyes    Neck mass    left-FNA    Unspecified essential hypertension       Past Surgical History:   Procedure Laterality Date    Cataract extraction w/  intraocular lens implant Right 02/15/2016    RJM; significant anxiety in OR, was on Propofol Drip; suggest general anesthesia for OS     Colonoscopy N/A 08/16/2024    ;    Colonoscopy N/A 8/16/2024    Procedure: COLONOSCOPY;  Surgeon: Kaiser Robles MD;  Location: [facility] ENDOSCOPY    Electrocardiogram, complete  07/17/2012    scanned to media tab, 07-       Medications Prior to Admission   Medication Sig Dispense Refill Last  Dose    propylthiouracil 50 MG Oral Tab Take 2 tablets (100 mg total) by mouth 3 (three) times daily. 540 tablet 3 8/15/2024    rosuvastatin 10 MG Oral Tab rosuvastatin 10 mg tablet, [RxNorm: 580005]   8/15/2024    hydrocortisone (PROCTO-MED HC) 2.5 % External Cream Place 1 Application rectally daily as needed for Hemorrhoids. 45 g 1 8/14/2024    furosemide 20 MG Oral Tab Take 2 tablets (40 mg total) by mouth daily. 180 tablet 3 8/15/2024    enalapril 20 MG Oral Tab Take 1 tablet (20 mg total) by mouth daily. 90 tablet 1 8/15/2024    Rivaroxaban (XARELTO) 20 MG Oral Tab Take 1 tablet (20 mg total) by mouth daily with food. 30 tablet 0 8/14/2024    risperiDONE 0.5 MG Oral Tab Take 1 tablet (0.5 mg total) by mouth nightly.   8/14/2024    atenolol 100 MG Oral Tab Take 1 tablet (100 mg total) by mouth daily. (Patient not taking: Reported on 8/15/2024) 90 tablet 3 Not Taking     Current Facility-Administered Medications Ordered in Epic   Medication Dose Route Frequency Provider Last Rate Last Admin    [Held by provider] metoprolol tartrate (Lopressor) partial tab 12.5 mg  12.5 mg Oral 2x Daily(Beta Blocker) Maribeth Beckham APRN   12.5 mg at 08/17/24 1823    furosemide (Lasix) tab 20 mg  20 mg Oral QOD Maribeth Beckham APRN   20 mg at 08/17/24 0827    lisinopril (Prinivil; Zestril) tab 5 mg  5 mg Oral Daily Maribeth Beckham APRN   5 mg at 08/17/24 0827    propylthiouracil (PTU) tab 100 mg  100 mg Oral TID Riccardo Roblero MD   100 mg at 08/18/24 2049    hydrocortisone (Anusol-HC) 2.5 % rectal cream 1 Application  1 Application Rectal Daily PRN Riccardo Roblero MD        risperiDONE (RisperDAL) tab 0.5 mg  0.5 mg Oral Nightly Riccardo Roblero MD   0.5 mg at 08/18/24 2049    rosuvastatin (Crestor) tab 10 mg  10 mg Oral Nightly Riccardo Roblero MD   10 mg at 08/18/24 2050    pantoprazole (Protonix) 40 mg in sodium chloride 0.9% PF 10 mL IV push  40 mg Intravenous Daily Aimee Carl MD    40 mg at 08/19/24 0827    acetaminophen (Tylenol Extra Strength) tab 500 mg  500 mg Oral Q4H PRN Aimee Carl MD        ondansetron (Zofran) 4 MG/2ML injection 4 mg  4 mg Intravenous Q6H PRN Aimee Carl MD        temazepam (Restoril) cap 15 mg  15 mg Oral Nightly PRN Aimee Carl MD         No current Epic-ordered outpatient medications on file.       No Known Allergies    Family History   Problem Relation Age of Onset    Cancer Father         lung    Diabetes Neg     Glaucoma Neg     Macular degeneration Neg      Social History     Socioeconomic History    Marital status:    Tobacco Use    Smoking status: Never     Passive exposure: Never    Smokeless tobacco: Never   Vaping Use    Vaping status: Never Used   Substance and Sexual Activity    Alcohol use: No    Drug use: No   Other Topics Concern    Caffeine Concern Yes     Comment: coffee, 2 cups daily       Available pre-op labs reviewed.  Lab Results   Component Value Date    WBC 4.8 08/19/2024    RBC 3.92 08/19/2024    HGB 7.7 (L) 08/19/2024    HCT 27.3 (L) 08/19/2024    MCV 69.6 (L) 08/19/2024    MCH 19.6 (L) 08/19/2024    MCHC 28.2 (L) 08/19/2024    RDW 26.0 (H) 08/19/2024    .0 08/19/2024     Lab Results   Component Value Date     08/19/2024     08/19/2024    K 4.1 08/19/2024    K 4.1 08/19/2024     08/19/2024     08/19/2024    CO2 28.0 08/19/2024    CO2 28.0 08/19/2024    BUN 20 08/19/2024    BUN 20 08/19/2024    CREATSERUM 0.89 08/19/2024    CREATSERUM 0.89 08/19/2024     (H) 08/19/2024     (H) 08/19/2024    CA 9.1 08/19/2024    CA 9.1 08/19/2024          Vital Signs:  Body mass index is 34.41 kg/m².   height is 1.676 m (5' 6\") and weight is 96.7 kg (213 lb 3.2 oz). His oral temperature is 98 °F (36.7 °C). His blood pressure is 112/66 and his pulse is 72. His respiration is 16 and oxygen saturation is 96%.   Vitals:    08/18/24 2041 08/19/24 0515 08/19/24 0609 08/19/24 0824   BP: 110/71   111/65 112/66   Pulse: 68 (!) 34 73 72   Resp: 18  20 16   Temp: 97.7 °F (36.5 °C)  97.7 °F (36.5 °C) 98 °F (36.7 °C)   TempSrc: Oral  Oral Oral   SpO2: 97%  96% 96%   Weight:   96.7 kg (213 lb 3.2 oz)    Height:            Anesthesia Evaluation     Patient summary reviewed and Nursing notes reviewed    No history of anesthetic complications   Airway   Mallampati: I  TM distance: >3 FB  Neck ROM: full  Dental    (+) upper dentures and lower dentures    Pulmonary - negative ROS and normal exam   Cardiovascular - normal exam  (+) hypertension well controlled, dysrhythmias (atrial fibrillation)    Neuro/Psych    (+)  neuromuscular disease (Alzheimer's dementia),        GI/Hepatic/Renal - negative ROS     Endo/Other    (+) hyperthyroidism  Abdominal  - normal exam                 Anesthesia Plan:   ASA:  3  Plan:   General  Airway:  LMA  Post-op Pain Management: IV analgesics and Oral pain medication  Informed Consent Plan and Risks Discussed With:  Patient, spouse and child/children      I have informed Itzel De Leon, his wife and son who translated for the patient of the nature of the anesthetic plan, benefits, risks including possible dental damage if relevant, major complications, and any alternative forms of anesthetic management.   All of the patient's questions were answered to the best of my ability. The patient desires the anesthetic management as planned.  KOBI BOWLING MD  8/19/2024 11:10 AM  Present on Admission:  **None**

## 2024-08-19 NOTE — TELEPHONE ENCOUNTER
I spoke to the pt's son in law Miah    He accepted an appt on 09/24/2024 at 3 pm for the pt     Son in law will be out of town the previous week.  He is the pt's  so we had to move the appointment to 5 weeks    Date time and [facility] location confirmed    Advised to arrive at 2:30 pm for check in    Your Appointments      Tuesday September 24, 2024 3:00 PM  Follow Up Visit with Kaiser Robles MD  [facility] [address] [address]  [phone]

## 2024-08-20 LAB
ANION GAP SERPL CALC-SCNC: 6 MMOL/L (ref 0–18)
ANTIBODY SCREEN: NEGATIVE
BASOPHILS # BLD AUTO: 0.01 X10(3) UL (ref 0–0.2)
BASOPHILS NFR BLD AUTO: 0.1 %
BUN BLD-MCNC: 22 MG/DL (ref 9–23)
BUN/CREAT SERPL: 21.4 (ref 10–20)
CALCIUM BLD-MCNC: 8.8 MG/DL (ref 8.7–10.4)
CHLORIDE SERPL-SCNC: 106 MMOL/L (ref 98–112)
CO2 SERPL-SCNC: 27 MMOL/L (ref 21–32)
CREAT BLD-MCNC: 1.03 MG/DL
DEPRECATED RDW RBC AUTO: 63.9 FL (ref 35.1–46.3)
EGFRCR SERPLBLD CKD-EPI 2021: 72 ML/MIN/1.73M2 (ref 60–?)
EOSINOPHIL # BLD AUTO: 0.01 X10(3) UL (ref 0–0.7)
EOSINOPHIL NFR BLD AUTO: 0.1 %
ERYTHROCYTE [DISTWIDTH] IN BLOOD BY AUTOMATED COUNT: 26.3 % (ref 11–15)
GLUCOSE BLD-MCNC: 136 MG/DL (ref 70–99)
HCT VFR BLD AUTO: 26.3 %
HGB BLD-MCNC: 7.6 G/DL
HGB BLD-MCNC: 8.3 G/DL
IMM GRANULOCYTES # BLD AUTO: 0.04 X10(3) UL (ref 0–1)
IMM GRANULOCYTES NFR BLD: 0.4 %
LYMPHOCYTES # BLD AUTO: 1.15 X10(3) UL (ref 1–4)
LYMPHOCYTES NFR BLD AUTO: 11.2 %
MCH RBC QN AUTO: 19.7 PG (ref 26–34)
MCHC RBC AUTO-ENTMCNC: 28.9 G/DL (ref 31–37)
MCV RBC AUTO: 68.3 FL
MONOCYTES # BLD AUTO: 0.82 X10(3) UL (ref 0.1–1)
MONOCYTES NFR BLD AUTO: 8 %
NEUTROPHILS # BLD AUTO: 8.23 X10 (3) UL (ref 1.5–7.7)
NEUTROPHILS # BLD AUTO: 8.23 X10(3) UL (ref 1.5–7.7)
NEUTROPHILS NFR BLD AUTO: 80.2 %
OSMOLALITY SERPL CALC.SUM OF ELEC: 293 MOSM/KG (ref 275–295)
PLATELET # BLD AUTO: 155 10(3)UL (ref 150–450)
PLATELETS.RETICULATED NFR BLD AUTO: 6.1 % (ref 0–7)
POTASSIUM SERPL-SCNC: 4.2 MMOL/L (ref 3.5–5.1)
RBC # BLD AUTO: 3.85 X10(6)UL
RH BLOOD TYPE: POSITIVE
SODIUM SERPL-SCNC: 139 MMOL/L (ref 136–145)
WBC # BLD AUTO: 10.3 X10(3) UL (ref 4–11)

## 2024-08-20 PROCEDURE — 99233 SBSQ HOSP IP/OBS HIGH 50: CPT | Performed by: HOSPITALIST

## 2024-08-20 RX ORDER — POLYETHYLENE GLYCOL 3350 17 G/17G
17 POWDER, FOR SOLUTION ORAL ONCE
Status: COMPLETED | OUTPATIENT
Start: 2024-08-20 | End: 2024-08-20

## 2024-08-20 RX ORDER — SODIUM CHLORIDE 9 MG/ML
INJECTION, SOLUTION INTRAVENOUS ONCE
Status: COMPLETED | OUTPATIENT
Start: 2024-08-20 | End: 2024-08-20

## 2024-08-20 NOTE — PLAN OF CARE
Safety precautions in place. Call light within reach. Patient's family at bedside.     Problem: Patient Centered Care  Goal: Patient preferences are identified and integrated in the patient's plan of care  Description: Interventions:  - Provide timely, complete, and accurate information to patient/family  - Incorporate patient and family knowledge, values, beliefs, and cultural backgrounds into the planning and delivery of care  - Encourage patient/family to participate in care and decision-making at the level they choose  - Honor patient and family perspectives and choices  Outcome: Progressing     Problem: Patient/Family Goals  Goal: Patient/Family Long Term Goal  Description: Patient's Long Term Goal: To go home    Interventions:  - Monitor vital signs and labs  - Pain management as needed  - Diagnostics per order  - Follow MD orders  - Administer medications as ordered  - Assists with activities of daily living   - Update patient and family on plan of care  - Discharge planning  - See additional Care Plan goals for specific interventions  Outcome: Progressing  Goal: Patient/Family Short Term Goal  Description: Patient's Short Term Goal: To get better    Interventions:   - Monitor vital signs and labs  - Pain management as needed  - Diagnostics per order  - Follow MD orders  - Administer medications as ordered  - Assists with activities of daily living   - Update patient and family on plan of care  - Discharge planning  - See additional Care Plan goals for specific interventions  Outcome: Progressing     Problem: SAFETY ADULT - FALL  Goal: Free from fall injury  Description: INTERVENTIONS:  - Assess pt frequently for physical needs  - Identify cognitive and physical deficits and behaviors that affect risk of falls.  - [place] fall precautions as indicated by assessment.  - Educate pt/family on patient safety including physical limitations  - Instruct pt to call for assistance with activity based on  assessment  - Modify environment to reduce risk of injury  - Provide assistive devices as appropriate  - Consider OT/PT consult to assist with strengthening/mobility  - Encourage toileting schedule  Outcome: Progressing     Problem: Altered Communication/Language Barrier  Goal: Patient/Family is able to understand and participate in their care  Description: Interventions:  - Assess communication ability and preferred communication style  - Implement communication aides and strategies  - Use visual cues when possible  - Listen attentively, be patient, do not interrupt  - Minimize distractions  - Allow time for understanding and response  - Establish method for patient to ask for assistance (call light)  - Provide an  as needed  - Communicate barriers and strategies to overcome with those who interact with patient  Outcome: Progressing     Problem: GASTROINTESTINAL - ADULT  Goal: Maintains or returns to baseline bowel function  Description: INTERVENTIONS:  - Assess bowel function  - Maintain adequate hydration with IV or PO as ordered and tolerated  - Evaluate effectiveness of GI medications  - Encourage mobilization and activity  - Obtain nutritional consult as needed  - Establish a toileting routine/schedule  - Consider collaborating with pharmacy to review patient's medication profile  Outcome: Progressing     Problem: HEMATOLOGIC - ADULT  Goal: Maintains hematologic stability  Description: INTERVENTIONS  - Assess for signs and symptoms of bleeding or hemorrhage  - Monitor labs and vital signs for trends  - Administer supportive blood products/factors, fluids and medications as ordered and appropriate  - Administer supportive blood products/factors as ordered and appropriate  Outcome: Progressing  Goal: Free from bleeding injury  Description: (Example usage: patient with low platelets)  INTERVENTIONS:  - Avoid intramuscular injections, enemas and rectal medication administration  - Ensure safe  mobilization of patient  - Hold pressure on venipuncture sites to achieve adequate hemostasis  - Assess for signs and symptoms of internal bleeding  - Monitor lab trends  - Patient is to report abnormal signs of bleeding to staff  - Avoid use of toothpicks and dental floss  - Use electric shaver for shaving  - Use soft bristle tooth brush  - Limit straining and forceful nose blowing  Outcome: Progressing

## 2024-08-20 NOTE — PROGRESS NOTES
[facility]  part of [facility]    Progress Note    Itzel De Leon Patient Status:  Inpatient    1940 MRN [id number]   Location [facility] 5SW/SE Attending Riccardo Roblero MD   Hosp Day # 5 PCP Mehrdad Solano,        Subjective:   Itzel De Leon is a(n) 83 year old male was seen and examined  Family at bedside, translating  No cp, sob, f,c,v abd pain or HA   No rectal pain  Pt c/o dizziness upon standing    Objective:   Blood pressure 97/47, pulse 67, temperature 98.2 °F (36.8 °C), temperature source Oral, resp. rate 18, height 5' 6\" (1.676 m), weight 213 lb 3.2 oz (96.7 kg), SpO2 94%.    GENERAL:  Alert and oriented to time, place, and person,  Does not appear to be in distress.    HEENT:  Atraumatic.  Oropharynx clear.  Moist mucous membranes.    NECK:  Supple.  No lymphadenopathy.  Jugular venous distention and pulsation noted.  LUNGS:  Diminished breathing sounds, both lung bases.  HEART:  Irregular rhythm.  S1 and S2 auscultated.  Rate controlled on monitor.  ABDOMEN:  Soft, obese.  Positive bowel sounds.  No distention, no tenderness.  EXTREMITIES:  Edema +1 both legs.  No clubbing or cyanosis.    NEUROLOGIC: Motor and sensory intact.     Current Inpatient Medications:     Current Facility-Administered Medications:     sodium chloride 0.9% infusion, , Intravenous, Once    oxyCODONE immediate release tab 2.5 mg, 2.5 mg, Oral, Q4H PRN **OR** oxyCODONE immediate release tab 5 mg, 5 mg, Oral, Q4H PRN    HYDROmorphone (Dilaudid) 1 MG/ML injection 0.2 mg, 0.2 mg, Intravenous, Q2H PRN **OR** HYDROmorphone (Dilaudid) 1 MG/ML injection 0.4 mg, 0.4 mg, Intravenous, Q2H PRN    [Held by provider] metoprolol tartrate (Lopressor) partial tab 12.5 mg, 12.5 mg, Oral, 2x Daily(Beta Blocker)    furosemide (Lasix) tab 20 mg, 20 mg, Oral, QOD    lisinopril (Prinivil; Zestril) tab 5 mg, 5 mg, Oral, Daily    propylthiouracil (PTU) tab 100 mg, 100 mg, Oral, TID    risperiDONE (RisperDAL) tab  0.5 mg, 0.5 mg, Oral, Nightly    rosuvastatin (Crestor) tab 10 mg, 10 mg, Oral, Nightly    pantoprazole (Protonix) 40 mg in sodium chloride 0.9% PF 10 mL IV push, 40 mg, Intravenous, Daily    ondansetron (Zofran) 4 MG/2ML injection 4 mg, 4 mg, Intravenous, Q6H PRN    temazepam (Restoril) cap 15 mg, 15 mg, Oral, Nightly PRN    Assessment and Plan:   1.       Gastrointestinal bleed.  GI has been consulted   Of note pt with hx of hemorrhoids   These were seen on colonoscopy, found to have large prolapased internal hemorrhoids  Not actively bleeding  Surgery consulted  Now PROCTOSCOPY AND HEMORRHOIDECTOMY POD#1  Doing well, no bleeding    2.       Posthemorrhagic microcytic anemia  Cont to transfuse to get Hgb >7  Pt with complaints of dizziness, will transfuse 1 U PRBC to keep above 8  Will check orthostats after    3.       Fluid overload status  Rec'd IV lasix, now on PO  Due to HFrEF, evident on echo  Cardiology following  Cont strict IsOs    4.       Chronic atrial fibrillation.  Resume AC tomorrow  Rates controlled currently  Cont tele monitoring    5.       Hyperthyroidism  Cont PTU  TSH elevated, Ft4 wnl    6.        HTN  Stable for now  Cont to monitor    DVT Ppx: none due to beed, resume in AM  Full code    MDM: High       Results:     Recent Labs   Lab 08/18/24  0653 08/19/24  0823 08/20/24  0545   RBC 3.81 3.92 3.85   HGB 7.6* 7.7* 7.6*   HCT 25.7* 27.3* 26.3*   MCV 67.5* 69.6* 68.3*   MCH 19.9* 19.6* 19.7*   MCHC 29.6* 28.2* 28.9*   RDW 25.9* 26.0* 26.3*   NEPRELIM 2.85 2.88 8.23*   WBC 5.4 4.8 10.3   .0 159.0 155.0         Recent Labs   Lab 08/18/24  0654 08/19/24  0823 08/20/24  0545   * 102*  102* 136*   BUN 24* 20  20 22   CREATSERUM 0.95 0.89  0.89 1.03   EGFRCR 79 85  85 72   CA 9.2 9.1  9.1 8.8    143  143 139   K 3.8  3.8 4.1  4.1 4.2    109  109 106   CO2 30.0 28.0  28.0 27.0         Imaging:   No results found.          Riccardo Roblero MD  8/20/2024

## 2024-08-20 NOTE — PROGRESS NOTES
Progress Note  Itzel De Leon Patient Status:  Inpatient    1940 MRN [id number]   Location [facility] 5SW/SE Attending Riccardo Roblero MD   Hosp Day # 5 PCP Mehrdad Solano, DO     Subjective:  Pt is Slovak speaking - family at bedside providing interpretation. Pt denies chest pain, SOB, or palpitations. States he is occasionally lightheaded with standing, ambulation. Denies currently. He is POD 1 sp hemorrhoidectomy.    Objective:  BP 97/47 (BP Location: Right arm)   Pulse 67   Temp 98.2 °F (36.8 °C) (Oral)   Resp 18   Ht 5' 6\" (1.676 m)   Wt 213 lb 3.2 oz (96.7 kg)   SpO2 94%   BMI 34.41 kg/m²     Telemetry: afib 50's, 40's at times, occ pause (max 2.2 sec), occ PVC's    Intake/Output:    Intake/Output Summary (Last 24 hours) at 2024 1139  Last data filed at 2024 0942  Gross per 24 hour   Intake 950 ml   Output 5 ml   Net 945 ml       Last 3 Weights   24 1126 213 lb 3.2 oz (96.7 kg)   24 0609 213 lb 3.2 oz (96.7 kg)   24 0711 221 lb (100.2 kg)   24 0513 215 lb 8 oz (97.8 kg)   08/15/24 1601 219 lb 8 oz (99.6 kg)   08/15/24 1307 220 lb (99.8 kg)   24 1146 221 lb (100.2 kg)   24 1654 222 lb (100.7 kg)       Labs:  Recent Labs   Lab 24  0654 24  0823 24  0545   * 102*  102* 136*   BUN 24* 20  20 22   CREATSERUM 0.95 0.89  0.89 1.03   EGFRCR 79 85  85 72   CA 9.2 9.1  9.1 8.8    143  143 139   K 3.8  3.8 4.1  4.1 4.2    109  109 106   CO2 30.0 28.0  28.0 27.0     Recent Labs   Lab 24  0653 24  0823 24  0545   RBC 3.81 3.92 3.85   HGB 7.6* 7.7* 7.6*   HCT 25.7* 27.3* 26.3*   MCV 67.5* 69.6* 68.3*   MCH 19.9* 19.6* 19.7*   MCHC 29.6* 28.2* 28.9*   RDW 25.9* 26.0* 26.3*   NEPRELIM 2.85 2.88 8.23*   WBC 5.4 4.8 10.3   .0 159.0 155.0         Recent Labs   Lab 08/15/24  1429   TROPHS 9       Diagnostics:  No results found.   Review of Systems   Cardiovascular:  Negative for chest  pain, dyspnea on exertion, leg swelling, orthopnea, palpitations and paroxysmal nocturnal dyspnea.   Respiratory:  Negative for cough and shortness of breath.    Neurological:  Positive for light-headedness.       Physical Exam:    Gen: alert, oriented x 3, NAD  Heent: pupils equal, reactive. Mucous membranes moist.   Neck: no jvd  Cardiac: regular rate and rhythm, normal S1,S2, no murmur, clicks, rub or gallop  Lungs: CTA  Abd: soft, NT/ND +bs  Ext: no edema  Skin: Warm, dry  Neuro: No focal deficits      Medications:     [Held by provider] metoprolol tartrate  12.5 mg Oral 2x Daily(Beta Blocker)    furosemide  20 mg Oral QOD    lisinopril  5 mg Oral Daily    propylthiouracil  100 mg Oral TID    risperiDONE  0.5 mg Oral Nightly    rosuvastatin  10 mg Oral Nightly    pantoprazole  40 mg Intravenous Daily       Assessment:  Acute Anemia/GIB - now s/p Hemorrhoidectomy POD 1  Hgb 6.3 on admit; s/p 3 units PRBC - Hgb 7.6 this am  S/p colonoscopy 8/16 - large internal hemorrhoids  PPI BID  Xarelto on hold  Permanent Afib, Slow VR, Tachy/Steve  BB on hold d/t baseline bradycardia; episode of AFRVR 150's-170's overnight with ambulation  A/C with Xarelto - on hold d/t anemia, post op  Acute HFmrEF, Mod-Sev TR, Mild MR  Echo w/LVEF 45-50%, no RWMA  , 217  S/P IV lasix x1   On lisinopril  Historically on furosemide 40mg po daily at home - currently on 20mg po every other day  Hypertension  BP on low end this am - lisinopril held   Hyperlipidemia - LDL 68, Crestor 10mg    Plan:  Xarelto on hold d/t anemia, post op hemorrhoidectomy. Resume when ok with general surgery team. Would benefit from future evaluation for Watchman  Afib with episodes of bradycardia. Did have brief episodes of AFRVR 150's-170's overnight - likely r/t acute post op setting. Keep BB on hold for now.  Continue lisinopril, statin  Continue furosemide 20mg every other day for now - appears compensated on exam and BP on low side today.  With mildly  reduced EF and severe TR, may benefit from further OP evaluation for ischemic evaluation and possible TR intervention (clip vs Evoque)     Plan of care discussed with patient, RN.    JIN Koch  8/20/2024  11:39 AM  [phone] [facility]  [phone] [facility]

## 2024-08-20 NOTE — PROGRESS NOTES
[facility]  part of [facility]    Progress Note    Itzel De Leon Patient Status:  Inpatient    1940 MRN [id number]   Location [facility] 5SW/SE Attending Riccardo Roblero MD   Hosp Day # 5 PCP Mehrdad Solano, DO     Subjective:  Feels ok   no bm    Objective/Physical Exam:  General: Alert, orientated x3.  Cooperative.  No apparent distress.  Vital Signs:  Blood pressure 97/47, pulse 67, temperature 98.2 °F (36.8 °C), temperature source Oral, resp. rate 18, height 5' 6\" (1.676 m), weight 213 lb 3.2 oz (96.7 kg), SpO2 94%.    Abdomen:  rectum   no swelling or bleeding  Labs:  Lab Results   Component Value Date    WBC 10.3 2024    HGB 7.6 (L) 2024    HCT 26.3 (L) 2024    .0 2024    CREATSERUM 1.03 2024    BUN 22 2024     2024    K 4.2 2024     2024    CO2 27.0 2024     (H) 2024    CA 8.8 2024    ALB 4.0 2024    ALKPHO 52 2023    BILT 0.5 2023    TP 7.3 2023    AST 18 2023    ALT 15 (L) 2023    T4F 0.9 2024    TSH 6.078 (H) 2024    MG 1.9 2024    PHOS 4.1 2024       Imaging:  CARD ECHO 2D DOPPLER CONTRAST (CPT=93306)    Result Date: 2024  Transthoracic Echocardiogram Name:Itzel De Leon Date: 2024 :  1940 Ht:  (66in)  BP: 113 / 62 MRN:  [id number]    Age:  83years    Wt:  (216lb) HR: Loc:  [facility]       Gndr: M          BSA: 2.06m^2 Sonographer: Phyllis AVILES Ordering:    Aimee Carl Consulting:  Joe Borges ---------------------------------------------------------------------------- History/Indications:   Atrial fibrillation.  Heart failure. GI bleed. ---------------------------------------------------------------------------- Procedure information:  A transthoracic complete 2D study was performed. Additional evaluation included M-mode, complete spectral Doppler, and color Doppler.  Patient status:   Inpatient.  Location:  Bedside.    Comparison was made to the study of 10/20/2016.    This was a routine study. Transthoracic echocardiography for diagnosis and ventricular function evaluation. Image quality was suboptimal. The study was technically limited due to poor acoustic window availability. Intravenous contrast (Definity) was administered to evaluate left ventricular function and opacify the LV. ECG rhythm:   Atrial fibrillation ---------------------------------------------------------------------------- Conclusions: 1. Left ventricle: The cavity size was normal. Wall thickness was mildly    increased. Systolic function was mildly reduced. The estimated ejection    fraction was 45-50%, by biplane method of disks. No diagnostic evidence    for regional wall motion abnormalities. Unable to assess LV diastolic    function due to heart rhythm. 2. Right ventricle: The cavity size was increased. Systolic function was    normal. 3. Left atrium: The atrium was markedly dilated. 4. Right atrium: The atrium was dilated. 5. Aortic valve: The annulus was calcified. The valve was trileaflet. The    leaflets were mildly thickened. 6. Mitral valve: There was mild regurgitation. 7. Tricuspid valve: There was moderate-severe regurgitation. * ---------------------------------------------------------------------------- * Findings: Left ventricle:  The cavity size was normal. Wall thickness was mildly increased. Systolic function was mildly reduced. The estimated ejection fraction was 45-50%, by biplane method of disks. No diagnostic evidence for diffuse regional wall motion abnormalities. No diagnostic evidence for regional wall motion abnormalities. Unable to assess LV diastolic function due to heart rhythm. Ventricular septum:   Thickness was normal. Left atrium:  The atrium was markedly dilated. Right ventricle:  The cavity size was increased. Systolic function was normal. Right atrium:  The atrium was dilated. Mitral  valve:  The valve was structurally normal. The leaflets were normal thickness. Leaflet separation was normal.  Doppler:  Transvalvular velocity was within the normal range. There was no evidence for stenosis. There was mild regurgitation. Aortic valve:  The annulus was calcified. The valve was trileaflet. The leaflets were mildly thickened. Cusp separation was normal.  Doppler: Transvalvular velocity was within the normal range. There was no evidence for stenosis. There was no significant regurgitation. Tricuspid valve:  The valve is structurally normal. Leaflet separation was normal.  Doppler:  Transvalvular velocity was within the normal range. There was no evidence for stenosis. There was moderate-severe regurgitation. Pulmonic valve:   The valve is structurally normal. Cusp separation was normal.  Doppler:  Transvalvular velocity was within the normal range. There was no evidence for stenosis. There was no significant regurgitation. Pericardium:   There was no pericardial effusion. Aorta: Aortic root: The aortic root was normal-sized. Ascending aorta: The ascending aorta was normal. Pulmonary arteries: The main pulmonary artery was normal-sized.  Systolic pressure could not be accurately estimated. Systemic veins: Inferior vena cava: The IVC was normally collapsible and normal-sized. ---------------------------------------------------------------------------- Measurements  Left ventricle                    Value        Ref  IVS thickness, ED, PLAX           0.9   cm     0.6 - 1.0  LV ID, ED, PLAX                   4.8   cm     4.2 - 5.8  LV ID, ES, PLAX                   3.4   cm     2.5 - 4.0  LV PW thickness, ED, PLAX     (H) 1.1   cm     0.6 - 1.0  IVS/LV PW ratio, ED, PLAX         0.82         ---------  LV PW/LV ID ratio, ED, PLAX       0.23         ---------  LV ejection fraction              56    %      52 - 72  Stroke volume/bsa, 2D             35    ml/m^2 ---------  LV end-diastolic volume, 1-p       80    ml     69 - 185  A4C  LV ejection fraction, 1-p A4C     53    %      46 - 74  Stroke volume, 1-p A4C            43    ml     ---------  LV end-diastolic volume/bsa,      39    ml/m^2 37 - 93  1-p A4C  Stroke volume/bsa, 1-p A4C        21    ml/m^2 ---------  LV end-diastolic volume, 2-p      87    ml     62 - 150  LV end-systolic volume, 2-p       45    ml     21 - 61  LV ejection fraction, 2-p     (L) 48    %      52 - 72  Stroke volume, 2-p                42    ml     ---------  LV end-diastolic volume/bsa,      42    ml/m^2 34 - 74  2-p  LV end-systolic volume/bsa,       22    ml/m^2 11 - 31  2-p  Stroke volume/bsa, 2-p            20.4  ml/m^2 ---------  LV e', lateral                    12.5  cm/sec >=10.0  LV E/e', lateral                  10           <=13  LV e', medial                     9.1   cm/sec >=7.0  LV E/e', medial                   14           ---------  LV e', average                    10.8  cm/sec ---------  LV E/e', average                  12           <=14  LVOT                              Value        Ref  LVOT ID                           2.3   cm     ---------  LVOT peak velocity, S             0.78  m/sec  ---------  LVOT VTI, S                       17.6  cm     ---------  LVOT peak gradient, S             2     mm Hg  ---------  LVOT mean gradient, S             1     mm Hg  ---------  Stroke volume (SV), LVOT DP       73    ml     ---------  Stroke index (SV/bsa), LVOT       35    ml/m^2 ---------  DP  Aortic root                       Value        Ref  Aortic root ID                    3.1   cm     2.7 - 4.2  Aortic root ID, STJ, ED           2.4   cm     2.3 - 3.5  Aortic root ID, ED, MM            3.9   cm     ---------  Ascending aorta                   Value        Ref  Ascending aorta ID                3.2   cm     2.2 - 3.8  Left atrium                       Value        Ref  LA ID, A-P, ES                (H) 4.8   cm     3.0 - 4.0  LA volume, S                  (H) 127    ml     18 - 58  LA volume/bsa, S              (H) 62    ml/m^2 16 - 34  LA volume, ES, 1-p A4C        (H) 102   ml     18 - 58  LA volume, ES, 1-p A2C        (H) 158   ml     18 - 58  LA volume, ES, A/L                137   ml     ---------  LA volume/bsa, ES, A/L        (H) 66    ml/m^2 16 - 34  LA/aortic root ratio              1.55         ---------  Mitral valve                      Value        Ref  Mitral E-wave peak velocity       1.25  m/sec  ---------  Mitral deceleration time          148   ms     ---------  Mitral peak gradient, D           6     mm Hg  ---------  Mitral regurg peak velocity       363   cm/sec ---------  Mitral peak LV-LA gradient, S     52.71 mm Hg  ---------  Mitral maximal regurg             268   cm/sec ---------  velocity, PISA  Tricuspid valve                   Value        Ref  Tricuspid regurg peak         (H) 2.98  m/sec  <=2.8  velocity  Tricuspid peak RV-RA gradient     36    mm Hg  ---------  Right atrium                      Value        Ref  RA ID, S-I, ES, A4C           (H) 6.8   cm     3.4 - 5.3  RA ID/bsa, S-I, ES, A4C       (H) 3.3   cm/m^2 1.8 - 3.0  RA area, ES, A4C              (H) 28    cm^2   10 - 18  RA volume, ES, 1-p A4C            94    ml     ---------  RA volume/bsa, ES, 1-p A4C    (H) 45    ml/m^2 11 - 39  Inferior vena cava                Value        Ref  ID                                2.0   cm     <=2.1  Right ventricle                   Value        Ref  TAPSE, MM                     (L) 1.68  cm     >=1.70  RV s', lateral                    10.3  cm/sec >=9.5 Legend: (L)  and  (H)  kusum values outside specified reference range. ---------------------------------------------------------------------------- Prepared and electronically signed by Fernando Hodge 08/16/2024 10:49     XR CHEST AP PORTABLE  (CPT=71045)    Result Date: 8/15/2024  PROCEDURE: XR CHEST AP PORTABLE  (CPT=71045) TIME: 1349 hours  COMPARISON: [facility], X CHEST PA  LAT ROUTINE, 9/04/2016, 11:44 AM.  INDICATIONS: Shortness of breath today.  TECHNIQUE:   Single view.   FINDINGS:  CARDIAC/VASC: Cardiomegaly.  Prominent central vasculature  MEDIAST/HARDIK:   Atherosclerotic aorta with no visible aneurysm.  LUNGS/PLEURA: No acute pulmonary disease.  Prominent basilar pulmonary markings BONES: Mild scoliosis with mild degenerative disc disease and spondylosis.  OTHER: Negative.          CONCLUSION:  1. Cardiomegaly and prominent central vasculature. 2. Prominent basilar pulmonary markings.  No acute pulmonary disease.    Dictated by (CST): Rob Isaac MD on 8/15/2024 at 2:13 PM     Finalized by (CST): Rob Isaac MD on 8/15/2024 at 2:14 PM            Assessment/Plan:  Patient Active Problem List   Diagnosis    Hyperthyroidism    Atrial fibrillation (HCC)    Age-related nuclear cataract of left eye    Edema    Pure hypercholesterolemia    Alzheimer's disease, unspecified (HCC)    Thrombocytopenia (HCC)    Gastrointestinal hemorrhage, unspecified gastrointestinal hemorrhage type    Severe anemia    GI bleed   7.6 hgb  Ok for discharge from surgical standpoint  Instructions given  Await BM  MOM at home  To call for appt for 10 days    RASHIDA RAMIRES MD  8/20/2024  9:52 AM

## 2024-08-20 NOTE — PLAN OF CARE
Problem: Patient Centered Care  Goal: Patient preferences are identified and integrated in the patient's plan of care  Description: Interventions:  - What would you like us to know as we care for you? From home with daughter  - Provide timely, complete, and accurate information to patient/family  - Incorporate patient and family knowledge, values, beliefs, and cultural backgrounds into the planning and delivery of care  - Encourage patient/family to participate in care and decision-making at the level they choose  - Honor patient and family perspectives and choices  Outcome: Progressing     Problem: Patient/Family Goals  Goal: Patient/Family Long Term Goal  Description: Patient's Long Term Goal: To go home    Interventions:  - Monitor vital signs and labs  - Pain management as needed  - Diagnostics per order  - Follow MD orders  - Administer medications as ordered  - Assists with activities of daily living   - Update patient and family on plan of care  - Discharge planning  - See additional Care Plan goals for specific interventions  Outcome: Progressing  Goal: Patient/Family Short Term Goal  Description: Patient's Short Term Goal: To get better    Interventions:   - Monitor vital signs and labs  - Pain management as needed  - Diagnostics per order  - Follow MD orders  - Administer medications as ordered  - Assists with activities of daily living   - Update patient and family on plan of care  - Discharge planning  - See additional Care Plan goals for specific interventions  Outcome: Progressing     Problem: SAFETY ADULT - FALL  Goal: Free from fall injury  Description: INTERVENTIONS:  - Assess pt frequently for physical needs  - Identify cognitive and physical deficits and behaviors that affect risk of falls.  - [place] fall precautions as indicated by assessment.  - Educate pt/family on patient safety including physical limitations  - Instruct pt to call for assistance with activity based on assessment  -  Modify environment to reduce risk of injury  - Provide assistive devices as appropriate  - Consider OT/PT consult to assist with strengthening/mobility  - Encourage toileting schedule  Outcome: Progressing     Problem: Altered Communication/Language Barrier  Goal: Patient/Family is able to understand and participate in their care  Description: Interventions:  - Assess communication ability and preferred communication style  - Implement communication aides and strategies  - Use visual cues when possible  - Listen attentively, be patient, do not interrupt  - Minimize distractions  - Allow time for understanding and response  - Establish method for patient to ask for assistance (call light)  - Provide an  as needed  - Communicate barriers and strategies to overcome with those who interact with patient  Outcome: Progressing     Problem: GASTROINTESTINAL - ADULT  Goal: Maintains or returns to baseline bowel function  Description: INTERVENTIONS:  - Assess bowel function  - Maintain adequate hydration with IV or PO as ordered and tolerated  - Evaluate effectiveness of GI medications  - Encourage mobilization and activity  - Obtain nutritional consult as needed  - Establish a toileting routine/schedule  - Consider collaborating with pharmacy to review patient's medication profile  Outcome: Progressing     Problem: HEMATOLOGIC - ADULT  Goal: Maintains hematologic stability  Description: INTERVENTIONS  - Assess for signs and symptoms of bleeding or hemorrhage  - Monitor labs and vital signs for trends  - Administer supportive blood products/factors, fluids and medications as ordered and appropriate  - Administer supportive blood products/factors as ordered and appropriate  Outcome: Progressing  Goal: Free from bleeding injury  Description: (Example usage: patient with low platelets)  INTERVENTIONS:  - Avoid intramuscular injections, enemas and rectal medication administration  - Ensure safe mobilization of  patient  - Hold pressure on venipuncture sites to achieve adequate hemostasis  - Assess for signs and symptoms of internal bleeding  - Monitor lab trends  - Patient is to report abnormal signs of bleeding to staff  - Avoid use of toothpicks and dental floss  - Use electric shaver for shaving  - Use soft bristle tooth brush  - Limit straining and forceful nose blowing  Outcome: Progressing

## 2024-08-21 LAB
BLOOD TYPE BARCODE: 5100
UNIT VOLUME: 350 ML

## 2024-08-21 PROCEDURE — 99233 SBSQ HOSP IP/OBS HIGH 50: CPT | Performed by: INTERNAL MEDICINE

## 2024-08-21 NOTE — PROGRESS NOTES
[facility]  part of [facility]    Progress Note    Itzel De Leon Patient Status:  Inpatient    1940 MRN [id number]   Location [facility] 5SW/SE Attending Lance Robin MD   Hosp Day # 6 PCP Mehrdad Solano, DO     Subjective:  Feels ok   had bm    Objective/Physical Exam:  General: Alert, orientated x3.  Cooperative.  No apparent distress.  Vital Signs:  Blood pressure 104/50, pulse 73, temperature 99.5 °F (37.5 °C), temperature source Oral, resp. rate 17, height 5' 6\" (1.676 m), weight 216 lb (98 kg), SpO2 95%.    Abdomen:  rectal  no bleeding  Labs:  Lab Results   Component Value Date    WBC 10.3 2024    HGB 8.3 (L) 2024    HCT 26.3 (L) 2024    .0 2024    CREATSERUM 1.03 2024    BUN 22 2024     2024    K 4.2 2024     2024    CO2 27.0 2024     (H) 2024    CA 8.8 2024    ALB 4.0 2024    ALKPHO 52 2023    BILT 0.5 2023    TP 7.3 2023    AST 18 2023    ALT 15 (L) 2023    T4F 0.9 2024    TSH 6.078 (H) 2024    MG 1.9 2024    PHOS 4.1 2024       Imaging:  CARD ECHO 2D DOPPLER CONTRAST (CPT=93306)    Result Date: 2024  Transthoracic Echocardiogram Name:Itzel De Leon Date: 2024 :  1940 Ht:  (66in)  BP: 113 / 62 MRN:  [id number]    Age:  83years    Wt:  (216lb) HR: Loc:  [facility]       Gndr: M          BSA: 2.06m^2 Sonographer: Phyllis AVILES Ordering:    Aimee Carl Consulting:  Joe Borges ---------------------------------------------------------------------------- History/Indications:   Atrial fibrillation.  Heart failure. GI bleed. ---------------------------------------------------------------------------- Procedure information:  A transthoracic complete 2D study was performed. Additional evaluation included M-mode, complete spectral Doppler, and color Doppler.  Patient status:  Inpatient.  Location:   Bedside.    Comparison was made to the study of 10/20/2016.    This was a routine study. Transthoracic echocardiography for diagnosis and ventricular function evaluation. Image quality was suboptimal. The study was technically limited due to poor acoustic window availability. Intravenous contrast (Definity) was administered to evaluate left ventricular function and opacify the LV. ECG rhythm:   Atrial fibrillation ---------------------------------------------------------------------------- Conclusions: 1. Left ventricle: The cavity size was normal. Wall thickness was mildly    increased. Systolic function was mildly reduced. The estimated ejection    fraction was 45-50%, by biplane method of disks. No diagnostic evidence    for regional wall motion abnormalities. Unable to assess LV diastolic    function due to heart rhythm. 2. Right ventricle: The cavity size was increased. Systolic function was    normal. 3. Left atrium: The atrium was markedly dilated. 4. Right atrium: The atrium was dilated. 5. Aortic valve: The annulus was calcified. The valve was trileaflet. The    leaflets were mildly thickened. 6. Mitral valve: There was mild regurgitation. 7. Tricuspid valve: There was moderate-severe regurgitation. * ---------------------------------------------------------------------------- * Findings: Left ventricle:  The cavity size was normal. Wall thickness was mildly increased. Systolic function was mildly reduced. The estimated ejection fraction was 45-50%, by biplane method of disks. No diagnostic evidence for diffuse regional wall motion abnormalities. No diagnostic evidence for regional wall motion abnormalities. Unable to assess LV diastolic function due to heart rhythm. Ventricular septum:   Thickness was normal. Left atrium:  The atrium was markedly dilated. Right ventricle:  The cavity size was increased. Systolic function was normal. Right atrium:  The atrium was dilated. Mitral valve:  The valve was  structurally normal. The leaflets were normal thickness. Leaflet separation was normal.  Doppler:  Transvalvular velocity was within the normal range. There was no evidence for stenosis. There was mild regurgitation. Aortic valve:  The annulus was calcified. The valve was trileaflet. The leaflets were mildly thickened. Cusp separation was normal.  Doppler: Transvalvular velocity was within the normal range. There was no evidence for stenosis. There was no significant regurgitation. Tricuspid valve:  The valve is structurally normal. Leaflet separation was normal.  Doppler:  Transvalvular velocity was within the normal range. There was no evidence for stenosis. There was moderate-severe regurgitation. Pulmonic valve:   The valve is structurally normal. Cusp separation was normal.  Doppler:  Transvalvular velocity was within the normal range. There was no evidence for stenosis. There was no significant regurgitation. Pericardium:   There was no pericardial effusion. Aorta: Aortic root: The aortic root was normal-sized. Ascending aorta: The ascending aorta was normal. Pulmonary arteries: The main pulmonary artery was normal-sized.  Systolic pressure could not be accurately estimated. Systemic veins: Inferior vena cava: The IVC was normally collapsible and normal-sized. ---------------------------------------------------------------------------- Measurements  Left ventricle                    Value        Ref  IVS thickness, ED, PLAX           0.9   cm     0.6 - 1.0  LV ID, ED, PLAX                   4.8   cm     4.2 - 5.8  LV ID, ES, PLAX                   3.4   cm     2.5 - 4.0  LV PW thickness, ED, PLAX     (H) 1.1   cm     0.6 - 1.0  IVS/LV PW ratio, ED, PLAX         0.82         ---------  LV PW/LV ID ratio, ED, PLAX       0.23         ---------  LV ejection fraction              56    %      52 - 72  Stroke volume/bsa, 2D             35    ml/m^2 ---------  LV end-diastolic volume, 1-p      80    ml     69 - 185   A4C  LV ejection fraction, 1-p A4C     53    %      46 - 74  Stroke volume, 1-p A4C            43    ml     ---------  LV end-diastolic volume/bsa,      39    ml/m^2 37 - 93  1-p A4C  Stroke volume/bsa, 1-p A4C        21    ml/m^2 ---------  LV end-diastolic volume, 2-p      87    ml     62 - 150  LV end-systolic volume, 2-p       45    ml     21 - 61  LV ejection fraction, 2-p     (L) 48    %      52 - 72  Stroke volume, 2-p                42    ml     ---------  LV end-diastolic volume/bsa,      42    ml/m^2 34 - 74  2-p  LV end-systolic volume/bsa,       22    ml/m^2 11 - 31  2-p  Stroke volume/bsa, 2-p            20.4  ml/m^2 ---------  LV e', lateral                    12.5  cm/sec >=10.0  LV E/e', lateral                  10           <=13  LV e', medial                     9.1   cm/sec >=7.0  LV E/e', medial                   14           ---------  LV e', average                    10.8  cm/sec ---------  LV E/e', average                  12           <=14  LVOT                              Value        Ref  LVOT ID                           2.3   cm     ---------  LVOT peak velocity, S             0.78  m/sec  ---------  LVOT VTI, S                       17.6  cm     ---------  LVOT peak gradient, S             2     mm Hg  ---------  LVOT mean gradient, S             1     mm Hg  ---------  Stroke volume (SV), LVOT DP       73    ml     ---------  Stroke index (SV/bsa), LVOT       35    ml/m^2 ---------  DP  Aortic root                       Value        Ref  Aortic root ID                    3.1   cm     2.7 - 4.2  Aortic root ID, STJ, ED           2.4   cm     2.3 - 3.5  Aortic root ID, ED, MM            3.9   cm     ---------  Ascending aorta                   Value        Ref  Ascending aorta ID                3.2   cm     2.2 - 3.8  Left atrium                       Value        Ref  LA ID, A-P, ES                (H) 4.8   cm     3.0 - 4.0  LA volume, S                  (H) 127   ml     18 - 58  LA  volume/bsa, S              (H) 62    ml/m^2 16 - 34  LA volume, ES, 1-p A4C        (H) 102   ml     18 - 58  LA volume, ES, 1-p A2C        (H) 158   ml     18 - 58  LA volume, ES, A/L                137   ml     ---------  LA volume/bsa, ES, A/L        (H) 66    ml/m^2 16 - 34  LA/aortic root ratio              1.55         ---------  Mitral valve                      Value        Ref  Mitral E-wave peak velocity       1.25  m/sec  ---------  Mitral deceleration time          148   ms     ---------  Mitral peak gradient, D           6     mm Hg  ---------  Mitral regurg peak velocity       363   cm/sec ---------  Mitral peak LV-LA gradient, S     52.71 mm Hg  ---------  Mitral maximal regurg             268   cm/sec ---------  velocity, PISA  Tricuspid valve                   Value        Ref  Tricuspid regurg peak         (H) 2.98  m/sec  <=2.8  velocity  Tricuspid peak RV-RA gradient     36    mm Hg  ---------  Right atrium                      Value        Ref  RA ID, S-I, ES, A4C           (H) 6.8   cm     3.4 - 5.3  RA ID/bsa, S-I, ES, A4C       (H) 3.3   cm/m^2 1.8 - 3.0  RA area, ES, A4C              (H) 28    cm^2   10 - 18  RA volume, ES, 1-p A4C            94    ml     ---------  RA volume/bsa, ES, 1-p A4C    (H) 45    ml/m^2 11 - 39  Inferior vena cava                Value        Ref  ID                                2.0   cm     <=2.1  Right ventricle                   Value        Ref  TAPSE, MM                     (L) 1.68  cm     >=1.70  RV s', lateral                    10.3  cm/sec >=9.5 Legend: (L)  and  (H)  kusum values outside specified reference range. ---------------------------------------------------------------------------- Prepared and electronically signed by Fernando Hodge 08/16/2024 10:49     XR CHEST AP PORTABLE  (CPT=71045)    Result Date: 8/15/2024  PROCEDURE: XR CHEST AP PORTABLE  (CPT=71045) TIME: 1349 hours  COMPARISON: [facility], X CHEST PA LAT ROUTINE,  9/04/2016, 11:44 AM.  INDICATIONS: Shortness of breath today.  TECHNIQUE:   Single view.   FINDINGS:  CARDIAC/VASC: Cardiomegaly.  Prominent central vasculature  MEDIAST/HARDIK:   Atherosclerotic aorta with no visible aneurysm.  LUNGS/PLEURA: No acute pulmonary disease.  Prominent basilar pulmonary markings BONES: Mild scoliosis with mild degenerative disc disease and spondylosis.  OTHER: Negative.          CONCLUSION:  1. Cardiomegaly and prominent central vasculature. 2. Prominent basilar pulmonary markings.  No acute pulmonary disease.    Dictated by (CST): Rob Isaac MD on 8/15/2024 at 2:13 PM     Finalized by (CST): Rob Isaac MD on 8/15/2024 at 2:14 PM            Assessment/Plan:  Patient Active Problem List   Diagnosis    Hyperthyroidism    Atrial fibrillation (HCC)    Age-related nuclear cataract of left eye    Edema    Pure hypercholesterolemia    Alzheimer's disease, unspecified (HCC)    Thrombocytopenia (HCC)    Gastrointestinal hemorrhage, unspecified gastrointestinal hemorrhage type    Severe anemia    GI bleed   8.2 hgb    Doing well    Ok for anti-coag  Ok for discharge from surgical standpoint   Instructions given  To see me one week    RASHIDA RAMIRES MD  8/21/2024  9:12 AM

## 2024-08-21 NOTE — PLAN OF CARE
Patient alert and oriented x4. [place] speaking. Family at bedside to interpret. Received 1uPRBC. Repeat hgb is 8.3. Orthostatic vitals performed post blood transfusion. Orthostatic negative. Pt reports improved dizziness after blood transfusion. Vitals WNL. No acute events overnight. Plan to resume xarelto today. No BM overnight.     Problem: Patient Centered Care  Goal: Patient preferences are identified and integrated in the patient's plan of care  Description: Interventions:  - What would you like us to know as we care for you? From home with daughter  - Provide timely, complete, and accurate information to patient/family  - Incorporate patient and family knowledge, values, beliefs, and cultural backgrounds into the planning and delivery of care  - Encourage patient/family to participate in care and decision-making at the level they choose  - Honor patient and family perspectives and choices  Outcome: Progressing     Problem: SAFETY ADULT - FALL  Goal: Free from fall injury  Description: INTERVENTIONS:  - Assess pt frequently for physical needs  - Identify cognitive and physical deficits and behaviors that affect risk of falls.  - [place] fall precautions as indicated by assessment.  - Educate pt/family on patient safety including physical limitations  - Instruct pt to call for assistance with activity based on assessment  - Modify environment to reduce risk of injury  - Provide assistive devices as appropriate  - Consider OT/PT consult to assist with strengthening/mobility  - Encourage toileting schedule  Outcome: Progressing     Problem: Altered Communication/Language Barrier  Goal: Patient/Family is able to understand and participate in their care  Description: Interventions:  - Assess communication ability and preferred communication style  - Implement communication aides and strategies  - Use visual cues when possible  - Listen attentively, be patient, do not interrupt  - Minimize distractions  - Allow  time for understanding and response  - Establish method for patient to ask for assistance (call light)  - Provide an  as needed  - Communicate barriers and strategies to overcome with those who interact with patient  Outcome: Progressing     Problem: GASTROINTESTINAL - ADULT  Goal: Maintains or returns to baseline bowel function  Description: INTERVENTIONS:  - Assess bowel function  - Maintain adequate hydration with IV or PO as ordered and tolerated  - Evaluate effectiveness of GI medications  - Encourage mobilization and activity  - Obtain nutritional consult as needed  - Establish a toileting routine/schedule  - Consider collaborating with pharmacy to review patient's medication profile  Outcome: Progressing     Problem: HEMATOLOGIC - ADULT  Goal: Maintains hematologic stability  Description: INTERVENTIONS  - Assess for signs and symptoms of bleeding or hemorrhage  - Monitor labs and vital signs for trends  - Administer supportive blood products/factors, fluids and medications as ordered and appropriate  - Administer supportive blood products/factors as ordered and appropriate  Outcome: Progressing  Goal: Free from bleeding injury  Description: (Example usage: patient with low platelets)  INTERVENTIONS:  - Avoid intramuscular injections, enemas and rectal medication administration  - Ensure safe mobilization of patient  - Hold pressure on venipuncture sites to achieve adequate hemostasis  - Assess for signs and symptoms of internal bleeding  - Monitor lab trends  - Patient is to report abnormal signs of bleeding to staff  - Avoid use of toothpicks and dental floss  - Use electric shaver for shaving  - Use soft bristle tooth brush  - Limit straining and forceful nose blowing  Outcome: Progressing

## 2024-08-21 NOTE — PROGRESS NOTES
[facility]  part of [facility]    Progress Note    Itzel De Leon Patient Status:  Inpatient    1940 MRN [id number]   Location [facility] 5SW/SE Attending Riccardo Roblero MD   Hosp Day # 6 PCP Mehrdad Solano,        Subjective:   Itzel De Leon is a(n) 83 year old male was seen and examined  Family at bedside, translating.   Patient in good spirits and looking forward to discharging soon.   Likely discharge in a.m.  Encouraged to ambulate more.   Plan of care discussed in detail with the family at the bedside.     Objective:   Blood pressure 137/65, pulse 83, temperature 98 °F (36.7 °C), temperature source Oral, resp. rate 18, height 5' 6\" (1.676 m), weight 216 lb (98 kg), SpO2 96%.    GENERAL:  Alert and oriented to time, place, and person,  Does not appear to be in distress.    LUNGS:  Diminished breathing sounds, both lung bases. Good inspiratory effort.   HEART:  Irregular rhythm.  S1 and S2 auscultated.  Rate controlled on monitor.  ABDOMEN:  Soft, obese.  Positive bowel sounds.  No distention, no tenderness.  EXTREMITIES:  Edema +1 both legs.  No clubbing or cyanosis.    NEUROLOGIC: Motor and sensory intact.     Current Inpatient Medications:     Current Facility-Administered Medications:     rivaroxaban (Xarelto) tab 20 mg, 20 mg, Oral, Daily with food    oxyCODONE immediate release tab 2.5 mg, 2.5 mg, Oral, Q4H PRN **OR** oxyCODONE immediate release tab 5 mg, 5 mg, Oral, Q4H PRN    HYDROmorphone (Dilaudid) 1 MG/ML injection 0.2 mg, 0.2 mg, Intravenous, Q2H PRN **OR** HYDROmorphone (Dilaudid) 1 MG/ML injection 0.4 mg, 0.4 mg, Intravenous, Q2H PRN    [Held by provider] metoprolol tartrate (Lopressor) partial tab 12.5 mg, 12.5 mg, Oral, 2x Daily(Beta Blocker)    furosemide (Lasix) tab 20 mg, 20 mg, Oral, QOD    lisinopril (Prinivil; Zestril) tab 5 mg, 5 mg, Oral, Daily    propylthiouracil (PTU) tab 100 mg, 100 mg, Oral, TID    risperiDONE (RisperDAL) tab 0.5 mg, 0.5 mg,  Oral, Nightly    rosuvastatin (Crestor) tab 10 mg, 10 mg, Oral, Nightly    pantoprazole (Protonix) 40 mg in sodium chloride 0.9% PF 10 mL IV push, 40 mg, Intravenous, Daily    ondansetron (Zofran) 4 MG/2ML injection 4 mg, 4 mg, Intravenous, Q6H PRN    temazepam (Restoril) cap 15 mg, 15 mg, Oral, Nightly PRN        Results:     Recent Labs   Lab 08/18/24  0653 08/19/24  0823 08/20/24  0545 08/20/24  2231   RBC 3.81 3.92 3.85  --    HGB 7.6* 7.7* 7.6* 8.3*   HCT 25.7* 27.3* 26.3*  --    MCV 67.5* 69.6* 68.3*  --    MCH 19.9* 19.6* 19.7*  --    MCHC 29.6* 28.2* 28.9*  --    RDW 25.9* 26.0* 26.3*  --    NEPRELIM 2.85 2.88 8.23*  --    WBC 5.4 4.8 10.3  --    .0 159.0 155.0  --          Recent Labs   Lab 08/18/24  0654 08/19/24  0823 08/20/24  0545   * 102*  102* 136*   BUN 24* 20  20 22   CREATSERUM 0.95 0.89  0.89 1.03   EGFRCR 79 85  85 72   CA 9.2 9.1  9.1 8.8    143  143 139   K 3.8  3.8 4.1  4.1 4.2    109  109 106   CO2 30.0 28.0  28.0 27.0         Imaging:   No results found.          Assessment and Plan:   1.       Gastrointestinal bleed  Of note pt with hx of hemorrhoids   These were seen on colonoscopy, found to have large prolapased internal hemorrhoids  Not actively bleeding  Surgery consulted  - Now PROCTOSCOPY AND HEMORRHOIDECTOMY POD#1  - resume anticoagulation. Okay to discharge  Monitor for bleeding and recheck H&H, transfuse as indicated    2.       Posthemorrhagic microcytic anemia  Cont to transfuse to get Hgb >7  Pt with complaints of dizziness, will transfuse 1 U PRBC to keep above 8  Daily orthostatic vitals    3.       Acute HFmrEF, mod-sev TR, mild MR  4.       Chronic atrial fibrillation.  Rec'd IV lasix, now on PO  Due to HFrEF, evident on echo  Cardiology following   - resume Xarelto per Gsx   - Hold BB  - opt eval for Watchman and possibly TR intervention  - continue lisinopril, lasix, crestor  Cont strict IsOs      5.       Hyperthyroidism  Cont PTU  TSH  elevated, Ft4 wnl    6.        HTN  Stable for now  Cont to monitor    DVT Ppx: Xarelto  Full code    MDM: High

## 2024-08-21 NOTE — PROGRESS NOTES
[facility] Cardiology Progress Note    Itzel De Leon Patient Status:  Inpatient    1940 MRN [id number]   Location [facility] 5SW/SE Attending Lance Robin MD   Hosp Day # 6 PCP Mehrdad Solano,      Subjective:  Denies cp, sob, puckett, orthopnea. Mild dizziness, overall improved   Transient episode of nocturnal bradycardia, no other tele events overnight     Objective:  /50 (BP Location: Left arm)   Pulse 73   Temp 99.5 °F (37.5 °C) (Oral)   Resp 17   Ht 167.6 cm (5' 6\")   Wt 216 lb (98 kg)   SpO2 95%   BMI 34.86 kg/m²     Telemetry: Afib , 73 BPM . Transient pauses < 3 sec       Intake/Output:    Intake/Output Summary (Last 24 hours) at 2024 0913  Last data filed at 2024 2130  Gross per 24 hour   Intake 840 ml   Output --   Net 840 ml       Last 3 Weights   24 0449 216 lb (98 kg)   24 1126 213 lb 3.2 oz (96.7 kg)   24 0609 213 lb 3.2 oz (96.7 kg)   24 0711 221 lb (100.2 kg)   24 0513 215 lb 8 oz (97.8 kg)   08/15/24 1601 219 lb 8 oz (99.6 kg)   08/15/24 1307 220 lb (99.8 kg)   24 1146 221 lb (100.2 kg)   24 1654 222 lb (100.7 kg)       Labs:  Recent Labs   Lab 24  0654 24  0823 24  0545   * 102*  102* 136*   BUN 24* 20  20 22   CREATSERUM 0.95 0.89  0.89 1.03   EGFRCR 79 85  85 72   CA 9.2 9.1  9.1 8.8    143  143 139   K 3.8  3.8 4.1  4.1 4.2    109  109 106   CO2 30.0 28.0  28.0 27.0     Recent Labs   Lab 24  0653 24  0823 24  0545 24  2231   RBC 3.81 3.92 3.85  --    HGB 7.6* 7.7* 7.6* 8.3*   HCT 25.7* 27.3* 26.3*  --    MCV 67.5* 69.6* 68.3*  --    MCH 19.9* 19.6* 19.7*  --    MCHC 29.6* 28.2* 28.9*  --    RDW 25.9* 26.0* 26.3*  --    NEPRELIM 2.85 2.88 8.23*  --    WBC 5.4 4.8 10.3  --    .0 159.0 155.0  --          Recent Labs   Lab 08/15/24  1429   TROPHS 9       Diagnostics:     24 Echo  1. Left ventricle: The cavity size was normal. Wall  thickness was mildly      increased. Systolic function was mildly reduced. The estimated ejection      fraction was 45-50%, by biplane method of disks. No diagnostic evidence      for regional wall motion abnormalities. Unable to assess LV diastolic      function due to heart rhythm.   2. Right ventricle: The cavity size was increased. Systolic function was      normal.   3. Left atrium: The atrium was markedly dilated.   4. Right atrium: The atrium was dilated.   5. Aortic valve: The annulus was calcified. The valve was trileaflet. The      leaflets were mildly thickened.   6. Mitral valve: There was mild regurgitation.   7. Tricuspid valve: There was moderate-severe regurgitation.   *       Review of Systems   Respiratory: Negative.     Cardiovascular: Negative.    Neurological:  Positive for dizziness.     Physical Exam:    General: Alert and oriented x 3. No apparent distress.   HEENT: Normocephalic, anicteric sclera, neck supple, no thyromegaly or adenopathy.  Neck: No JVD, carotids 2+, no bruits.  Cardiac: Irregularly irregular rate & rhythm. S1, S2 normal. No murmur, pericardial rub, S3, or extra cardiac sounds.  Lungs: Clear without wheezes, rales, rhonchi or dullness.  Normal excursions and effort.  Abdomen: Soft, non-tender. No organosplenomegally, mass or rebound, BS-present.  Extremities: Without clubbing or cyanosis.  No left lower extremity edema, no right lower extremity edema.  Neurologic: Alert and oriented, normal affect. No focal defects  Skin: Warm and dry.       Medications:   rivaroxaban  20 mg Oral Daily with food    [Held by provider] metoprolol tartrate  12.5 mg Oral 2x Daily(Beta Blocker)    furosemide  20 mg Oral QOD    lisinopril  5 mg Oral Daily    propylthiouracil  100 mg Oral TID    risperiDONE  0.5 mg Oral Nightly    rosuvastatin  10 mg Oral Nightly    pantoprazole  40 mg Intravenous Daily         Assessment:    Acute Anemia/GIB - now s/p Hemorrhoidectomy POD 1  Hgb 6.3 on admit; s/p   PRBCs   8/16 S/p colonoscopy large internal hemorrhoids  8/29 s/p proctoscopy & hemorrhoidectomy   On PPI. Xarelto on hold  GI & Gen Surgery following   Permanent Afib, Slow VR, Tachy/Steve  BB held due to episodes of bradycardia   Xarelto on hold as above   Acute HFmrEF, Mod-Sev TR, Mild MR  Echo w/LVEF 45-50%, no RWMA  , 217  GDMT : on lisinopril . BB on hold as above. No MRA due to soft BP  Historically on furosemide 40mg po daily at home - currently on 20mg po every other day (due to being compensated, soft bp)   Compensated on exam   Hypertension  Stable   Hyperlipidemia - LDL 68, Crestor 10mg    Plan:    Ok to resume xarelto today per Dr. Sosa (Gen Surgery)  Consider Watchman eval as OP   BB held due to episodes of bradycardia, continue to hold. Currently in controlled afib. HR 80-90s. Single transient episode of nocturnal bradycardia noted overnight, otherwise no tele events.   With mildly reduced EF and severe TR, may benefit from further OP evaluation for ischemic evaluation and possible TR intervention (clip vs Evoque)   Remains compensated on exam. Continue lisinopril, lasix (BP stable, resume home regimen), crestor   Ok to discharge from Cardiology standpoint. Will sign off, please call with any questions concerns.     CORBIN James  8/21/2024  9:13 AM  Ph [phone] ([place])  Ph [phone] ([place])

## 2024-08-21 NOTE — PLAN OF CARE
Safety precautions in place. Call light within reach. Family at bedside.     Problem: Patient Centered Care  Goal: Patient preferences are identified and integrated in the patient's plan of care  Description: Interventions:  - Provide timely, complete, and accurate information to patient/family  - Incorporate patient and family knowledge, values, beliefs, and cultural backgrounds into the planning and delivery of care  - Encourage patient/family to participate in care and decision-making at the level they choose  - Honor patient and family perspectives and choices  Outcome: Progressing      Problem: SAFETY ADULT - FALL  Goal: Free from fall injury  Description: INTERVENTIONS:  - Assess pt frequently for physical needs  - Identify cognitive and physical deficits and behaviors that affect risk of falls.  - [place] fall precautions as indicated by assessment.  - Educate pt/family on patient safety including physical limitations  - Instruct pt to call for assistance with activity based on assessment  - Modify environment to reduce risk of injury  - Provide assistive devices as appropriate  - Consider OT/PT consult to assist with strengthening/mobility  - Encourage toileting schedule  Outcome: Progressing     Problem: Altered Communication/Language Barrier  Goal: Patient/Family is able to understand and participate in their care  Description: Interventions:  - Assess communication ability and preferred communication style  - Implement communication aides and strategies  - Use visual cues when possible  - Listen attentively, be patient, do not interrupt  - Minimize distractions  - Allow time for understanding and response  - Establish method for patient to ask for assistance (call light)  - Provide an  as needed  - Communicate barriers and strategies to overcome with those who interact with patient  Outcome: Progressing     Problem: GASTROINTESTINAL - ADULT  Goal: Maintains or returns to baseline bowel  function  Description: INTERVENTIONS:  - Assess bowel function  - Maintain adequate hydration with IV or PO as ordered and tolerated  - Evaluate effectiveness of GI medications  - Encourage mobilization and activity  - Obtain nutritional consult as needed  - Establish a toileting routine/schedule  - Consider collaborating with pharmacy to review patient's medication profile  Outcome: Progressing     Problem: HEMATOLOGIC - ADULT  Goal: Maintains hematologic stability  Description: INTERVENTIONS  - Assess for signs and symptoms of bleeding or hemorrhage  - Monitor labs and vital signs for trends  - Administer supportive blood products/factors, fluids and medications as ordered and appropriate  - Administer supportive blood products/factors as ordered and appropriate  Outcome: Progressing  Goal: Free from bleeding injury  Description: (Example usage: patient with low platelets)  INTERVENTIONS:  - Avoid intramuscular injections, enemas and rectal medication administration  - Ensure safe mobilization of patient  - Hold pressure on venipuncture sites to achieve adequate hemostasis  - Assess for signs and symptoms of internal bleeding  - Monitor lab trends  - Patient is to report abnormal signs of bleeding to staff  - Avoid use of toothpicks and dental floss  - Use electric shaver for shaving  - Use soft bristle tooth brush  - Limit straining and forceful nose blowing  Outcome: Progressing

## 2024-08-22 VITALS
WEIGHT: 216.63 LBS | HEIGHT: 66 IN | TEMPERATURE: 98 F | OXYGEN SATURATION: 96 % | HEART RATE: 75 BPM | RESPIRATION RATE: 18 BRPM | BODY MASS INDEX: 34.81 KG/M2 | SYSTOLIC BLOOD PRESSURE: 138 MMHG | DIASTOLIC BLOOD PRESSURE: 67 MMHG

## 2024-08-22 LAB
ALBUMIN SERPL-MCNC: 4.3 G/DL (ref 3.2–4.8)
ALBUMIN/GLOB SERPL: 1.5 {RATIO} (ref 1–2)
ALP LIVER SERPL-CCNC: 60 U/L
ALT SERPL-CCNC: <7 U/L
ANION GAP SERPL CALC-SCNC: 7 MMOL/L (ref 0–18)
AST SERPL-CCNC: 18 U/L (ref ?–34)
BASOPHILS # BLD AUTO: 0.04 X10(3) UL (ref 0–0.2)
BASOPHILS NFR BLD AUTO: 0.4 %
BILIRUB SERPL-MCNC: 1.2 MG/DL (ref 0.2–1.1)
BUN BLD-MCNC: 18 MG/DL (ref 9–23)
BUN/CREAT SERPL: 20.7 (ref 10–20)
CALCIUM BLD-MCNC: 9 MG/DL (ref 8.7–10.4)
CHLORIDE SERPL-SCNC: 106 MMOL/L (ref 98–112)
CO2 SERPL-SCNC: 27 MMOL/L (ref 21–32)
CREAT BLD-MCNC: 0.87 MG/DL
DEPRECATED RDW RBC AUTO: 67.8 FL (ref 35.1–46.3)
EGFRCR SERPLBLD CKD-EPI 2021: 85 ML/MIN/1.73M2 (ref 60–?)
EOSINOPHIL # BLD AUTO: 0.09 X10(3) UL (ref 0–0.7)
EOSINOPHIL NFR BLD AUTO: 1 %
ERYTHROCYTE [DISTWIDTH] IN BLOOD BY AUTOMATED COUNT: 27.8 % (ref 11–15)
GLOBULIN PLAS-MCNC: 2.8 G/DL (ref 2–3.5)
GLUCOSE BLD-MCNC: 112 MG/DL (ref 70–99)
HCT VFR BLD AUTO: 30 %
HGB BLD-MCNC: 8.8 G/DL
IMM GRANULOCYTES # BLD AUTO: 0.03 X10(3) UL (ref 0–1)
IMM GRANULOCYTES NFR BLD: 0.3 %
LYMPHOCYTES # BLD AUTO: 2.17 X10(3) UL (ref 1–4)
LYMPHOCYTES NFR BLD AUTO: 23.2 %
MCH RBC QN AUTO: 20.5 PG (ref 26–34)
MCHC RBC AUTO-ENTMCNC: 29.3 G/DL (ref 31–37)
MCV RBC AUTO: 69.9 FL
MONOCYTES # BLD AUTO: 0.91 X10(3) UL (ref 0.1–1)
MONOCYTES NFR BLD AUTO: 9.7 %
NEUTROPHILS # BLD AUTO: 6.1 X10 (3) UL (ref 1.5–7.7)
NEUTROPHILS # BLD AUTO: 6.1 X10(3) UL (ref 1.5–7.7)
NEUTROPHILS NFR BLD AUTO: 65.4 %
OSMOLALITY SERPL CALC.SUM OF ELEC: 293 MOSM/KG (ref 275–295)
PLATELET # BLD AUTO: 163 10(3)UL (ref 150–450)
PLATELETS.RETICULATED NFR BLD AUTO: 5.6 % (ref 0–7)
POTASSIUM SERPL-SCNC: 4.4 MMOL/L (ref 3.5–5.1)
PROT SERPL-MCNC: 7.1 G/DL (ref 5.7–8.2)
RBC # BLD AUTO: 4.29 X10(6)UL
SODIUM SERPL-SCNC: 140 MMOL/L (ref 136–145)
WBC # BLD AUTO: 9.3 X10(3) UL (ref 4–11)

## 2024-08-22 PROCEDURE — 99239 HOSP IP/OBS DSCHRG MGMT >30: CPT | Performed by: INTERNAL MEDICINE

## 2024-08-22 NOTE — PLAN OF CARE
Problem: Patient Centered Care  Goal: Patient preferences are identified and integrated in the patient's plan of care  Description: Interventions:  - What would you like us to know as we care for you? From home with daughter  - Provide timely, complete, and accurate information to patient/family  - Incorporate patient and family knowledge, values, beliefs, and cultural backgrounds into the planning and delivery of care  - Encourage patient/family to participate in care and decision-making at the level they choose  - Honor patient and family perspectives and choices  Outcome: Progressing     Problem: Patient/Family Goals  Goal: Patient/Family Long Term Goal  Description: Patient's Long Term Goal: To go home    Interventions:  - Monitor vital signs and labs  - Pain management as needed  - Diagnostics per order  - Follow MD orders  - Administer medications as ordered  - Assists with activities of daily living   - Update patient and family on plan of care  - Discharge planning  - See additional Care Plan goals for specific interventions  Outcome: Progressing  Goal: Patient/Family Short Term Goal  Description: Patient's Short Term Goal: To get better    Interventions:   - Monitor vital signs and labs  - Pain management as needed  - Diagnostics per order  - Follow MD orders  - Administer medications as ordered  - Assists with activities of daily living   - Update patient and family on plan of care  - Discharge planning  - See additional Care Plan goals for specific interventions  Outcome: Progressing     Problem: SAFETY ADULT - FALL  Goal: Free from fall injury  Description: INTERVENTIONS:  - Assess pt frequently for physical needs  - Identify cognitive and physical deficits and behaviors that affect risk of falls.  - [place] fall precautions as indicated by assessment.  - Educate pt/family on patient safety including physical limitations  - Instruct pt to call for assistance with activity based on assessment  -  Modify environment to reduce risk of injury  - Provide assistive devices as appropriate  - Consider OT/PT consult to assist with strengthening/mobility  - Encourage toileting schedule  Outcome: Progressing     Problem: Altered Communication/Language Barrier  Goal: Patient/Family is able to understand and participate in their care  Description: Interventions:  - Assess communication ability and preferred communication style  - Implement communication aides and strategies  - Use visual cues when possible  - Listen attentively, be patient, do not interrupt  - Minimize distractions  - Allow time for understanding and response  - Establish method for patient to ask for assistance (call light)  - Provide an  as needed  - Communicate barriers and strategies to overcome with those who interact with patient  Outcome: Progressing     Problem: GASTROINTESTINAL - ADULT  Goal: Maintains or returns to baseline bowel function  Description: INTERVENTIONS:  - Assess bowel function  - Maintain adequate hydration with IV or PO as ordered and tolerated  - Evaluate effectiveness of GI medications  - Encourage mobilization and activity  - Obtain nutritional consult as needed  - Establish a toileting routine/schedule  - Consider collaborating with pharmacy to review patient's medication profile  Outcome: Progressing     Problem: HEMATOLOGIC - ADULT  Goal: Maintains hematologic stability  Description: INTERVENTIONS  - Assess for signs and symptoms of bleeding or hemorrhage  - Monitor labs and vital signs for trends  - Administer supportive blood products/factors, fluids and medications as ordered and appropriate  - Administer supportive blood products/factors as ordered and appropriate  Outcome: Progressing  Goal: Free from bleeding injury  Description: (Example usage: patient with low platelets)  INTERVENTIONS:  - Avoid intramuscular injections, enemas and rectal medication administration  - Ensure safe mobilization of  patient  - Hold pressure on venipuncture sites to achieve adequate hemostasis  - Assess for signs and symptoms of internal bleeding  - Monitor lab trends  - Patient is to report abnormal signs of bleeding to staff  - Avoid use of toothpicks and dental floss  - Use electric shaver for shaving  - Use soft bristle tooth brush  - Limit straining and forceful nose blowing  Outcome: Progressing

## 2024-08-22 NOTE — PROGRESS NOTES
[facility]  part of [facility]    Progress Note    Itzel De Leon Patient Status:  Inpatient    1940 MRN [id number]   Location [facility] 5SW/SE Attending Lance Robin MD   Hosp Day # 7 PCP Mehrdad Solano,      Subjective:  Doing well     Objective/Physical Exam:  General: Alert, orientated x3.  Cooperative.  No apparent distress.  Vital Signs:  Blood pressure 138/67, pulse 75, temperature 98.3 °F (36.8 °C), temperature source Oral, resp. rate 18, height 5' 6\" (1.676 m), weight 216 lb 9.6 oz (98.2 kg), SpO2 96%.    Labs:  Lab Results   Component Value Date    WBC 9.3 2024    HGB 8.8 (L) 2024    HCT 30.0 (L) 2024    .0 2024    CREATSERUM 0.87 2024    BUN 18 2024     2024    K 4.4 2024     2024    CO2 27.0 2024     (H) 2024    CA 9.0 2024    ALB 4.3 2024    ALKPHO 60 2024    BILT 1.2 (H) 2024    TP 7.1 2024    AST 18 2024    ALT <7 (L) 2024    T4F 0.9 2024    TSH 6.078 (H) 2024    MG 1.9 2024    PHOS 4.1 2024       Imaging:  CARD ECHO 2D DOPPLER CONTRAST (CPT=93306)    Result Date: 2024  Transthoracic Echocardiogram Name:Itzel De Leon Date: 2024 :  1940 Ht:  (66in)  BP: 113 / 62 MRN:  [id number]    Age:  83years    Wt:  (216lb) HR: Loc:  [facility]       Gndr: M          BSA: 2.06m^2 Sonographer: Phyllis AVILES Ordering:    Aimee Carl Consulting:  Joe Borges ---------------------------------------------------------------------------- History/Indications:   Atrial fibrillation.  Heart failure. GI bleed. ---------------------------------------------------------------------------- Procedure information:  A transthoracic complete 2D study was performed. Additional evaluation included M-mode, complete spectral Doppler, and color Doppler.  Patient status:  Inpatient.  Location:  Bedside.    Comparison was  made to the study of 10/20/2016.    This was a routine study. Transthoracic echocardiography for diagnosis and ventricular function evaluation. Image quality was suboptimal. The study was technically limited due to poor acoustic window availability. Intravenous contrast (Definity) was administered to evaluate left ventricular function and opacify the LV. ECG rhythm:   Atrial fibrillation ---------------------------------------------------------------------------- Conclusions: 1. Left ventricle: The cavity size was normal. Wall thickness was mildly    increased. Systolic function was mildly reduced. The estimated ejection    fraction was 45-50%, by biplane method of disks. No diagnostic evidence    for regional wall motion abnormalities. Unable to assess LV diastolic    function due to heart rhythm. 2. Right ventricle: The cavity size was increased. Systolic function was    normal. 3. Left atrium: The atrium was markedly dilated. 4. Right atrium: The atrium was dilated. 5. Aortic valve: The annulus was calcified. The valve was trileaflet. The    leaflets were mildly thickened. 6. Mitral valve: There was mild regurgitation. 7. Tricuspid valve: There was moderate-severe regurgitation. * ---------------------------------------------------------------------------- * Findings: Left ventricle:  The cavity size was normal. Wall thickness was mildly increased. Systolic function was mildly reduced. The estimated ejection fraction was 45-50%, by biplane method of disks. No diagnostic evidence for diffuse regional wall motion abnormalities. No diagnostic evidence for regional wall motion abnormalities. Unable to assess LV diastolic function due to heart rhythm. Ventricular septum:   Thickness was normal. Left atrium:  The atrium was markedly dilated. Right ventricle:  The cavity size was increased. Systolic function was normal. Right atrium:  The atrium was dilated. Mitral valve:  The valve was structurally normal. The leaflets  were normal thickness. Leaflet separation was normal.  Doppler:  Transvalvular velocity was within the normal range. There was no evidence for stenosis. There was mild regurgitation. Aortic valve:  The annulus was calcified. The valve was trileaflet. The leaflets were mildly thickened. Cusp separation was normal.  Doppler: Transvalvular velocity was within the normal range. There was no evidence for stenosis. There was no significant regurgitation. Tricuspid valve:  The valve is structurally normal. Leaflet separation was normal.  Doppler:  Transvalvular velocity was within the normal range. There was no evidence for stenosis. There was moderate-severe regurgitation. Pulmonic valve:   The valve is structurally normal. Cusp separation was normal.  Doppler:  Transvalvular velocity was within the normal range. There was no evidence for stenosis. There was no significant regurgitation. Pericardium:   There was no pericardial effusion. Aorta: Aortic root: The aortic root was normal-sized. Ascending aorta: The ascending aorta was normal. Pulmonary arteries: The main pulmonary artery was normal-sized.  Systolic pressure could not be accurately estimated. Systemic veins: Inferior vena cava: The IVC was normally collapsible and normal-sized. ---------------------------------------------------------------------------- Measurements  Left ventricle                    Value        Ref  IVS thickness, ED, PLAX           0.9   cm     0.6 - 1.0  LV ID, ED, PLAX                   4.8   cm     4.2 - 5.8  LV ID, ES, PLAX                   3.4   cm     2.5 - 4.0  LV PW thickness, ED, PLAX     (H) 1.1   cm     0.6 - 1.0  IVS/LV PW ratio, ED, PLAX         0.82         ---------  LV PW/LV ID ratio, ED, PLAX       0.23         ---------  LV ejection fraction              56    %      52 - 72  Stroke volume/bsa, 2D             35    ml/m^2 ---------  LV end-diastolic volume, 1-p      80    ml     69 - 185  A4C  LV ejection fraction, 1-p A4C      53    %      46 - 74  Stroke volume, 1-p A4C            43    ml     ---------  LV end-diastolic volume/bsa,      39    ml/m^2 37 - 93  1-p A4C  Stroke volume/bsa, 1-p A4C        21    ml/m^2 ---------  LV end-diastolic volume, 2-p      87    ml     62 - 150  LV end-systolic volume, 2-p       45    ml     21 - 61  LV ejection fraction, 2-p     (L) 48    %      52 - 72  Stroke volume, 2-p                42    ml     ---------  LV end-diastolic volume/bsa,      42    ml/m^2 34 - 74  2-p  LV end-systolic volume/bsa,       22    ml/m^2 11 - 31  2-p  Stroke volume/bsa, 2-p            20.4  ml/m^2 ---------  LV e', lateral                    12.5  cm/sec >=10.0  LV E/e', lateral                  10           <=13  LV e', medial                     9.1   cm/sec >=7.0  LV E/e', medial                   14           ---------  LV e', average                    10.8  cm/sec ---------  LV E/e', average                  12           <=14  LVOT                              Value        Ref  LVOT ID                           2.3   cm     ---------  LVOT peak velocity, S             0.78  m/sec  ---------  LVOT VTI, S                       17.6  cm     ---------  LVOT peak gradient, S             2     mm Hg  ---------  LVOT mean gradient, S             1     mm Hg  ---------  Stroke volume (SV), LVOT DP       73    ml     ---------  Stroke index (SV/bsa), LVOT       35    ml/m^2 ---------  DP  Aortic root                       Value        Ref  Aortic root ID                    3.1   cm     2.7 - 4.2  Aortic root ID, STJ, ED           2.4   cm     2.3 - 3.5  Aortic root ID, ED, MM            3.9   cm     ---------  Ascending aorta                   Value        Ref  Ascending aorta ID                3.2   cm     2.2 - 3.8  Left atrium                       Value        Ref  LA ID, A-P, ES                (H) 4.8   cm     3.0 - 4.0  LA volume, S                  (H) 127   ml     18 - 58  LA volume/bsa, S              (H) 62     ml/m^2 16 - 34  LA volume, ES, 1-p A4C        (H) 102   ml     18 - 58  LA volume, ES, 1-p A2C        (H) 158   ml     18 - 58  LA volume, ES, A/L                137   ml     ---------  LA volume/bsa, ES, A/L        (H) 66    ml/m^2 16 - 34  LA/aortic root ratio              1.55         ---------  Mitral valve                      Value        Ref  Mitral E-wave peak velocity       1.25  m/sec  ---------  Mitral deceleration time          148   ms     ---------  Mitral peak gradient, D           6     mm Hg  ---------  Mitral regurg peak velocity       363   cm/sec ---------  Mitral peak LV-LA gradient, S     52.71 mm Hg  ---------  Mitral maximal regurg             268   cm/sec ---------  velocity, PISA  Tricuspid valve                   Value        Ref  Tricuspid regurg peak         (H) 2.98  m/sec  <=2.8  velocity  Tricuspid peak RV-RA gradient     36    mm Hg  ---------  Right atrium                      Value        Ref  RA ID, S-I, ES, A4C           (H) 6.8   cm     3.4 - 5.3  RA ID/bsa, S-I, ES, A4C       (H) 3.3   cm/m^2 1.8 - 3.0  RA area, ES, A4C              (H) 28    cm^2   10 - 18  RA volume, ES, 1-p A4C            94    ml     ---------  RA volume/bsa, ES, 1-p A4C    (H) 45    ml/m^2 11 - 39  Inferior vena cava                Value        Ref  ID                                2.0   cm     <=2.1  Right ventricle                   Value        Ref  TAPSE, MM                     (L) 1.68  cm     >=1.70  RV s', lateral                    10.3  cm/sec >=9.5 Legend: (L)  and  (H)  kusum values outside specified reference range. ---------------------------------------------------------------------------- Prepared and electronically signed by Fernando Hodge 08/16/2024 10:49     XR CHEST AP PORTABLE  (CPT=71045)    Result Date: 8/15/2024  PROCEDURE: XR CHEST AP PORTABLE  (CPT=71045) TIME: 1349 hours  COMPARISON: [facility], X CHEST PA LAT ROUTINE, 9/04/2016, 11:44 AM.  INDICATIONS: Shortness  of breath today.  TECHNIQUE:   Single view.   FINDINGS:  CARDIAC/VASC: Cardiomegaly.  Prominent central vasculature  MEDIAST/HARDIK:   Atherosclerotic aorta with no visible aneurysm.  LUNGS/PLEURA: No acute pulmonary disease.  Prominent basilar pulmonary markings BONES: Mild scoliosis with mild degenerative disc disease and spondylosis.  OTHER: Negative.          CONCLUSION:  1. Cardiomegaly and prominent central vasculature. 2. Prominent basilar pulmonary markings.  No acute pulmonary disease.    Dictated by (CST): Rob Isaac MD on 8/15/2024 at 2:13 PM     Finalized by (CST): Rob Isaac MD on 8/15/2024 at 2:14 PM            Assessment/Plan:  Patient Active Problem List   Diagnosis    Hyperthyroidism    Atrial fibrillation (HCC)    Age-related nuclear cataract of left eye    Edema    Pure hypercholesterolemia    Alzheimer's disease, unspecified (HCC)    Thrombocytopenia (HCC)    Gastrointestinal hemorrhage, unspecified gastrointestinal hemorrhage type    Severe anemia    GI bleed     8.8 hgb   xarelto started    Instructions given   to see one week  RASHIDA RAMIRES MD  8/22/2024  9:21 AM

## 2024-08-22 NOTE — DISCHARGE SUMMARY
[facility]  part of [facility]    DISCHARGE SUMMARY     Itzel De Leon Patient Status:  Inpatient    1940 MRN [id number]   Location [facility] 5SW/SE Attending Lance Robin MD   Hosp Day # 7 PCP Mehrdad Solano DO     Date of Admission: 8/15/2024  Date of Discharge:  2024    Discharge Disposition: Home or Self Care    Discharge Diagnosis:     1.       Gastrointestinal bleed  2.       Posthemorrhagic microcytic anemia  3.       Acute HFmrEF, mod-sev TR, mild MR  4.       Chronic atrial fibrillation.  5.       Hyperthyroidism  6.        HTN      History of Present Illness:     The patient is an 83-year-old  male who was brought in today for evaluation by his family for progressive shortness of breath. CBC showed hemoglobin of 6.3 which is below his baseline, MCV 63.9. B-natriuretic peptide 175. Troponin was negative. Chemistry showed BUN and creatinine ratio of 20.4. Chest x-ray showed cardiomegaly with prominent central vasculature. The patient was started on IV Protonix and Lasix, and he will be admitted to the hospital for further management.     Brief Synopsis:     1.       Gastrointestinal bleed  Of note pt with hx of hemorrhoids   These were seen on colonoscopy, found to have large prolapased internal hemorrhoids  Not actively bleeding  Surgery consulted  - Now s/p PROCTOSCOPY AND HEMORRHOIDECTOMY   - resume anticoagulation. Okay to discharge  Recheck CBC as opt     2.       Posthemorrhagic microcytic anemia  Cont to transfuse to get Hgb >7  Transfused 1 U PRBC to keep above 8     3.       Acute HFmrEF, mod-sev TR, mild MR  4.       Chronic atrial fibrillation.  Rec'd IV lasix, now on PO  Due to HFrEF, evident on echo  Cardiology following                - resume Xarelto per Gsx   - Hold BB  - opt eval for Watchman and possibly TR intervention  - continue rest of home meds   Cont strict IsOs     5.       Hyperthyroidism  Cont PTU  TSH elevated, Ft4 wnl     6.         HTN  Stable for now  Cont to monitor    Patient is to follow up with PCP, Cardiology and Gsx as opt. Repeat CBC as opt to monitor H&H.  Patient is to remain compliant with all discharge medications and instructions and to follow up as advised.   Patient encouraged to make healthy lifestyle and dietary changes.    Lace+ Score: 70  59-90 High Risk  29-58 Medium Risk  0-28   Low Risk       TCM Follow-Up Recommendation:  LACE > 58: High Risk of readmission after discharge from the hospital.      Procedures during hospitalization:   Prostatectomy and hemorrhoidectomy    Incidental or significant findings and recommendations (brief descriptions):  None    Lab/Test results pending at Discharge:   Shaina    Consultants:  Gsx  Cardiology    Discharge Medication List:     Discharge Medications        CONTINUE taking these medications        Instructions Prescription details   enalapril 20 MG Tabs  Commonly known as: Vasotec      Take 1 tablet (20 mg total) by mouth daily.   Quantity: 90 tablet  Refills: 1     furosemide 20 MG Tabs  Commonly known as: Lasix      Take 2 tablets (40 mg total) by mouth daily.   Quantity: 180 tablet  Refills: 3     hydrocortisone 2.5 % Crea  Commonly known as: Procto-Med HC      Place 1 Application rectally daily as needed for Hemorrhoids.   Quantity: 45 g  Refills: 1     propylthiouracil 50 MG Tabs  Commonly known as: PTU      Take 2 tablets (100 mg total) by mouth 3 (three) times daily.   Quantity: 540 tablet  Refills: 3     risperiDONE 0.5 MG Tabs  Commonly known as: RisperDAL      Take 1 tablet (0.5 mg total) by mouth nightly.   Refills: 0     rivaroxaban 20 MG Tabs  Commonly known as: Xarelto      Take 1 tablet (20 mg total) by mouth daily with food.   Quantity: 30 tablet  Refills: 0     rosuvastatin 10 MG Tabs  Commonly known as: Crestor      rosuvastatin 10 mg tablet, [RxNorm: 474753]   Refills: 0            STOP taking these medications      atenolol 100 MG Tabs  Commonly known as:  DESMOND                 [facility] reviewed: yes    Follow-up appointment:   Jeo Borges MD  [address] [facility]  [address]  [place] [address]  [phone]    Follow up in 2 week(s)  Office will call to schedule    Mehrdad Solano DO  [address]  [place] [address]  [phone]    Follow up in 1 week(s)      Kalyan Sosa MD  [address] [facility] [address]  [place] [address]  [phone]    Follow up in 1 week(s)      Appointments for Next 30 Days 8/22/2024 - 9/21/2024      None            Vital signs:  Temp:  [98.3 °F (36.8 °C)-98.5 °F (36.9 °C)] 98.3 °F (36.8 °C)  Pulse:  [60-89] 75  Resp:  [16-18] 18  BP: (109-138)/(47-67) 138/67  SpO2:  [95 %-96 %] 96 %    Physical Exam:    Gen: NAD AO x3  Chest: good air entry CTABL  CVS: normal s1 and s2 RR  Abd: NABS soft NT ND  Neuro: CN 2-12 grossly intact  Ext: no edema in bilateral LE    -----------------------------------------------------------------------------------------------  PATIENT DISCHARGE INSTRUCTIONS: See electronic chart    Lance Robin MD  Hospitalist    Time spent:  > 30 minutes    The 21st Century Cures Act makes medical notes like these available to patients in the interest of transparency. Please be advised this is a medical document. Medical documents are intended to carry relevant information, facts as evident, and the clinical opinion of the practitioner. The medical note is intended as peer to peer communication and may appear blunt or direct. It is written in medical language and may contain abbreviations or verbiage that are unfamiliar.

## 2024-08-23 ENCOUNTER — PATIENT OUTREACH (OUTPATIENT)
Dept: CASE MANAGEMENT | Age: 84
End: 2024-08-23

## 2024-08-23 NOTE — PROGRESS NOTES
NCM attempted to reach the patient to complete a TCM/HFU call. Left message to call back. [facility] provided direct contact info at [phone].

## 2024-08-24 ENCOUNTER — TELEPHONE (OUTPATIENT)
Dept: FAMILY MEDICINE CLINIC | Facility: CLINIC | Age: 84
End: 2024-08-24

## 2024-08-24 NOTE — TELEPHONE ENCOUNTER
Patient's relative Tasi on [facility] wanted to schedule a hospital follow up for patient. Next available is 9/6 and tasi declined and would like for patient to be seen sometime next week. Please advise

## 2024-08-24 NOTE — TELEPHONE ENCOUNTER
Ok to offer res24 for hospital follow up w/ Dr Solano otherwise will need to see other provider. Thanks

## 2024-08-26 RX ORDER — HYDROCORTISONE 25 MG/G
1 CREAM TOPICAL
Qty: 56 G | Refills: 1 | Status: SHIPPED | OUTPATIENT
Start: 2024-08-26

## 2024-08-26 NOTE — TELEPHONE ENCOUNTER
Refill passes per [facility] protocol.    Future Appointments   Date Time Provider Department Center   8/29/2024  2:30 PM Mehrdad Solano DO [facility]     LAST OFFICE VISIT: 5/9/2024    Requested Prescriptions   Pending Prescriptions Disp Refills    PROCTO-MED HC 2.5 % External Cream [Pharmacy Med Name: Procto-Med Hc 2.5 % Cre Lead] 56 g 0     Sig: PLACE ONE APPLICATION RECTALLY DAILY AS NEEDED HERMORRHOIDS       Gastrointestional Medication Protocol Passed - 8/25/2024  9:29 AM        Passed - In person appointment or virtual visit in the past 12 mos or appointment in next 3 mos     Recent Outpatient Visits              3 months ago Localized edema    [facility] [place]     Office Visit    7 months ago [facility] osteoarthritis [facility] right [facility]    [facility] Visit    8 months ago Alzheimer's disease, unspecified (HCC)    [facility] [place]     Office Visit    1 year ago Effusion, right knee    [facility] [place] [place],     Office Visit    1 year ago Right knee pain, unspecified chronicity    [facility] [place],     Office Visit          Future Appointments         Provider Department Appt Notes    In 3 days [facility]    In 4 weeks [facility] Dr Robles 8/19/2024                         Future Appointments         Provider Department Appt Notes    In 3 days [facility]    In 4 weeks [facility] Dr Robles 8/19/2024          Recent Outpatient Visits              3 months ago Localized edema    [facility], [address], Mehrdad Diego,     Office Visit    7 months ago Primary osteoarthritis of right knee    [facility], Jose Angel Engle MD    Office Visit    8 months ago Alzheimer's disease, unspecified (HCC)    [facility], [address], Mehrdad Diego,     Office Visit    1 year ago Effusion, right knee    [facility], [address], Mehrdad Diego DO    Office Visit    1 year ago Right knee pain, unspecified chronicity    [facility], Mehrdad Diego,     Office Visit

## 2024-08-29 ENCOUNTER — OFFICE VISIT (OUTPATIENT)
Dept: FAMILY MEDICINE CLINIC | Facility: CLINIC | Age: 84
End: 2024-08-29

## 2024-08-29 VITALS
OXYGEN SATURATION: 97 % | HEART RATE: 90 BPM | WEIGHT: 214 LBS | HEIGHT: 66 IN | BODY MASS INDEX: 34.39 KG/M2 | DIASTOLIC BLOOD PRESSURE: 66 MMHG | SYSTOLIC BLOOD PRESSURE: 122 MMHG

## 2024-08-29 DIAGNOSIS — D50.0 IRON DEFICIENCY ANEMIA DUE TO CHRONIC BLOOD LOSS: Primary | ICD-10-CM

## 2024-08-29 DIAGNOSIS — K64.9 HEMORRHOIDS, UNSPECIFIED HEMORRHOID TYPE: ICD-10-CM

## 2024-08-29 PROCEDURE — 99214 OFFICE O/P EST MOD 30 MIN: CPT | Performed by: FAMILY MEDICINE

## 2024-08-29 RX ORDER — FERROUS SULFATE 325(65) MG
325 TABLET ORAL
Qty: 30 TABLET | Refills: 0 | Status: SHIPPED | OUTPATIENT
Start: 2024-08-29

## 2024-08-29 NOTE — PROGRESS NOTES
Subjective:   Itzel De Leon is a 84 year old male who presents for Hospital F/U (Hemoglobin was low and patient was dizzy and short of breath - at the hospital for 8 days -patient got hemorrhoids surgery )       History/Other:    Chief Complaint Reviewed and Verified  Nursing Notes Reviewed and   Verified  Tobacco Reviewed  Allergies Reviewed  Medications Reviewed    Medical History Reviewed  Surgical History Reviewed  Family History   Reviewed  Social History Reviewed         Tobacco:  He has never smoked tobacco.    Current Outpatient Medications   Medication Sig Dispense Refill    Ferrous Sulfate 325 (65 Fe) MG Oral Tab Take 1 tablet (325 mg total) by mouth daily with breakfast. 30 tablet 0    hydrocortisone (PROCTO-MED HC) 2.5 % External Cream Place 1 Application rectally daily as needed for Hemorrhoids. 56 g 1    propylthiouracil 50 MG Oral Tab Take 2 tablets (100 mg total) by mouth 3 (three) times daily. 540 tablet 3    rosuvastatin 10 MG Oral Tab rosuvastatin 10 mg tablet, [RxNorm: 919544]      furosemide 20 MG Oral Tab Take 2 tablets (40 mg total) by mouth daily. 180 tablet 3    Rivaroxaban (XARELTO) 20 MG Oral Tab Take 1 tablet (20 mg total) by mouth daily with food. 30 tablet 0    risperiDONE 0.5 MG Oral Tab Take 1 tablet (0.5 mg total) by mouth nightly.      enalapril 20 MG Oral Tab Take 1 tablet (20 mg total) by mouth daily. (Patient not taking: Reported on 8/29/2024) 90 tablet 1         Review of Systems:  Review of Systems   Constitutional: Negative.    Respiratory: Negative.     Cardiovascular: Negative.    Neurological:  Negative for dizziness.         Objective:   /66   Pulse 90   Ht 5' 6\" (1.676 m)   Wt 214 lb (97.1 kg)   SpO2 97%   BMI 34.54 kg/m²  Estimated body mass index is 34.54 kg/m² as calculated from the following:    Height as of this encounter: 5' 6\" (1.676 m).    Weight as of this encounter: 214 lb (97.1 kg).  Physical Exam  Vitals reviewed.   Cardiovascular:       Rate and Rhythm: Normal rate and regular rhythm.      Heart sounds: Normal heart sounds.   Pulmonary:      Effort: Pulmonary effort is normal.      Breath sounds: Normal breath sounds.   Musculoskeletal:      Right lower leg: No edema.      Left lower leg: No edema.           Assessment & Plan:   1. Iron deficiency anemia due to chronic blood loss (Primary)  -     Cancel: CBC With Differential With Platelet; Future; Expected date: 09/03/2024  -     Iron And Tibc; Future; Expected date: 09/03/2024  -     CBC With Differential With Platelet; Future; Expected date: 09/03/2024  2. Hemorrhoids, unspecified hemorrhoid type  Other orders  -     Ferrous Sulfate; Take 1 tablet (325 mg total) by mouth daily with breakfast.  Dispense: 30 tablet; Refill: 0    I placed aWe will recheck iron and hemoglobin levels next week.  Ferrous sulfate 325 once daily.  He was seen for follow-up with surgeon yesterday and son states that they are satisfied with healing there is been no brisk bleeding at this time    No follow-ups on file.    Mehrdad Solano DO, 8/29/2024, 3:07 PM

## 2024-08-30 NOTE — PROGRESS NOTES
Patient went in for hospital follow-up appointment with primary care provider on 8/29/24.  Encounter closing.

## 2024-09-03 ENCOUNTER — LAB ENCOUNTER (OUTPATIENT)
Dept: LAB | Age: 84
End: 2024-09-03
Attending: FAMILY MEDICINE
Payer: MEDICARE

## 2024-09-03 DIAGNOSIS — K92.2 GASTROINTESTINAL HEMORRHAGE, UNSPECIFIED GASTROINTESTINAL HEMORRHAGE TYPE: ICD-10-CM

## 2024-09-03 DIAGNOSIS — D50.0 IRON DEFICIENCY ANEMIA DUE TO CHRONIC BLOOD LOSS: ICD-10-CM

## 2024-09-03 LAB
BASOPHILS # BLD AUTO: 0.04 X10(3) UL (ref 0–0.2)
BASOPHILS NFR BLD AUTO: 0.6 %
DEPRECATED RDW RBC AUTO: 68.5 FL (ref 35.1–46.3)
EOSINOPHIL # BLD AUTO: 0.12 X10(3) UL (ref 0–0.7)
EOSINOPHIL NFR BLD AUTO: 1.8 %
ERYTHROCYTE [DISTWIDTH] IN BLOOD BY AUTOMATED COUNT: 26.8 % (ref 11–15)
HCT VFR BLD AUTO: 33 %
HGB BLD-MCNC: 9.4 G/DL
IMM GRANULOCYTES # BLD AUTO: 0.02 X10(3) UL (ref 0–1)
IMM GRANULOCYTES NFR BLD: 0.3 %
IRON SATN MFR SERPL: 5 %
IRON SERPL-MCNC: 12 UG/DL
LYMPHOCYTES # BLD AUTO: 1.81 X10(3) UL (ref 1–4)
LYMPHOCYTES NFR BLD AUTO: 26.9 %
MCH RBC QN AUTO: 20.6 PG (ref 26–34)
MCHC RBC AUTO-ENTMCNC: 28.5 G/DL (ref 31–37)
MCV RBC AUTO: 72.2 FL
MONOCYTES # BLD AUTO: 0.69 X10(3) UL (ref 0.1–1)
MONOCYTES NFR BLD AUTO: 10.2 %
NEUTROPHILS # BLD AUTO: 4.06 X10 (3) UL (ref 1.5–7.7)
NEUTROPHILS # BLD AUTO: 4.06 X10(3) UL (ref 1.5–7.7)
NEUTROPHILS NFR BLD AUTO: 60.2 %
PLATELET # BLD AUTO: 296 10(3)UL (ref 150–450)
PLATELET MORPHOLOGY: NORMAL
PLATELETS.RETICULATED NFR BLD AUTO: 4.7 % (ref 0–7)
RBC # BLD AUTO: 4.57 X10(6)UL
TIBC SERPL-MCNC: 250 UG/DL (ref 250–425)
TRANSFERRIN SERPL-MCNC: 168 MG/DL (ref 215–365)
WBC # BLD AUTO: 6.7 X10(3) UL (ref 4–11)

## 2024-09-03 PROCEDURE — 84466 ASSAY OF TRANSFERRIN: CPT

## 2024-09-03 PROCEDURE — 36415 COLL VENOUS BLD VENIPUNCTURE: CPT

## 2024-09-03 PROCEDURE — 83540 ASSAY OF IRON: CPT

## 2024-09-03 PROCEDURE — 85025 COMPLETE CBC W/AUTO DIFF WBC: CPT

## 2024-09-24 ENCOUNTER — OFFICE VISIT (OUTPATIENT)
Facility: CLINIC | Age: 84
End: 2024-09-24

## 2024-09-24 VITALS
HEART RATE: 86 BPM | WEIGHT: 210 LBS | SYSTOLIC BLOOD PRESSURE: 144 MMHG | HEIGHT: 66 IN | BODY MASS INDEX: 33.75 KG/M2 | DIASTOLIC BLOOD PRESSURE: 70 MMHG

## 2024-09-24 DIAGNOSIS — K64.9 HEMORRHOIDS, UNSPECIFIED HEMORRHOID TYPE: Primary | ICD-10-CM

## 2024-09-24 PROCEDURE — 99214 OFFICE O/P EST MOD 30 MIN: CPT | Performed by: STUDENT IN AN ORGANIZED HEALTH CARE EDUCATION/TRAINING PROGRAM

## 2024-09-24 NOTE — PROGRESS NOTES
[facility] - Gastroenterology                                                                                                      Clinic Follow-up Visit    Chief Complaint   Patient presents with    Follow - Up       Subjective/HPI:   83-year-old man with history of A-fib on Xarelto, Alzheimer's who presents today for hospital follow-up.    The patient presented to the hospital in August 2024 with shortness of breath, drop in hemoglobin as well as bright red blood per rectum.    Colonoscopy was done and notable for large prolapsing internal hemorrhoids that were the etiology of his rectal bleeding.  He had a hemorrhoidectomy with Dr. Sosa last month and since then has had no issues.  He is passing formed stools without the presence of blood.  His hemoglobin is up compared to hospitalization.    He overall feels well and denies any complaints today.      Wt Readings from Last 6 Encounters:   09/24/24 210 lb (95.3 kg)   08/29/24 214 lb (97.1 kg)   08/22/24 216 lb 9.6 oz (98.2 kg)   05/09/24 221 lb (100.2 kg)   02/29/24 222 lb (100.7 kg)   01/02/24 222 lb (100.7 kg)        History, Medications, Allergies, ROS:      Past Medical History:    Age-related nuclear cataract of both eyes    Arrhythmia    Asteroid hyalosis of left eye    Atrial fibrillation (HCC)    Disorder of thyroid    Essential hypertension    High blood pressure    Hypertension    Hyperthyroidism    Meibomian gland dysfunction (MGD) of upper and lower lids of both eyes    Neck mass    left-FNA    Unspecified essential hypertension      Past Surgical History:   Procedure Laterality Date    Cataract extraction w/  intraocular lens implant Right 02/15/2016    RJM; significant anxiety in OR, was on Propofol Drip; suggest general anesthesia for OS     Colonoscopy N/A 08/16/2024    ;    Colonoscopy N/A 8/16/2024    Procedure: COLONOSCOPY;  Surgeon: Travis  Kaiser Dooley MD;  Location: [facility] ENDOSCOPY    Electrocardiogram, complete  07/17/2012    scanned to media tab, 07-      Family History   Problem Relation Age of Onset    Cancer Father         lung    Diabetes Neg     Glaucoma Neg     Macular degeneration Neg       Social History:   Social History     Socioeconomic History    Marital status:    Tobacco Use    Smoking status: Never     Passive exposure: Never    Smokeless tobacco: Never   Vaping Use    Vaping status: Never Used   Substance and Sexual Activity    Alcohol use: No    Drug use: No   Other Topics Concern    Caffeine Concern Yes     Comment: coffee, 2 cups daily     Social Determinants of Health     Food Insecurity: No Food Insecurity (8/15/2024)    Food Insecurity     Food Insecurity: Never true   Transportation Needs: No Transportation Needs (8/15/2024)    Transportation Needs     Lack of Transportation: No   Housing Stability: Low Risk  (8/15/2024)    Housing Stability     Housing Instability: No        Medications (Active prior to today's visit):  Current Outpatient Medications   Medication Sig Dispense Refill    Ferrous Sulfate 325 (65 Fe) MG Oral Tab Take 1 tablet (325 mg total) by mouth daily with breakfast. 30 tablet 0    hydrocortisone (PROCTO-MED HC) 2.5 % External Cream Place 1 Application rectally daily as needed for Hemorrhoids. 56 g 1    propylthiouracil 50 MG Oral Tab Take 2 tablets (100 mg total) by mouth 3 (three) times daily. 540 tablet 3    rosuvastatin 10 MG Oral Tab rosuvastatin 10 mg tablet, [RxNorm: 421913]      furosemide 20 MG Oral Tab Take 2 tablets (40 mg total) by mouth daily. 180 tablet 3    enalapril 20 MG Oral Tab Take 1 tablet (20 mg total) by mouth daily. 90 tablet 1    Rivaroxaban (XARELTO) 20 MG Oral Tab Take 1 tablet (20 mg total) by mouth daily with food. 30 tablet 0    risperiDONE 0.5 MG Oral Tab Take 1 tablet (0.5 mg total) by mouth nightly.         Allergies:  No Known Allergies    ROS:    CONSTITUTIONAL:  negative for fevers, chills  EYES:  negative for change in vision  RESPIRATORY:  negative for  shortness of breath  CARDIOVASCULAR:  negative for  chest pain  GASTROINTESTINAL:  see HPI  GENITOURINARY:  negative for dysuria  INTEGUMENT/BREAST:  SKIN:  negative for  rash  ALLERGIC/IMMUNOLOGIC:  negative for hay fever  ENDOCRINE:  negative for cold intolerance and heat intolerance  MUSCULOSKELETAL:  negative for  joint stiffness and joint swelling  BEHAVIOR/PSYCH:  negative for depressed mood    PHYSICAL EXAM:   Blood pressure 144/70, pulse 86, height 5' 6\" (1.676 m), weight 210 lb (95.3 kg).    Gen- Patient appears comfortable and in no acute discomfort  HEENT: the sclera appears anicteric, oropharynx clear, mucus membranes appear moist  CV- regular rate and rhythm, the extremities are warm and well perfused   Lung- Moves air well; No labored breathing  Abdomen- soft, non-tender exam in all quadrants without rigidity or guarding, non-distended, no masses are palpated  Skin- No jaundice  Ext: no edema is evident.   Neuro- Alert and oriented x4, and patient is having movement of all 4 extremities   Psych - appropriate, non-agitated    Labs/Imaging:     Patient's labs and imaging were reviewed and discussed with patient today.     .  ASSESSMENT/PLAN:   83-year-old man with history of A-fib on Xarelto, Alzheimer's who presents today for hospital follow-up.    Patient presented to the hospital last month with prior blood per rectum and drop in hemoglobin.  Colonoscopy was done and unremarkable for an etiology of bright blood per rectum except for large prolapsed internal hemorrhoids.    He underwent a hemorrhoidectomy with Dr. Sosa and since then has done well.  His hemoglobin is gone up since the procedure and he has no further rectal bleeding.    Patient can follow-up as needed    Orders This Visit:  No orders of the defined types were placed in this encounter.      Meds This Visit:  Requested  Prescriptions      No prescriptions requested or ordered in this encounter       Imaging & Referrals:  None     Kaiser Robles MD  [facility]  9/24/2024

## 2024-10-02 NOTE — TELEPHONE ENCOUNTER
Please review. Protocol Failed; No Protocol    Requested Prescriptions   Pending Prescriptions Disp Refills    FEROSUL 325 (65 Fe) MG Oral Tab [Pharmacy Med Name: Ferosul 325 Mg Tab Selena] 30 tablet 0     Sig: Take 1 tablet (325 mg total) by mouth daily with breakfast.       There is no refill protocol information for this order              Recent Outpatient Visits              1 week ago Hemorrhoids, unspecified hemorrhoid type    [facility], Kaiser Oliva MD    Office Visit    1 month ago Iron deficiency anemia due to chronic blood loss    [facility]    [facility]    4 months ago Localized edema    [facility]     Office Visit    9 months ago Primary osteoarthritis of right knee    [facility]    Office Visit    9 months ago Alzheimer's disease, unspecified (HCC)    [facility]    [facility] Visit

## 2024-10-09 RX ORDER — FERROUS SULFATE 325(65) MG
1 TABLET ORAL
Qty: 30 TABLET | Refills: 0 | Status: SHIPPED | OUTPATIENT
Start: 2024-10-09 | End: 2024-11-08

## 2024-10-24 RX ORDER — HYDROCORTISONE 25 MG/G
1 CREAM TOPICAL DAILY PRN
Qty: 56 G | Refills: 2 | Status: SHIPPED | OUTPATIENT
Start: 2024-10-24

## 2024-10-24 NOTE — TELEPHONE ENCOUNTER
Refill passes per [facility] protocol.    No future appointments.  Last office visit: 8/29/2024    Requested Prescriptions   Pending Prescriptions Disp Refills    PROCTO-MED HC 2.5 % External Cream [Pharmacy Med Name: Procto-Med Hc 2.5 % Cre Lead] 56 g 0     Sig: Place 1 Application rectally daily as needed for Hemorrhoids.       Gastrointestional Medication Protocol Passed - 10/24/2024  3:27 PM        Passed - In person appointment or virtual visit in the past 12 mos or appointment in next 3 mos     Recent Outpatient Visits              1 month ago Hemorrhoids, unspecified hemorrhoid type    [facility], Kaiser Oliva MD    Office Visit    1 month ago Iron deficiency anemia due to chronic blood loss    [facility], Mehrdad Diego,     Office Visit    5 months ago Localized edema    [facility], Mehrdad Diego,     Office Visit    9 months ago Primary osteoarthritis of right knee    [facility], Jose Angel Engle MD    Office Visit    10 months ago Alzheimer's disease, unspecified (HCC)    [facility], Mehrdad Diego,     Office Visit                             Recent Outpatient Visits              1 month ago Hemorrhoids, unspecified hemorrhoid type    [facility], Kaiser Oliva MD    Office Visit    1 month ago Iron deficiency anemia due to chronic blood loss    [facility]     Office Visit    5 months ago Localized edema    [facility]     Office Visit    9 months ago Primary osteoarthritis of right knee    [facility], Jose Angel Engle MD    Office Visit    10  months ago Alzheimer's disease, unspecified (HCC)    [facility], [address], [place] Mehrdad Solano, DO    Office Visit

## 2024-11-08 RX ORDER — FERROUS SULFATE 325(65) MG
1 TABLET ORAL
Qty: 30 TABLET | Refills: 0 | Status: SHIPPED | OUTPATIENT
Start: 2024-11-08

## 2024-11-08 NOTE — TELEPHONE ENCOUNTER
Please review. Protocol Failed; No Protocol    Requested Prescriptions   Pending Prescriptions Disp Refills    FEROSUL 325 (65 Fe) MG Oral Tab [Pharmacy Med Name: Ferosul 325 Mg Tab Selena] 30 tablet 0     Sig: Take 1 tablet  by mouth daily with breakfast       There is no refill protocol information for this order              Recent Outpatient Visits              1 month ago Hemorrhoids, unspecified hemorrhoid type    [facility], Kaiser Oliva MD    Office Visit    2 months ago Iron deficiency anemia due to chronic blood loss    [facility], [address], Mehrdad Diego DO    Office Visit    6 months ago Localized edema    [facility]     [facility]    10 months ago Primary osteoarthritis of right knee    [facility]    [facility] Visit    11 months ago Alzheimer's disease, unspecified (HCC)    [facility]     [facility] Visit

## 2024-11-13 RX ORDER — LIDOCAINE HYDROCHLORIDE 20 MG/ML
1 SOLUTION ORAL; TOPICAL
Qty: 30 TABLET | Refills: 0 | OUTPATIENT
Start: 2024-11-13

## 2024-12-26 NOTE — TELEPHONE ENCOUNTER
Please review. Protocol Failed; No Protocol    Requested Prescriptions   Pending Prescriptions Disp Refills    FEROSUL 325 (65 Fe) MG Oral Tab [Pharmacy Med Name: Ferosul 325 Mg Tab Selena] 30 tablet 0     Sig: TAKE ONE TABLET BY MOUTH ONE TIME DAILY WITH BREAKFAST       There is no refill protocol information for this order              Recent Outpatient Visits              3 months ago Hemorrhoids, unspecified hemorrhoid type    [facility], Kaiser Oliva MD    Office Visit    3 months ago Iron deficiency anemia due to chronic blood loss    [facility], [address], Mehrdad Diego DO    Office Visit    7 months ago Localized edema    [facility]    [facility]    11 months ago Primary osteoarthritis of right knee    [facility]    [facility] Visit    1 year ago Alzheimer's disease, unspecified (HCC)    [facility], Mehrdad Diego DO    Office Visit

## 2024-12-27 RX ORDER — FERROUS SULFATE 325(65) MG
1 TABLET ORAL
Qty: 30 TABLET | Refills: 0 | Status: SHIPPED | OUTPATIENT
Start: 2024-12-27

## 2025-01-29 NOTE — TELEPHONE ENCOUNTER
Please review. Protocol Failed; No Protocol    Requested Prescriptions   Pending Prescriptions Disp Refills    FEROSUL 325 (65 Fe) MG Oral Tab [Pharmacy Med Name: Ferosul 325 Mg Tab Selena] 30 tablet 0     Sig: Take 1 tablet (325 mg total) by mouth daily with breakfast.       There is no refill protocol information for this order            Future Appointments         Provider Department Appt Notes    In 6 days Mehrdad Solano DO [facility] Physical 12/14/2023          Recent Outpatient Visits              4 months ago Hemorrhoids, unspecified hemorrhoid type    [facility]    Office Visit    5 months ago Iron deficiency anemia due to chronic blood loss    [facility]    [facility]    8 months ago Localized edema    [facility]    Office Visit    1 year ago Primary osteoarthritis of right knee    [facility]    Office Visit    1 year ago Alzheimer's disease, unspecified (HCC)    [facility]    [facility] Visit

## 2025-01-30 RX ORDER — FERROUS SULFATE 325(65) MG
1 TABLET ORAL
Qty: 30 TABLET | Refills: 0 | Status: SHIPPED | OUTPATIENT
Start: 2025-01-30

## 2025-02-04 ENCOUNTER — LAB ENCOUNTER (OUTPATIENT)
Dept: LAB | Age: 85
End: 2025-02-04
Attending: FAMILY MEDICINE
Payer: MEDICARE

## 2025-02-04 DIAGNOSIS — D64.9 SEVERE ANEMIA: ICD-10-CM

## 2025-02-04 DIAGNOSIS — E78.00 PURE HYPERCHOLESTEROLEMIA: ICD-10-CM

## 2025-02-04 DIAGNOSIS — E05.90 HYPERTHYROIDISM: ICD-10-CM

## 2025-02-04 PROBLEM — K92.2 GI BLEED: Status: RESOLVED | Noted: 2024-08-15 | Resolved: 2025-02-04

## 2025-02-04 LAB
ALBUMIN SERPL-MCNC: 4.5 G/DL (ref 3.2–4.8)
ALBUMIN/GLOB SERPL: 1.5 {RATIO} (ref 1–2)
ALP LIVER SERPL-CCNC: 69 U/L
ALT SERPL-CCNC: 15 U/L
ANION GAP SERPL CALC-SCNC: 7 MMOL/L (ref 0–18)
AST SERPL-CCNC: 21 U/L (ref ?–34)
BASOPHILS # BLD AUTO: 0.02 X10(3) UL (ref 0–0.2)
BASOPHILS NFR BLD AUTO: 0.3 %
BILIRUB SERPL-MCNC: 0.7 MG/DL (ref 0.2–1.1)
BUN BLD-MCNC: 21 MG/DL (ref 9–23)
BUN/CREAT SERPL: 22.1 (ref 10–20)
CALCIUM BLD-MCNC: 9.6 MG/DL (ref 8.7–10.4)
CHLORIDE SERPL-SCNC: 106 MMOL/L (ref 98–112)
CHOLEST SERPL-MCNC: 163 MG/DL (ref ?–200)
CO2 SERPL-SCNC: 31 MMOL/L (ref 21–32)
CREAT BLD-MCNC: 0.95 MG/DL
DEPRECATED RDW RBC AUTO: 49.8 FL (ref 35.1–46.3)
EGFRCR SERPLBLD CKD-EPI 2021: 78 ML/MIN/1.73M2 (ref 60–?)
EOSINOPHIL # BLD AUTO: 0.06 X10(3) UL (ref 0–0.7)
EOSINOPHIL NFR BLD AUTO: 1 %
ERYTHROCYTE [DISTWIDTH] IN BLOOD BY AUTOMATED COUNT: 15.9 % (ref 11–15)
FASTING PATIENT LIPID ANSWER: NO
FASTING STATUS PATIENT QL REPORTED: NO
GLOBULIN PLAS-MCNC: 3.1 G/DL (ref 2–3.5)
GLUCOSE BLD-MCNC: 98 MG/DL (ref 70–99)
HCT VFR BLD AUTO: 45.9 %
HDLC SERPL-MCNC: 50 MG/DL (ref 40–59)
HGB BLD-MCNC: 14.6 G/DL
IMM GRANULOCYTES # BLD AUTO: 0.01 X10(3) UL (ref 0–1)
IMM GRANULOCYTES NFR BLD: 0.2 %
IRON SATN MFR SERPL: 33 %
IRON SERPL-MCNC: 61 UG/DL
LDLC SERPL CALC-MCNC: 95 MG/DL (ref ?–100)
LYMPHOCYTES # BLD AUTO: 2.78 X10(3) UL (ref 1–4)
LYMPHOCYTES NFR BLD AUTO: 44.7 %
MCH RBC QN AUTO: 27.3 PG (ref 26–34)
MCHC RBC AUTO-ENTMCNC: 31.8 G/DL (ref 31–37)
MCV RBC AUTO: 85.8 FL
MONOCYTES # BLD AUTO: 0.57 X10(3) UL (ref 0.1–1)
MONOCYTES NFR BLD AUTO: 9.2 %
NEUTROPHILS # BLD AUTO: 2.78 X10 (3) UL (ref 1.5–7.7)
NEUTROPHILS # BLD AUTO: 2.78 X10(3) UL (ref 1.5–7.7)
NEUTROPHILS NFR BLD AUTO: 44.6 %
NONHDLC SERPL-MCNC: 113 MG/DL (ref ?–130)
OSMOLALITY SERPL CALC.SUM OF ELEC: 301 MOSM/KG (ref 275–295)
PLATELET # BLD AUTO: 134 10(3)UL (ref 150–450)
POTASSIUM SERPL-SCNC: 4.1 MMOL/L (ref 3.5–5.1)
PROT SERPL-MCNC: 7.6 G/DL (ref 5.7–8.2)
RBC # BLD AUTO: 5.35 X10(6)UL
SODIUM SERPL-SCNC: 144 MMOL/L (ref 136–145)
T4 FREE SERPL-MCNC: 1.1 NG/DL (ref 0.8–1.7)
TOTAL IRON BINDING CAPACITY: 186 UG/DL (ref 250–425)
TRANSFERRIN SERPL-MCNC: 140 MG/DL (ref 215–365)
TRIGL SERPL-MCNC: 96 MG/DL (ref 30–149)
TSI SER-ACNC: 7.32 UIU/ML (ref 0.55–4.78)
VLDLC SERPL CALC-MCNC: 16 MG/DL (ref 0–30)
WBC # BLD AUTO: 6.2 X10(3) UL (ref 4–11)

## 2025-02-04 PROCEDURE — 80053 COMPREHEN METABOLIC PANEL: CPT

## 2025-02-04 PROCEDURE — 84443 ASSAY THYROID STIM HORMONE: CPT

## 2025-02-04 PROCEDURE — 80061 LIPID PANEL: CPT

## 2025-02-04 PROCEDURE — 85025 COMPLETE CBC W/AUTO DIFF WBC: CPT

## 2025-02-04 PROCEDURE — 36415 COLL VENOUS BLD VENIPUNCTURE: CPT

## 2025-02-04 PROCEDURE — 84439 ASSAY OF FREE THYROXINE: CPT

## 2025-02-04 PROCEDURE — 84466 ASSAY OF TRANSFERRIN: CPT

## 2025-02-04 PROCEDURE — 83540 ASSAY OF IRON: CPT

## 2025-02-28 RX ORDER — FUROSEMIDE 20 MG/1
40 TABLET ORAL DAILY
Qty: 180 TABLET | Refills: 3 | Status: SHIPPED | OUTPATIENT
Start: 2025-02-28

## 2025-03-03 RX ORDER — FERROUS SULFATE 325(65) MG
1 TABLET ORAL
Qty: 90 TABLET | Refills: 3 | Status: SHIPPED | OUTPATIENT
Start: 2025-03-03

## 2025-03-03 NOTE — TELEPHONE ENCOUNTER
Please Review. Protocol Failed; No Protocol     Requested Prescriptions   Pending Prescriptions Disp Refills    FEROSUL 325 (65 Fe) MG Oral Tab [Pharmacy Med Name: Ferosul 325 Mg Tab Selena] 30 tablet 0     Sig: Take 1 tablet  by mouth daily with breakfast.       There is no refill protocol information for this order

## 2025-06-24 RX ORDER — PROPYLTHIOURACIL 50 MG/1
100 TABLET ORAL 3 TIMES DAILY
Qty: 540 TABLET | Refills: 0 | Status: SHIPPED | OUTPATIENT
Start: 2025-06-24

## 2025-07-17 ENCOUNTER — MED REC SCAN ONLY (OUTPATIENT)
Dept: FAMILY MEDICINE CLINIC | Facility: CLINIC | Age: 85
End: 2025-07-17

## (undated) DEVICE — GAUZE,SPONGE,4"X4",16PLY,XRAY,STRL,LF: Brand: MEDLINE

## (undated) DEVICE — KIT CLEAN ENDOKIT 1.1OZ GOWNX2

## (undated) DEVICE — YANKAUER,FLEXIBLE HANDLE,REGLR CAPACITY: Brand: MEDLINE INDUSTRIES, INC.

## (undated) DEVICE — ENCORE® LATEX MICRO SIZE 8.5, STERILE LATEX POWDER-FREE SURGICAL GLOVE: Brand: ENCORE

## (undated) DEVICE — CO2 CANNULA,SSOFT,ADLT,7O2,4CO2,FEMALE: Brand: MEDLINE

## (undated) DEVICE — DRAPE,LITHOTOMY,STERILE: Brand: MEDLINE

## (undated) DEVICE — SKIN PREP TRAY 4 COMPARTM TRAY: Brand: MEDLINE INDUSTRIES, INC.

## (undated) DEVICE — Device

## (undated) DEVICE — MINOR GENERAL: Brand: MEDLINE INDUSTRIES, INC.

## (undated) DEVICE — PAD,ABDOMINAL,8"X7.5",STERILE,LF,1/PK: Brand: MEDLINE

## (undated) DEVICE — DRAPE,UNDRBUT,WHT GRAD PCH,CAPPORT,20/CS: Brand: MEDLINE

## (undated) DEVICE — SYRINGE,EAR/ULCER, 2 OZ, STERILE: Brand: MEDLINE

## (undated) DEVICE — SOLUTION IV 1000ML 0.9% NACL PRESERVATIVE

## (undated) DEVICE — SHEET, T, LAPAROTOMY, STERILE: Brand: MEDLINE

## (undated) DEVICE — GLOVE SUR 8.5 SENSICARE NEOPR PWD F

## (undated) DEVICE — ZZ-DISC-SUB-422514-SUT COAT VCRL 0 27IN ABSRB VLT 36MM UR-5

## (undated) DEVICE — SOLUTION IRRIG 1000ML 0.9% NACL USP BTL

## (undated) DEVICE — JELLY,LUBE,STERILE,FLIP TOP,TUBE,2-OZ: Brand: MEDLINE

## (undated) DEVICE — SINGLE-USE SNARE: Brand: CAPTIVATOR™ COLD

## (undated) DEVICE — SUT CHRM GUT 2-0 27IN UR-6 ABSRB UD 26MM 5/8

## (undated) DEVICE — SPONGE 4X4 10PK

## (undated) DEVICE — ELECTRODE ES RET 2 PATE W/ 10FT CRD MPLR DISP

## (undated) DEVICE — MEDI-VAC NON-CONDUCTIVE SUCTION TUBING 6MM X 1.8M (6FT.) L: Brand: CARDINAL HEALTH

## (undated) DEVICE — HANDLE SUR BLU PLAS LT FLX SLIP ON ST DISP

## (undated) DEVICE — STRAP OR POS W3.5XL19IN FOAM STRRP W/ SLIP

## (undated) DEVICE — STERILE H2O FOR IRRIG .

## (undated) DEVICE — KIT MFLD FOR SPEC COLL

## (undated) DEVICE — KIT ENDO ORCAPOD 160/180/190

## (undated) DEVICE — SIGMOIDOSCOPE LIGHTED BIOSEAL

## (undated) DEVICE — SYRINGE, LUER SLIP, STERILE, 60ML: Brand: MEDLINE

## (undated) DEVICE — SUCTION CANISTER, 3000CC,SAFELINER: Brand: DEROYAL

## (undated) DEVICE — SUT MCRYL 2-0 27IN SH ABSRB UD 26MM 1/2 CIR

## (undated) DEVICE — PAD PREP 24X43.5IN W/ PCH

## (undated) NOTE — LETTER
[place] [address]  Authorization for Invasive Procedures  Date: 08/15/2024           Time: 1653  1  I hereby authorize , my physician and his/her assistants (if applicable), which may include medical students, residents, and/or fellows, to perform the following surgical operation/ procedure and administer such anesthesia as may be determined necessary by my physician: *** on Itzel Plaslior  2.   I recognize that during the surgical operation/procedure, unforeseen conditions may necessitate additional or different procedures than those listed above.  I, therefore, further authorize and request that the above-named surgeon, assistants, or designees perform such procedures as are, in their judgment, necessary and desirable.    3.   My surgeon/physician has discussed prior to my surgery the potential benefits, risks and side effects of this procedure; the likelihood of achieving goals; and potential problems that might occur during recuperation.  They also discussed reasonable alternatives to the procedure, including risks, benefits, and side effects related to the alternatives and risks related to not receiving this procedure.  I have had all my questions answered and I acknowledge that no guarantee has been made as to the result that may be obtained.    4.   Should the need arise during my operation/procedure, which includes change of level of care prior to discharge, I also consent to the administration of blood and/or blood products.  Further, I understand that despite careful testing and screening of blood or blood products by collecting agencies, I may still be subject to ill effects as a result of receiving a blood transfusion and/or blood products.  The following are some, but not all, of the potential risks that can occur: fever and allergic reactions, hemolytic reactions, transmission of diseases such as Hepatitis, AIDS and Cytomegalovirus (CMV) and fluid overload.  In the event  that I wish to have an autologous transfusion of my own blood, or a directed donor transfusion, I will discuss this with my physician.   Check only if Refusing Blood or Blood Products  I understand refusal of blood or blood products as deemed necessary by my physician may have serious consequences to my condition to include possible death. I hereby assume responsibility for my refusal and release the hospital, its personnel, and my physicians from any responsibility for the consequences of my refusal.         o  Refuse         5.   I authorize the use of any specimen, organs, tissues, body parts or foreign objects that may be removed from my body during the operation/procedure for diagnosis, research or teaching purposes and their subsequent disposal by hospital authorities.  I also authorize the release of specimen test results and/or written reports to my treating physician on the hospital medical staff or other referring or consulting physicians involved in my care, at the discretion of the Pathologist or my treating physician.    6.   I consent to the photographing or videotaping of the operations or procedures to be performed, including appropriate portions of my body for medical, scientific, or educational purposes, provided my identity is not revealed by the pictures or by descriptive texts accompanying them.  If the procedure has been photographed/videotaped, the surgeon will obtain the original picture, image, videotape or CD.  The hospital will not be responsible for storage, release or maintenance of the picture, image, tape or CD.    7.   I consent to the presence of a  or observers in the operating room as deemed necessary by my physician or their designees.    8.   I recognize that in the event my procedure results in extended X-Ray/fluoroscopy time, I may develop a skin reaction.    9. If I have a Do Not Attempt Resuscitation (DNAR) order in place, that status will be suspended while  in the operating room, procedural suite, and during the recovery period unless otherwise explicitly stated by me (or a person authorized to consent on my behalf). The surgeon or my attending physician will determine when the applicable recovery period ends for purposes of reinstating the DNAR order.  10. Patients having a sterilization procedure: I understand that if the procedure is successful the results will be permanent and it will therefore be impossible for me to inseminate, conceive, or bear children.  I also understand that the procedure is intended to result in sterility, although the result has not been guaranteed.   11. I acknowledge that my physician has explained sedation/analgesia administration to me including the risk and benefits I consent to the administration of sedation/analgesia as may be necessary or desirable in the judgment of my physician.    I CERTIFY THAT I HAVE READ AND FULLY UNDERSTAND THE ABOVE CONSENT TO OPERATION and/or OTHER PROCEDURE.        ____________________________________       _________________________________      ______________________________  Signature of Patient         Signature of Responsible Person        Printed Name of Responsible Person        ____________________________________      _________________________________      ______________________________       Signature of Witness          Relationship to Patient                       Date                                       Time  Patient Name: Itzel De Leon  : 1940    Reviewed: 2024   Printed: August 15, 2024  Medical Record #: [id number] Page 1 of 2             STATEMENT OF PHYSICIAN My signature below affirms that prior to the time of the procedure; I have explained to the patient and/or his/her legal representative, the risks and benefits involved in the proposed treatment and any reasonable alternative to the proposed treatment. I have also explained the risks and benefits involved in refusal of  the proposed treatment and alternatives to the proposed treatment and have answered the patient's questions. If I have a significant financial interest in a co-management agreement or a significant financial interest in any product or implant, or other significant relationship used in this procedure/surgery, I have disclosed this and had a discussion with my patient.     _______________________________________________________________ _____________________________  (Signature of Physician)                                                                                         (Date)                                   (Time)  Patient Name: Itzel De Leon  : 1940    Reviewed: 2024   Printed: August 15, 2024  Medical Record #: [id number] Page 2 of 2

## (undated) NOTE — LETTER
[place] [address]  Authorization for Invasive Procedures  Date: 08/18/2024           Time: 1231    I hereby authorize Kalyan Sosa MD, my physician and his/her assistants (if applicable), which may include medical students, residents, and/or fellows, to perform the following surgical operation/ procedure and administer such anesthesia as may be determined necessary by my physician: hemorrhoidectomy  proctoscopy on [facility]  2.   I recognize that during the surgical operation/procedure, unforeseen conditions may necessitate additional or different procedures than those listed above.  I, therefore, further authorize and request that the above-named surgeon, assistants, or designees perform such procedures as are, in their judgment, necessary and desirable.    3.   My surgeon/physician has discussed prior to my surgery the potential benefits, risks and side effects of this procedure; the likelihood of achieving goals; and potential problems that might occur during recuperation.  They also discussed reasonable alternatives to the procedure, including risks, benefits, and side effects related to the alternatives and risks related to not receiving this procedure.  I have had all my questions answered and I acknowledge that no guarantee has been made as to the result that may be obtained.    4.   Should the need arise during my operation/procedure, which includes change of level of care prior to discharge, I also consent to the administration of blood and/or blood products.  Further, I understand that despite careful testing and screening of blood or blood products by collecting agencies, I may still be subject to ill effects as a result of receiving a blood transfusion and/or blood products.  The following are some, but not all, of the potential risks that can occur: fever and allergic reactions, hemolytic reactions, transmission of diseases such as Hepatitis, AIDS and  Cytomegalovirus (CMV) and fluid overload.  In the event that I wish to have an autologous transfusion of my own blood, or a directed donor transfusion, I will discuss this with my physician.   Check only if Refusing Blood or Blood Products  I understand refusal of blood or blood products as deemed necessary by my physician may have serious consequences to my condition to include possible death. I hereby assume responsibility for my refusal and release the hospital, its personnel, and my physicians from any responsibility for the consequences of my refusal.         o  Refuse         5.   I authorize the use of any specimen, organs, tissues, body parts or foreign objects that may be removed from my body during the operation/procedure for diagnosis, research or teaching purposes and their subsequent disposal by hospital authorities.  I also authorize the release of specimen test results and/or written reports to my treating physician on the hospital medical staff or other referring or consulting physicians involved in my care, at the discretion of the Pathologist or my treating physician.    6.   I consent to the photographing or videotaping of the operations or procedures to be performed, including appropriate portions of my body for medical, scientific, or educational purposes, provided my identity is not revealed by the pictures or by descriptive texts accompanying them.  If the procedure has been photographed/videotaped, the surgeon will obtain the original picture, image, videotape or CD.  The hospital will not be responsible for storage, release or maintenance of the picture, image, tape or CD.    7.   I consent to the presence of a  or observers in the operating room as deemed necessary by my physician or their designees.    8.   I recognize that in the event my procedure results in extended X-Ray/fluoroscopy time, I may develop a skin reaction.    9. If I have a Do Not Attempt Resuscitation  (DNAR) order in place, that status will be suspended while in the operating room, procedural suite, and during the recovery period unless otherwise explicitly stated by me (or a person authorized to consent on my behalf). The surgeon or my attending physician will determine when the applicable recovery period ends for purposes of reinstating the DNAR order.  10. Patients having a sterilization procedure: I understand that if the procedure is successful the results will be permanent and it will therefore be impossible for me to inseminate, conceive, or bear children.  I also understand that the procedure is intended to result in sterility, although the result has not been guaranteed.   11. I acknowledge that my physician has explained sedation/analgesia administration to me including the risk and benefits I consent to the administration of sedation/analgesia as may be necessary or desirable in the judgment of my physician.    I CERTIFY THAT I HAVE READ AND FULLY UNDERSTAND THE ABOVE CONSENT TO OPERATION and/or OTHER PROCEDURE.        ____________________________________       _________________________________      ______________________________  Signature of Patient         Signature of Responsible Person        Printed Name of Responsible Person        ____________________________________      _________________________________      ______________________________       Signature of Witness          Relationship to Patient                       Date                                       Time  Patient Name: Itzel De Leon  : 1940    Reviewed: 2024   Printed: 2024  Medical Record #: [id number] Page 1 of 2             STATEMENT OF PHYSICIAN My signature below affirms that prior to the time of the procedure; I have explained to the patient and/or his/her legal representative, the risks and benefits involved in the proposed treatment and any reasonable alternative to the proposed treatment. I have  also explained the risks and benefits involved in refusal of the proposed treatment and alternatives to the proposed treatment and have answered the patient's questions. If I have a significant financial interest in a co-management agreement or a significant financial interest in any product or implant, or other significant relationship used in this procedure/surgery, I have disclosed this and had a discussion with my patient.     _______________________________________________________________ _____________________________  (Signature of Physician)                                                                                         (Date)                                   (Time)  Patient Name: Itzel De Leon  : 1940    Reviewed: 2024   Printed: 2024  Medical Record #: [id number] Page 2 of 2

## (undated) NOTE — LETTER
AUTHORIZATION FOR SURGICAL OPERATION OR OTHER PROCEDURE    1. I hereby authorize Stephanie Bernstein , and [place] [facility] staff assigned to my case to perform the following operation and/or procedure at the [facility], [facility]:    _______________________________________________________________________________________________    Right knee injection  _______________________________________________________________________________________________    2. My physician has explained the nature and purpose of the operation or other procedure, possible alternative methods of treatment, the risks involved, and the possibility of complication to me. I acknowledge that no guarantee has been made as to the result that may be obtained. 3.  I recognize that, during the course of this operation, or other procedure, unforseen conditions may necessitate additional or different procedure than those listed above. I, therefore, further authorize and request that the above named physician, his/her physician assistants or designees perform such procedures as are, in his/her professional opinion, necessary and desirable. 4.  Any tissue or organs removed in the operation or other procedure may be disposed of by and at the discretion of the [facility], [facility] and [facility]. 5.  I understand that in the event of a medical emergency, I will be transported by local paramedics to [facility] or other [facility] emergency department. 6.  I certify that I have read and fully understand the above consent to operation and/or other procedure. 7.  I acknowledge that my physician has explained sedation/analgesia administration to me including the risks and benefits. I consent to the administration of sedation/analgesia as may be necessary or desirable in the judgement of my physician.     Witness signature: ___________________________________________________ Date:  ______/______/_____                    Time:  ________ A.M.  P.M. Patient Name:  ______________________________________________________  (please print)      Patient signature:  ___________________________________________________             Relationship to Patient:           []  Parent    Responsible person                          []  Spouse  In case of minor or                    [] Other  _____________   Incompetent name:  __________________________________________________                               (please print)      _____________      Responsible person  In case of minor or  Incompetent signature:  _______________________________________________    Statement of Physician  My signature below affirms that prior to the time of the procedure, I have explained to the patient and/or his/her guardian, the risks and benefits involved in the proposed treatment and any reasonable alternative to the proposed treatment. I have also explained the risks and benefits involved in the refusal of the proposed treatment and have answered the patient's questions.                         Date:  ______/______/_______  Provider                      Signature:  __________________________________________________________       Time:  ___________ A.M    P.M.

## (undated) NOTE — LETTER
[place] [address]  Authorization for Invasive Procedures  Date: 08/15/2024           Time: 1654    I hereby authorize MICHELLE Robles MD, my physician and his/her assistants (if applicable), which may include medical students, residents, and/or fellows, to perform the following surgical operation/ procedure and administer such anesthesia as may be determined necessary by my physician: colonoscopy on Itzel Plasari  2.   I recognize that during the surgical operation/procedure, unforeseen conditions may necessitate additional or different procedures than those listed above.  I, therefore, further authorize and request that the above-named surgeon, assistants, or designees perform such procedures as are, in their judgment, necessary and desirable.    3.   My surgeon/physician has discussed prior to my surgery the potential benefits, risks and side effects of this procedure; the likelihood of achieving goals; and potential problems that might occur during recuperation.  They also discussed reasonable alternatives to the procedure, including risks, benefits, and side effects related to the alternatives and risks related to not receiving this procedure.  I have had all my questions answered and I acknowledge that no guarantee has been made as to the result that may be obtained.    4.   Should the need arise during my operation/procedure, which includes change of level of care prior to discharge, I also consent to the administration of blood and/or blood products.  Further, I understand that despite careful testing and screening of blood or blood products by collecting agencies, I may still be subject to ill effects as a result of receiving a blood transfusion and/or blood products.  The following are some, but not all, of the potential risks that can occur: fever and allergic reactions, hemolytic reactions, transmission of diseases such as Hepatitis, AIDS and Cytomegalovirus (CMV) and fluid  overload.  In the event that I wish to have an autologous transfusion of my own blood, or a directed donor transfusion, I will discuss this with my physician.   Check only if Refusing Blood or Blood Products  I understand refusal of blood or blood products as deemed necessary by my physician may have serious consequences to my condition to include possible death. I hereby assume responsibility for my refusal and release the hospital, its personnel, and my physicians from any responsibility for the consequences of my refusal.         o  Refuse         5.   I authorize the use of any specimen, organs, tissues, body parts or foreign objects that may be removed from my body during the operation/procedure for diagnosis, research or teaching purposes and their subsequent disposal by hospital authorities.  I also authorize the release of specimen test results and/or written reports to my treating physician on the hospital medical staff or other referring or consulting physicians involved in my care, at the discretion of the Pathologist or my treating physician.    6.   I consent to the photographing or videotaping of the operations or procedures to be performed, including appropriate portions of my body for medical, scientific, or educational purposes, provided my identity is not revealed by the pictures or by descriptive texts accompanying them.  If the procedure has been photographed/videotaped, the surgeon will obtain the original picture, image, videotape or CD.  The hospital will not be responsible for storage, release or maintenance of the picture, image, tape or CD.    7.   I consent to the presence of a  or observers in the operating room as deemed necessary by my physician or their designees.    8.   I recognize that in the event my procedure results in extended X-Ray/fluoroscopy time, I may develop a skin reaction.    9. If I have a Do Not Attempt Resuscitation (DNAR) order in place, that status  will be suspended while in the operating room, procedural suite, and during the recovery period unless otherwise explicitly stated by me (or a person authorized to consent on my behalf). The surgeon or my attending physician will determine when the applicable recovery period ends for purposes of reinstating the DNAR order.  10. Patients having a sterilization procedure: I understand that if the procedure is successful the results will be permanent and it will therefore be impossible for me to inseminate, conceive, or bear children.  I also understand that the procedure is intended to result in sterility, although the result has not been guaranteed.   11. I acknowledge that my physician has explained sedation/analgesia administration to me including the risk and benefits I consent to the administration of sedation/analgesia as may be necessary or desirable in the judgment of my physician.    I CERTIFY THAT I HAVE READ AND FULLY UNDERSTAND THE ABOVE CONSENT TO OPERATION and/or OTHER PROCEDURE.        ____________________________________       _________________________________      ______________________________  Signature of Patient         Signature of Responsible Person        Printed Name of Responsible Person        ____________________________________      _________________________________      ______________________________       Signature of Witness          Relationship to Patient                       Date                                       Time  Patient Name: Itzel De Leon  : 1940    Reviewed: 2024   Printed: August 15, 2024  Medical Record #: [id number] Page 1 of 2             STATEMENT OF PHYSICIAN My signature below affirms that prior to the time of the procedure; I have explained to the patient and/or his/her legal representative, the risks and benefits involved in the proposed treatment and any reasonable alternative to the proposed treatment. I have also explained the risks and  benefits involved in refusal of the proposed treatment and alternatives to the proposed treatment and have answered the patient's questions. If I have a significant financial interest in a co-management agreement or a significant financial interest in any product or implant, or other significant relationship used in this procedure/surgery, I have disclosed this and had a discussion with my patient.     _______________________________________________________________ _____________________________  (Signature of Physician)                                                                                         (Date)                                   (Time)  Patient Name: Itzel De Leon  : 1940    Reviewed: 2024   Printed: August 15, 2024  Medical Record #: [id number] Page 2 of 2

## (undated) NOTE — LETTER
[facility] 5SW/SE  [address]  [facility] [address]  [phone]    Blood Transfusion Consent    In the course of your treatment, it may become necessary to administer a transfusion of blood or blood components. This form provides basic information concerning this procedure and, if signed by you, authorizes its administration. By signing this form, you agree that all of your questions about the administration of blood or blood products have been answered by the ordering medical professional or designee.    Description of Procedure  Blood is introduced into one of your veins, commonly in the arm, using a sterilized disposable needle. The amount of blood transfused, and whether the transfusion will be of blood or blood components is a judgement the physician will make based on your particular needs.    Risks  The transfusion is a common procedure of low risk.  MINOR AND TEMPORARY REACTIONS ARE NOT UNCOMMON, including a slight bruise, swelling or local reaction in the area where the needle pierces your skin, or a nonserious reaction to the transfused material itself, including headache, fever or mild skin reaction, such as rash.  Serious reactions are possible, though very unlikely, and include severe allergic reaction (shock) and destruction (hemolysis) of transfused blood cells.  Infectious diseases which are known to be transmitted by blood transfusion include certain types of viral Hepatitis(liver infection from a virus), Human Immunodeficiency Virus (HIV-1,2) infection, a viral infection known to cause Acquired Immunodeficiency Syndrome (AIDS), as well as certain other bacterial, viral, and parasitic diseases. While a minimal risk of acquiring an infectious disease from transfused blood exists, in accordance with the Federal and State law, all due care has been taken in donor selection and testing to avoid transmission of disease.    Alternatives  If loss of blood poses serious threats during your  treatment, THERE IS NO EFFECTIVE ALTERNATIVE TO BLOOD TRANSFUSION. However, if you have any further questions on this matter, your provider will fully explain the alternatives to you if it has not already been done.    I, ______________________________, have read/had read to me the above. I understand the matters bearing on the decision whether or not to authorize a transfusion of blood or blood components. I have no questions which have not been answered to my full satisfaction. I hereby consent to such transfusion as my physician may deem necessary or advisable in the course of my treatment.    ______________________________________________                    ___________________________  (Signature of Patient or Responsible party in case of minor,                 (Printed Name of Patient or incompetent, or unconscious patient)              Responsible Party)    ___________________________               _____________________  (Relationship to Patient if not self)                                    (Date and Time)    __________________________                                                           ______________________              (Signature of Witness)               (Printed Name of Witness)     Language line ()    Telephone/Verbal/Video Consent    __________________________                     ____________________  (Signature of 2nd Witness           (Printed Name of 2nd  Telephone/Verbal/Video Consent)           Witness)    Patient Name: Itzel De Leon     : 1940                 Printed: August 15, 2024     Medical Record #: [id number]      Rev: 2023

## (undated) NOTE — LETTER
6/8/2018              Yuri Dec        [address] [facility]         Dear Mindy Fernandez,    [facility] records indicate that the tests ordered for you by Maddy Iyer RN  have not been done.   If you have, in fact, already completed the test

## (undated) NOTE — LETTER
12/13/2018              [address] [id number]         To whom it may concern:      Mr. Gamaliel Phillips is a patient of mine and is known to have a diagnosis of dementia.   This diagnosis can result in unusual or cal

## (undated) NOTE — MR AVS SNAPSHOT
[facility] SPECIALTY [facility] [place] [address] [address] [phone]               Thank you for choosing us for your health care visit with CORBIN Oleary.   We are glad to serve you and happy to provide you with this summary TAKE 1 TABLET (40 MG TOTAL) BY MOUTH DAILY. Commonly known as:  LASIX           propylthiouracil 50 MG Tabs   TAKE TWO TABLETS BY MOUTH THREE TIMES DAILY           Rivaroxaban 20 MG Tabs   Take 1 tablet (20 mg total) by mouth daily with food.    Commonly

## (undated) NOTE — MR AVS SNAPSHOT
[facility] [place] [facility] [place] [address] [address] [address] [phone]               Thank you for choosing us for your health care visit with Miryam Diggs DO.   We are glad to serve you and happy to provide you with this summary of taking this medication, and follow the directions you see here. Commonly known as:  LASIX           propylthiouracil 50 MG Tabs   TAKE TWO TABLETS BY MOUTH THREE TIMES DAILY   What changed:  Another medication with the same name was removed.  Continue laura [place], [place]    If you are confident that your benefit plan will not require a referral or authorization, such as [place], please feel free to schedule your appointment immediately.  However, if you are unsure about the requirements

## (undated) NOTE — LETTER
9/17/2019    Patient: Megan Jones   MR Number: [id number]   YOB: 1940   Date of Visit: 9/17/2019   Physician: Patsie Nageotte, MD     Dear Medicare Patient:  Narcisa Healy TO BENEFICIARY:  Please know that while a refraction is impo

## (undated) NOTE — LETTER
AUTHORIZATION FOR SURGICAL OPERATION OR OTHER PROCEDURE    1. I hereby authorize Jose Angel Hoover  and [facility] staff assigned to my case to perform the following operation and/or procedure at the [facility]:    Cortisone injection in Right knee   _______________________________________________________________________________________________      _______________________________________________________________________________________________    2.  My physician has explained the nature and purpose of the operation or other procedure, possible alternative methods of treatment, the risks involved, and the possibility of complication to me.  I acknowledge that no guarantee has been made as to the result that may be obtained.  3.  I recognize that, during the course of this operation, or other procedure, unforseen conditions may necessitate additional or different procedure than those listed above.  I, therefore, further authorize and request that the above named physician, his/her physician assistants or designees perform such procedures as are, in his/her professional opinion, necessary and desirable.  4.  Any tissue or organs removed in the operation or other procedure may be disposed of by and at the discretion of the [facility] and [facility].  5.  I understand that in the event of a medical emergency, I will be transported by local paramedics to [facility] or other hospital emergency department.  6.  I certify that I have read and fully understand the above consent to operation and/or other procedure.    7.  I acknowledge that my physician has explained sedation/analgesia administration to me including the risks and benefits.  I consent to the administration of sedation/analgesia as may be necessary or desirable in the judgement of my physician.    Witness signature: ___________________________________________________ Date:  ______/______/_____                     Time:  ________ A.M.  P.M.       Patient Name:  ______________________________________________________  (please print)      Patient signature:  ___________________________________________________             Relationship to Patient:           []  Parent    Responsible person                          []  Spouse  In case of minor or                    [] Other  _____________   Incompetent name:  __________________________________________________                               (please print)      _____________      Responsible person  In case of minor or  Incompetent signature:  _______________________________________________    Statement of Physician  My signature below affirms that prior to the time of the procedure, I have explained to the patient and/or his/her guardian, the risks and benefits involved in the proposed treatment and any reasonable alternative to the proposed treatment.  I have also explained the risks and benefits involved in the refusal of the proposed treatment and have answered the patient's questions.                        Date:  ______/______/_______  Provider                      Signature:  __________________________________________________________       Time:  ___________ A.M    P.M.